# Patient Record
Sex: FEMALE | Race: AMERICAN INDIAN OR ALASKA NATIVE
[De-identification: names, ages, dates, MRNs, and addresses within clinical notes are randomized per-mention and may not be internally consistent; named-entity substitution may affect disease eponyms.]

---

## 2018-10-04 ENCOUNTER — HOSPITAL ENCOUNTER (EMERGENCY)
Dept: HOSPITAL 5 - ED | Age: 68
Discharge: HOME | End: 2018-10-04
Payer: MEDICARE

## 2018-10-04 VITALS — SYSTOLIC BLOOD PRESSURE: 185 MMHG | DIASTOLIC BLOOD PRESSURE: 97 MMHG

## 2018-10-04 DIAGNOSIS — I50.9: ICD-10-CM

## 2018-10-04 DIAGNOSIS — N18.6: ICD-10-CM

## 2018-10-04 DIAGNOSIS — I13.2: ICD-10-CM

## 2018-10-04 DIAGNOSIS — M19.90: ICD-10-CM

## 2018-10-04 DIAGNOSIS — Z91.013: ICD-10-CM

## 2018-10-04 DIAGNOSIS — E11.22: ICD-10-CM

## 2018-10-04 DIAGNOSIS — Z88.8: ICD-10-CM

## 2018-10-04 DIAGNOSIS — M10.072: Primary | ICD-10-CM

## 2018-10-04 DIAGNOSIS — Z79.4: ICD-10-CM

## 2018-10-04 PROCEDURE — 99282 EMERGENCY DEPT VISIT SF MDM: CPT

## 2018-10-04 NOTE — EMERGENCY DEPARTMENT REPORT
ED Extremity Problem HPI





- General


Chief complaint: Extremity Injury, Lower


Stated complaint: LEFT FOOT PAIN


Source: patient


Mode of arrival: Wheelchair


Limitations: No Limitations





- History of Present Illness


Initial comments: 





Patient is a 68-year-old  female who has a past history end-

stage renal disease and hypertension who is presenting with left foot pain.  

Patient states the pain is 8 out of 10 in severity as aching and throbbing.  

Patient was diagnosed with gout last week and was started on allopurinol but 

was told not to take it until the flareup improved which it is not.  Patient 

denies any fevers chills nausea vomiting diarrhea at this time.


Associated Symptoms: denies: shortness of breath, fever, myalgias





- Related Data


 Home Medications











 Medication  Instructions  Recorded  Confirmed  Last Taken


 


Carvedilol [Coreg] 6.25 mg PO BID 08/21/15 05/19/17 1 Day Ago





    ~05/18/17


 


NIFEdipine XL [Procardia Xl] 60 mg PO Q12HR 08/21/15 05/19/17 1 Day Ago





    ~05/18/17


 


Insulin Detemir [Levemir Flextouch] 15 unit SQ QHS PRN 12/09/16 05/19/17 1 Day 

Ago





    ~05/18/17


 


Insulin Lispro [HumaLOG VIAL] 0 units SQ AC PRN 12/09/16 05/19/17 1 Day Ago





    ~05/18/17


 


cloNIDine [Catapres] 0.2 mg PO QHS 12/09/16 05/19/17 1 Day Ago





    ~05/18/17








 Previous Rx's











 Medication  Instructions  Recorded  Last Taken  Type


 


Ferrous Sulfate [Feosol 325 MG tab] 325 mg PO BID #60 tablet 08/24/15 1 Day Ago 

Rx





   ~05/18/17 


 


Ondansetron [Zofran TAB] 4 mg PO Q8HR PRN #20 tablet 12/13/16 1 Day Ago Rx





   ~05/18/17 


 


oxyCODONE /ACETAMINOPHEN [Percocet 1 tab PO Q4HR PRN #20 tab 12/13/16 1 Day Ago 

Rx





5/325 mg]   ~05/18/17 


 


diphenhydrAMINE [Benadryl CAP] 25 mg PO Q8H PRN #10 capsule 05/19/17 Unknown Rx


 


Ibuprofen [Motrin] 600 mg PO Q8H PRN #20 tablet 10/04/18 Unknown Rx


 


oxyCODONE /ACETAMINOPHEN [Percocet 1 tab PO Q6HR PRN #15 tablet 10/04/18 

Unknown Rx





5/325]    











 Allergies











Allergy/AdvReac Type Severity Reaction Status Date / Time


 


digoxin Allergy  Hives Verified 02/17/16 11:21


 


lisinopril Allergy  Swelling Verified 02/17/16 11:21


 


nitroglycerin AdvReac  Rash, Verified 02/17/16 11:21





   HEADACHES  


 


shellfish derived AdvReac  Swelling Verified 02/17/16 11:21














ED Review of Systems


ROS: 


Stated complaint: LEFT FOOT PAIN


Other details as noted in HPI





Comment: All other systems reviewed and negative





ED Past Medical Hx





- Past Medical History


Previous Medical History?: Yes


Hx Hypertension: Yes (FOR 10+ YRS, DR. ABAD- PCP)


Hx Congestive Heart Failure: Yes (IN 2007)


Hx Diabetes: Yes (FOR 1 YR)


Hx Liver Disease: No


Hx Renal Disease: Yes (CKD STAGE 5, DR. GUTIERREZ- NEPHROLOGIST)


Hx Sickle Cell Disease: No


Hx Arthritis: Yes (Gout)


Hx Seizures: No


Hx Asthma: Yes (AS A CHILD)


Hx HIV: No


Additional medical history: Dilated cardiomyopathy / PERITONEAL DYALISIS





- Surgical History


Past Surgical History?: Yes


Hx Pacemaker: No


Hx Appendectomy: Yes (IN 1985)


Additional Surgical History: tonsil removed





- Social History


Smoking Status: Never Smoker


Substance Use Type: None





- Medications


Home Medications: 


 Home Medications











 Medication  Instructions  Recorded  Confirmed  Last Taken  Type


 


Carvedilol [Coreg] 6.25 mg PO BID 08/21/15 05/19/17 1 Day Ago History





    ~05/18/17 


 


NIFEdipine XL [Procardia Xl] 60 mg PO Q12HR 08/21/15 05/19/17 1 Day Ago History





    ~05/18/17 


 


Ferrous Sulfate [Feosol 325 MG tab] 325 mg PO BID #60 tablet 08/24/15 05/19/17 

1 Day Ago Rx





    ~05/18/17 


 


Insulin Detemir [Levemir Flextouch] 15 unit SQ QHS PRN 12/09/16 05/19/17 1 Day 

Ago History





    ~05/18/17 


 


Insulin Lispro [HumaLOG VIAL] 0 units SQ AC PRN 12/09/16 05/19/17 1 Day Ago 

History





    ~05/18/17 


 


cloNIDine [Catapres] 0.2 mg PO QHS 12/09/16 05/19/17 1 Day Ago History





    ~05/18/17 


 


Ondansetron [Zofran TAB] 4 mg PO Q8HR PRN #20 tablet 12/13/16 05/19/17 1 Day 

Ago Rx





    ~05/18/17 


 


oxyCODONE /ACETAMINOPHEN [Percocet 1 tab PO Q4HR PRN #20 tab 12/13/16 05/19/17 

1 Day Ago Rx





5/325 mg]    ~05/18/17 


 


diphenhydrAMINE [Benadryl CAP] 25 mg PO Q8H PRN #10 capsule 05/19/17  Unknown Rx


 


Ibuprofen [Motrin] 600 mg PO Q8H PRN #20 tablet 10/04/18  Unknown Rx


 


oxyCODONE /ACETAMINOPHEN [Percocet 1 tab PO Q6HR PRN #15 tablet 10/04/18  

Unknown Rx





5/325]     














ED Physical Exam





- General


Limitations: No Limitations


General appearance: alert, in no apparent distress





- Head


Head exam: Present: atraumatic, normocephalic





- Eye


Eye exam: Present: normal appearance





- ENT


ENT exam: Present: mucous membranes moist





- Neck


Neck exam: Present: normal inspection





- Respiratory


Respiratory exam: Present: normal lung sounds bilaterally.  Absent: respiratory 

distress, wheezes, rales, rhonchi





- Cardiovascular


Cardiovascular Exam: Present: regular rate, normal rhythm.  Absent: systolic 

murmur, diastolic murmur, rubs, gallop





- GI/Abdominal


GI/Abdominal exam: Present: soft, normal bowel sounds





- Extremities Exam


Extremities exam: Present: normal inspection, tenderness (patient has 

tenderness mild erythema and mild warmth to the dorsal left foot extending to 

the ankle)





- Back Exam


Back exam: Present: normal inspection





- Neurological Exam


Neurological exam: Present: alert, oriented X3





- Psychiatric


Psychiatric exam: Present: normal affect, normal mood





- Skin


Skin exam: Present: warm, dry, intact, normal color.  Absent: rash





ED Course





 Vital Signs











  10/04/18 10/04/18





  14:17 14:23


 


Temperature 98.4 F 


 


Pulse Rate 103 H 


 


Respiratory 18 





Rate  


 


Blood Pressure 190/92 


 


Blood Pressure  185/97





[Right]  


 


O2 Sat by Pulse 98 





Oximetry  














ED Medical Decision Making





- Medical Decision Making





She was started on colchicine and given meds for symptomatically relief and the 

patient will be discharged home.


Critical care attestation.: 


If time is entered above; I have spent that time in minutes in the direct care 

of this critically ill patient, excluding procedure time.








ED Disposition


Clinical Impression: 


 Gout attack





Disposition: DC-01 TO HOME OR SELFCARE


Is pt being admited?: No


Does the pt Need Aspirin: No


Condition: Stable


Instructions:  Acute Gouty Arthritis (ED)


Referrals: 


PRIMARY CARE,MD [Primary Care Provider] - 3-5 Days


Time of Disposition: 15:21

## 2020-10-25 ENCOUNTER — HOSPITAL ENCOUNTER (INPATIENT)
Dept: HOSPITAL 5 - ED | Age: 70
LOS: 2 days | Discharge: HOME | DRG: 291 | End: 2020-10-27
Attending: INTERNAL MEDICINE | Admitting: INTERNAL MEDICINE
Payer: MEDICARE

## 2020-10-25 DIAGNOSIS — Z79.4: ICD-10-CM

## 2020-10-25 DIAGNOSIS — N18.6: ICD-10-CM

## 2020-10-25 DIAGNOSIS — B20: ICD-10-CM

## 2020-10-25 DIAGNOSIS — D63.1: ICD-10-CM

## 2020-10-25 DIAGNOSIS — Z99.2: ICD-10-CM

## 2020-10-25 DIAGNOSIS — E11.22: ICD-10-CM

## 2020-10-25 DIAGNOSIS — I50.9: ICD-10-CM

## 2020-10-25 DIAGNOSIS — I13.2: Primary | ICD-10-CM

## 2020-10-25 DIAGNOSIS — Z82.49: ICD-10-CM

## 2020-10-25 DIAGNOSIS — I42.0: ICD-10-CM

## 2020-10-25 DIAGNOSIS — M19.90: ICD-10-CM

## 2020-10-25 DIAGNOSIS — Z91.013: ICD-10-CM

## 2020-10-25 DIAGNOSIS — Z88.8: ICD-10-CM

## 2020-10-25 DIAGNOSIS — E87.5: ICD-10-CM

## 2020-10-25 DIAGNOSIS — E21.3: ICD-10-CM

## 2020-10-25 DIAGNOSIS — M10.9: ICD-10-CM

## 2020-10-25 DIAGNOSIS — R44.2: ICD-10-CM

## 2020-10-25 LAB
ALBUMIN SERPL-MCNC: 3.8 G/DL (ref 3.9–5)
ALT SERPL-CCNC: 6 UNITS/L (ref 7–56)
APTT BLD: 26.4 SEC. (ref 24.2–36.6)
BACTERIA #/AREA URNS HPF: (no result) /HPF
BASOPHILS # (AUTO): 0.2 K/MM3 (ref 0–0.1)
BASOPHILS NFR BLD AUTO: 1.9 % (ref 0–1.8)
BILIRUB UR QL STRIP: (no result)
BLOOD UR QL VISUAL: (no result)
BUN SERPL-MCNC: 64 MG/DL (ref 7–17)
BUN/CREAT SERPL: 5 %
CALCIUM SERPL-MCNC: 10.3 MG/DL (ref 8.4–10.2)
EOSINOPHIL # BLD AUTO: 0.8 K/MM3 (ref 0–0.4)
EOSINOPHIL NFR BLD AUTO: 9.2 % (ref 0–4.3)
HCT VFR BLD CALC: 19.5 % (ref 30.3–42.9)
HDLC SERPL-MCNC: 33 MG/DL (ref 40–59)
HEMOLYSIS INDEX: 0
HGB BLD-MCNC: 6.2 GM/DL (ref 10.1–14.3)
INR PPP: 0.76 (ref 0.87–1.13)
LYMPHOCYTES # BLD AUTO: 1.1 K/MM3 (ref 1.2–5.4)
LYMPHOCYTES NFR BLD AUTO: 11.9 % (ref 13.4–35)
MCHC RBC AUTO-ENTMCNC: 32 % (ref 30–34)
MCV RBC AUTO: 96 FL (ref 79–97)
MONOCYTES # (AUTO): 0.8 K/MM3 (ref 0–0.8)
MONOCYTES % (AUTO): 8.8 % (ref 0–7.3)
MUCOUS THREADS #/AREA URNS HPF: (no result) /HPF
PH UR STRIP: 7 [PH] (ref 5–7)
PLATELET # BLD: 322 K/MM3 (ref 140–440)
RBC # BLD AUTO: 2.03 M/MM3 (ref 3.65–5.03)
RBC #/AREA URNS HPF: 1 /HPF (ref 0–6)
UROBILINOGEN UR-MCNC: < 2 MG/DL (ref ?–2)
WBC #/AREA URNS HPF: 2 /HPF (ref 0–6)

## 2020-10-25 PROCEDURE — 86920 COMPATIBILITY TEST SPIN: CPT

## 2020-10-25 PROCEDURE — 90471 IMMUNIZATION ADMIN: CPT

## 2020-10-25 PROCEDURE — 85730 THROMBOPLASTIN TIME PARTIAL: CPT

## 2020-10-25 PROCEDURE — 81001 URINALYSIS AUTO W/SCOPE: CPT

## 2020-10-25 PROCEDURE — 85610 PROTHROMBIN TIME: CPT

## 2020-10-25 PROCEDURE — 86901 BLOOD TYPING SEROLOGIC RH(D): CPT

## 2020-10-25 PROCEDURE — 86850 RBC ANTIBODY SCREEN: CPT

## 2020-10-25 PROCEDURE — 80061 LIPID PANEL: CPT

## 2020-10-25 PROCEDURE — 93005 ELECTROCARDIOGRAM TRACING: CPT

## 2020-10-25 PROCEDURE — 84484 ASSAY OF TROPONIN QUANT: CPT

## 2020-10-25 PROCEDURE — 86900 BLOOD TYPING SEROLOGIC ABO: CPT

## 2020-10-25 PROCEDURE — 82553 CREATINE MB FRACTION: CPT

## 2020-10-25 PROCEDURE — 84100 ASSAY OF PHOSPHORUS: CPT

## 2020-10-25 PROCEDURE — 85025 COMPLETE CBC W/AUTO DIFF WBC: CPT

## 2020-10-25 PROCEDURE — 83735 ASSAY OF MAGNESIUM: CPT

## 2020-10-25 PROCEDURE — 71046 X-RAY EXAM CHEST 2 VIEWS: CPT

## 2020-10-25 PROCEDURE — P9016 RBC LEUKOCYTES REDUCED: HCPCS

## 2020-10-25 PROCEDURE — 82550 ASSAY OF CK (CPK): CPT

## 2020-10-25 PROCEDURE — 36415 COLL VENOUS BLD VENIPUNCTURE: CPT

## 2020-10-25 PROCEDURE — 80048 BASIC METABOLIC PNL TOTAL CA: CPT

## 2020-10-25 PROCEDURE — 83880 ASSAY OF NATRIURETIC PEPTIDE: CPT

## 2020-10-25 PROCEDURE — 82962 GLUCOSE BLOOD TEST: CPT

## 2020-10-25 PROCEDURE — 30233N1 TRANSFUSION OF NONAUTOLOGOUS RED BLOOD CELLS INTO PERIPHERAL VEIN, PERCUTANEOUS APPROACH: ICD-10-PCS | Performed by: INTERNAL MEDICINE

## 2020-10-25 PROCEDURE — 80053 COMPREHEN METABOLIC PANEL: CPT

## 2020-10-25 RX ADMIN — INSULIN LISPRO SCH: 100 INJECTION, SOLUTION INTRAVENOUS; SUBCUTANEOUS at 14:37

## 2020-10-25 RX ADMIN — Medication SCH ML: at 21:58

## 2020-10-25 RX ADMIN — NIFEDIPINE SCH MG: 60 TABLET, EXTENDED RELEASE ORAL at 21:58

## 2020-10-25 RX ADMIN — INSULIN LISPRO SCH: 100 INJECTION, SOLUTION INTRAVENOUS; SUBCUTANEOUS at 23:23

## 2020-10-25 NOTE — EMERGENCY DEPARTMENT REPORT
ED Shortness of Breath HPI





- General


Chief Complaint: Dyspnea/Respdistress


Stated Complaint: CESAR/PAIN/SKIN PROBLEMS


Time Seen by Provider: 10/25/20 11:00


Source: patient


Mode of arrival: Ambulatory


Limitations: No Limitations





- History of Present Illness


Initial Comments: 


This is a very pleasant 70-year-old female on PD.  She states that she did not 

finish a complete treatment yesterday because she was hurting all over.  She 

does not complain of abdominal pain now.  She denies fever or chills.  She 

states she has been short of breath for some time.





Patient does appear to have a number of somatic delusions to include "blood 

coming out of my fingertips".  "Foam coming out of my hands" and "pus coming out

everywhere I have hair".  Patient is however oriented, awake and alert.  She is 

not in respiratory distress.  She is not otherwise agitated or apparently 

delusional.  She tells me she lives at home with her .  She denies being 

on any medications which might be associated with any of the above symptoms.  

She states that she has had the symptoms for some time.  She states she is in 

regular follow-up with [facility] nephrology.  She tells me that she has 

not been in the hospital since her last admission here when she had pneum

operitoneum related to air getting in via her PD catheter.  Patient states she 

has never been a smoker nor does her .





Per last nephrology note summary:





Mrs. Bahena is a 65yo with ESRD on CCPD who presented to the ED with 

shoulder/neck pain followed by facial/neck swelling.  She denies SOB, fever, 

sore throat, difficulty swallowing.  She denies change in food/eating habits.  S

ymptoms continued to worsen which prompted her visit to the ED.  Pain and 

swelling have both resolved according to patient.





Past History


Past Medical History: COPD, hypertension, renal failure, other (Dilated 

Cardiomyopathy).  denies: diabetes


Past Surgical History: appendectomy, Other (Peritoneal Dialysis Catherer)


Social history: denies: smoking, alcohol abuse


Family history: cancer (Father  of Lung CA), hypertension (Mother has HTN)





Medications and Allergies


Allergies





Allergy/AdvReac   Type   Severity   Reaction   Status   Date / Time


digoxin   Allergy      Hives   Verified   16 11:21


lisinopril   Allergy      Swelling   Verified   16 11:21


nitroglycerin   AdvReac      Rash,   Verified   16 11:21


         HEADACHES      


shellfish derived   AdvReac      Swelling   Verified   16 11:21








Home Medications





 Medication    Instructions    Recorded    Confirmed    Last Taken    Type


Carvedilol [Coreg]   6.25 mg PO BID   08/21/15   05/19/17   1 Day Ago   History


NIFEdipine XL [Procardia Xl]   60 mg PO Q12HR   08/21/15   05/19/17   1 Day Ago

   History


Ferrous Sulfate [Feosol 325 MG tab]   325 mg PO BID #60 tablet   08/24/15   

05/19/17   1 Day Ago   Rx


Insulin Detemir [Levemir Flextouch]   15 unit SQ QHS PRN   16

   1 Day Ago   History


Insulin Lispro [HumaLOG VIAL]   0 units SQ AC PRN   16   1 Day 

Ago   History


cloNIDine [Catapres]   0.2 mg PO QHS   16   1 Day Ago   History


Ondansetron [Zofran TAB]   4 mg PO Q8HR PRN #20 tablet   16   1

Day Ago   Rx


oxyCODONE /ACETAMINOPHEN [Percocet   1 tab PO Q4HR PRN #20 tab   16   1 Day Ago   Rx


5/325 mg]               


diphenhydrAMINE [Benadryl CAP]   25 mg PO Q8H PRN #10 capsule   17      

Unknown   Rx








Active Meds: 


Active Medications





Carvedilol (Coreg)  6.25 mg PO BID [facility]


   Last Admin: 17 11:21 Dose:  6.25 mg


Clonidine HCl (Catapres)  0.2 mg PO QHS [facility]


Diphenhydramine HCl (Benadryl)  25 mg PO Q8H PRN


   PRN Reason: Itching


   Last Admin: 17 11:22 Dose:  25 mg


Ferrous Sulfate (Feosol)  325 mg PO BID [facility]


   Last Admin: 17 11:21 Dose:  325 mg


Heparin Sodium (Porcine) (Heparin)  5,000 unit SUB-Q Q8HR [facility]


   Last Admin: 17 05:45 Dose:  5,000 unit


Insulin Aspart (Novolog)  5 units SUB-Q AC [facility]


   Last Admin: 17 11:29 Dose:  Not Given


Insulin Detemir (Levemir)  15 units SUB-Q QHS [facility]


Insulin Human Regular (Novolin R)  0 units SUB-Q ACHS ANTONY


   PRN Reason: Protocol


Morphine Sulfate (Morphine)  1 mg IV Q4H PRN


   PRN Reason: Pain , Severe (7-10)


   Last Admin: 17 11:24 Dose:  1 mg


Nifedipine (Procardia Xl)  60 mg PO Q12HR ANTONY


Ondansetron HCl (Zofran)  4 mg PO Q8HR PRN


   PRN Reason: Nausea


Oxycodone/Acetaminophen (Percocet 5/325)  1 tab PO Q4HR PRN


   PRN Reason: Pain


   Last Admin: 17 12:42 Dose:  1 tab


Peritoneal Dialysis Solution (Dianeal Low Calcium W/2.5% Dextrose)  2,000 ml IP 

Q4H [facility]


   Last Admin: 17 12:26 Dose:  2,000 ml





MD Complaint: shortness of breath


-: Gradual, days(s)


Known History Of: COPD, other


Associated Symptoms: denies other symptoms


Treatments Prior to Arrival: none





- Related Data


Home Oxygen Therapy: No


                                Home Medications











 Medication  Instructions  Recorded  Confirmed  Last Taken


 


NIFEdipine XL [Procardia Xl] 60 mg PO Q12HR 08/21/15 05/19/17 10/24/20


 


carvediloL [Coreg] 6.25 mg PO BID 08/21/15 05/19/17 10/24/20


 


Insulin Detemir (Nf) [Levemir 15 unit SQ QHS PRN 12/09/16 05/19/17 10/25/19





Flextouch (Nf)]    


 


Insulin Lispro [HumaLOG VIAL] 0 units SQ AC PRN 12/09/16 05/19/17 10/25/19


 


cloNIDine [Catapres] 0.2 mg PO QHS 12/09/16 05/19/17 10/24/20


 


Cholecalciferol (Vitamin D3) 3,000 unit PO DAILY 10/25/20 10/25/20 10/24/20





[Vitamin D3 3,000 unit]    


 


Cinacalcet HCl [Sensipar] 60 mg PO DAILY 10/25/20 10/25/20 Unknown


 


Gentamicin 0.1% Top Oint(Nf) 1 applic TP TID 10/25/20 10/25/20 Unknown





[Gentamicin 0.1% Top Oint (Nf)]    








                                  Previous Rx's











 Medication  Instructions  Recorded  Last Taken  Type


 


Ibuprofen [Motrin] 600 mg PO Q8H PRN #20 tablet 10/04/18 Unknown Rx











                                    Allergies











Allergy/AdvReac Type Severity Reaction Status Date / Time


 


digoxin Allergy  Hives Verified 16 11:21


 


lisinopril Allergy  Swelling Verified 16 11:21


 


nitroglycerin AdvReac  Rash, Verified 16 11:21





   HEADACHES  


 


shellfish derived AdvReac  Swelling Verified 16 11:21














ED Review of Systems


ROS: 


Stated complaint: CESAR/PAIN/SKIN PROBLEMS


Other details as noted in HPI





Constitutional: denies: chills, fever


Eyes: denies: eye pain, eye discharge, vision change


ENT: denies: ear pain, throat pain


Respiratory: shortness of breath, SOB with exertion.  denies: cough (Only 

occasional with no acute change), wheezing


Cardiovascular: denies: chest pain, palpitations


Endocrine: no symptoms reported


Gastrointestinal: denies: abdominal pain, nausea, diarrhea


Genitourinary: denies: urgency, dysuria, discharge


Musculoskeletal: denies: back pain, joint swelling, arthralgia


Skin: as per HPI (Quite a variety of symptoms likely somatic in nature.)


Neurological: denies: headache, weakness, paresthesias


Psychiatric: denies: anxiety, depression


Hematological/Lymphatic: denies: easy bleeding, easy bruising





ED Past Medical Hx





- Past Medical History


Previous Medical History?: Yes


Hx Hypertension: Yes (FOR 10+ YRS, DR. ABAD- PCP)


Hx Congestive Heart Failure: Yes ([place] )


Hx Diabetes: Yes


Hx Liver Disease: No


Hx Renal Disease: Yes (CKD STAGE 5, DR. GUTIERREZ- NEPHROLOGIST)


Hx Sickle Cell Disease: No


Hx Arthritis: Yes (Gout)


Hx Seizures: No


Hx Asthma: Yes (AS A CHILD)


Hx HIV: No


Additional medical history: Dilated cardiomyopathy / PERITONEAL DYALISIS





- Surgical History


Past Surgical History?: Yes


Hx Pacemaker: No


Hx Appendectomy: Yes ([place] )


Additional Surgical History: tonsil removed





- Social History


Smoking Status: Never Smoker


Substance Use Type: None





- Medications


Home Medications: 


                                Home Medications











 Medication  Instructions  Recorded  Confirmed  Last Taken  Type


 


NIFEdipine XL [Procardia Xl] 60 mg PO Q12HR 08/21/15 05/19/17 10/24/20 History


 


carvediloL [Coreg] 6.25 mg PO BID 08/21/15 05/19/17 10/24/20 History


 


Insulin Detemir (Nf) [Levemir 15 unit SQ QHS PRN 12/09/16 05/19/17 10/25/19 

History





Flextouch (Nf)]     


 


Insulin Lispro [HumaLOG VIAL] 0 units SQ AC PRN 12/09/16 05/19/17 10/25/19 

History


 


cloNIDine [Catapres] 0.2 mg PO QHS 12/09/16 05/19/17 10/24/20 History


 


Ibuprofen [Motrin] 600 mg PO Q8H PRN #20 tablet 10/04/18  Unknown Rx


 


Cholecalciferol (Vitamin D3) 3,000 unit PO DAILY 10/25/20 10/25/20 10/24/20 

History





[Vitamin D3 3,000 unit]     


 


Cinacalcet HCl [Sensipar] 60 mg PO DAILY 10/25/20 10/25/20 Unknown History


 


Gentamicin 0.1% Top Oint(Nf) 1 applic TP TID 10/25/20 10/25/20 Unknown History





[Gentamicin 0.1% Top Oint (Nf)]     














ED Physical Exam





- General


Limitations: Physical Limitation


General appearance: alert, in no apparent distress, other (Pale)





- Head


Head exam: Present: atraumatic, normocephalic





- Eye


Eye exam: Present: normal appearance.  Absent: scleral icterus





- ENT


ENT exam: Present: mucous membranes moist





- Neck


Neck exam: Present: normal inspection.  Absent: tenderness, meningismus





- Respiratory


Respiratory exam: Present: normal lung sounds bilaterally.  Absent: respiratory 

distress





- Cardiovascular


Cardiovascular Exam: Present: regular rate, normal rhythm.  Absent: systolic 

murmur, diastolic murmur, rubs, gallop





- GI/Abdominal


GI/Abdominal exam: Present: soft, normal bowel sounds, other (PD catheter 

dressed in situ.  No obvious abnormal findings).  Absent: distended, tenderness,

guarding, rebound





- Extremities Exam


Extremities exam: Present: pedal edema (Mild pedal edema)





- Back Exam


Back exam: Present: normal inspection





- Neurological Exam


Neurological exam: Present: alert, oriented X3, CN II-XII intact.  Absent: motor

sensory deficit





- Psychiatric


Psychiatric exam: Present: normal affect, normal mood





- Skin


Skin exam: Present: warm, dry, intact, other (Patient skin is pale.  She does 

have a number of pigmented areas to include the bottom of her feet.  They appear

to be quite chronic in nature.).  Absent: rash





ED Course


                                   Vital Signs











  10/25/20 10/25/20 10/25/20





  10:03 11:05 11:15


 


Temperature 97.7 F  


 


Pulse Rate 93 H 87 96 H


 


Respiratory  13 16





Rate   


 


Blood Pressure 161/74  143/66


 


O2 Sat by Pulse 16 L 96 99





Oximetry   














  10/25/20 10/25/20





  11:31 12:01


 


Temperature  


 


Pulse Rate 85 


 


Respiratory 20 20





Rate  


 


Blood Pressure 147/71 


 


O2 Sat by Pulse 98 98





Oximetry  














- Reevaluation(s)


Reevaluation #1: 


Patient will require transfusion and admission to the hospital.  Her chemistries

are yet pending.


10/25/20 11:20





Reevaluation #2: 


Discussed with hospitalist.  Will be admitted by Dr. Floyd.  Consult to 

nephrology.  No acute renal issue found this time that requires emergency 

consultation.  Unit of blood ordered.  Hospitalist for further evaluation.


10/25/20 11:28








ED Medical Decision Making





- Lab Data


Result diagrams: 


                                 10/25/20 10:30





                                 10/25/20 10:30








                         Laboratory Results - last 24 hr











  10/25/20 10/25/20 10/25/20





  10:21 10:30 10:30


 


WBC   9.1 


 


RBC   2.03 L 


 


Hgb   6.2 L 


 


Hct   19.5 L* 


 


MCV   96 


 


MCH   31 


 


MCHC   32 


 


RDW   19.9 H 


 


Plt Count   322 


 


Lymph % (Auto)   11.9 L 


 


Mono % (Auto)   8.8 H 


 


Eos % (Auto)   9.2 H 


 


Baso % (Auto)   1.9 H 


 


Lymph # (Auto)   1.1 L 


 


Mono # (Auto)   0.8 


 


Eos # (Auto)   0.8 H 


 


Baso # (Auto)   0.2 H 


 


Seg Neutrophils %   68.2 


 


Seg Neutrophils #   6.2 


 


Sodium    142


 


Potassium    5.0


 


Chloride    97.7 L


 


Carbon Dioxide    25


 


Anion Gap    24


 


BUN    64 H


 


Creatinine    13.8 H


 


Estimated GFR    3


 


BUN/Creatinine Ratio    5


 


Glucose    98


 


POC Glucose  99  


 


Calcium    10.3 H


 


Phosphorus   


 


Magnesium   


 


Total Bilirubin    0.30


 


AST    9


 


ALT    6 L


 


Alkaline Phosphatase    138 H


 


Total Protein    7.6


 


Albumin    3.8 L


 


Albumin/Globulin Ratio    1.0


 


Urine Color   


 


Urine Turbidity   


 


Urine pH   


 


Ur Specific Gravity   


 


Urine Protein   


 


Urine Glucose (UA)   


 


Urine Ketones   


 


Urine Blood   


 


Urine Nitrite   


 


Urine Bilirubin   


 


Urine Urobilinogen   


 


Ur Leukocyte Esterase   


 


Urine WBC (Auto)   


 


Urine RBC (Auto)   


 


U Epithel Cells (Auto)   


 


Urine Bacteria (Auto)   


 


Urine Mucus   














  10/25/20 10/25/20





  10:30 Unknown


 


WBC  


 


RBC  


 


Hgb  


 


Hct  


 


MCV  


 


MCH  


 


MCHC  


 


RDW  


 


Plt Count  


 


Lymph % (Auto)  


 


Mono % (Auto)  


 


Eos % (Auto)  


 


Baso % (Auto)  


 


Lymph # (Auto)  


 


Mono # (Auto)  


 


Eos # (Auto)  


 


Baso # (Auto)  


 


Seg Neutrophils %  


 


Seg Neutrophils #  


 


Sodium  


 


Potassium  


 


Chloride  


 


Carbon Dioxide  


 


Anion Gap  


 


BUN  


 


Creatinine  


 


Estimated GFR  


 


BUN/Creatinine Ratio  


 


Glucose  


 


POC Glucose  


 


Calcium  


 


Phosphorus  7.60 H 


 


Magnesium  2.00 


 


Total Bilirubin  


 


AST  


 


ALT  


 


Alkaline Phosphatase  


 


Total Protein  


 


Albumin  


 


Albumin/Globulin Ratio  


 


Urine Color   Yellow


 


Urine Turbidity   Clear


 


Urine pH   7.0


 


Ur Specific Gravity   1.012


 


Urine Protein   100 mg/dl


 


Urine Glucose (UA)   Neg


 


Urine Ketones   Neg


 


Urine Blood   Neg


 


Urine Nitrite   Neg


 


Urine Bilirubin   Neg


 


Urine Urobilinogen   < 2.0


 


Ur Leukocyte Esterase   Tr


 


Urine WBC (Auto)   2.0


 


Urine RBC (Auto)   1.0


 


U Epithel Cells (Auto)   5.0


 


Urine Bacteria (Auto)   1+


 


Urine Mucus   Few














- EKG Data


-: EKG Interpreted by Me


EKG shows normal: sinus rhythm, axis, intervals, QRS complexes, ST-T waves


Rate: normal





- EKG Data


Interpretation: no acute changes, nonspecific ST-T wave rustam, other (P pulmonale 

noted)





- Radiology Data


Radiology results: image reviewed (Consistent with chronic lung, no acute 

finding)


Critical care attestation.: 


If time is entered above; I have spent that time in minutes in the direct care 

of this critically ill patient, excluding procedure time.








ED Disposition


Clinical Impression: 


 Symptomatic anemia, End-stage renal disease on peritoneal dialysis, 

Hypophosphatemia, Hallucinosis, Hyperphosphatemia, Dyspnea





Disposition:  OP ADMIT IP TO THIS HOSP


Is pt being admited?: Yes


Does the pt Need Aspirin: Yes


Condition: Stable


Time of Disposition: 11:29

## 2020-10-25 NOTE — HISTORY AND PHYSICAL REPORT
History of Present Illness


Date of examination: 10/25/20


Date of admission: 


10/25/20 11:56





Chief complaint: 





sob fatigue  anemia


History of present illness: 





Patient is a 70-year-old female with a history of end-stage renal disease, COPD 

presents with shortness of breath dyspnea on exertion orthopnea which she 

described as trouble breathing when she lies down.  Patient denies any chest 

pain denies nausea vomiting denies any sick contacts denies any recent travel.  

Patient states she has been doing fairly well until this time medically patient 

however will quickly return the history to some what appears to be visual 

hallucinations which she states blood and pus coming from her fingertips in her 

feet.  There are obviously no lesions there that are consistent with this.  

Patient showed me on her phone picture of stretch marks and then says this is 

pus running out of her skin when clearly that stretch marks.  Patient states she

has itching on the bottom her feet.  I did call her  and he states that 

this was unusual behavior for the patient however he did not have much to add as

far as her history.  Was very difficult to get any information from him even if 

he knew what was going on.  Apparently may be a ex-.  Otherwise patient 

is comfortable no new concerns.  She is not any pain denies pain no fever 

chills.  The rest of her history and conversation is appropriate.  Upon work-up 

patient found to be anemic with hematocrit of 15 and hemoglobin of 6.  Patient 

was admitted for anemia transfusion end-stage renal disease she sees Dr. Jovanni conti for nephrology.





Past History


Past Medical History: anemia, COPD, dialysis, ESRD, hypertension.  denies: acute

MI, atrial fib, arrhythmia, arthritis, cancer, diabetes, DVT, GERD, heart 

failure, hepatitis, HIV/AIDS, hyperthyroidism, hyperlipidemia, liver disease, 

pulmonary embolism, stroke, other


Past Surgical History: Other (PD)


Social history: , lives with family, full code.  denies: smoking, alcohol

abuse, prescription drug abuse


Family history: hypertension





Medications and Allergies


                                    Allergies











Allergy/AdvReac Type Severity Reaction Status Date / Time


 


digoxin Allergy  Hives Verified 02/17/16 11:21


 


lisinopril Allergy  Swelling Verified 02/17/16 11:21


 


nitroglycerin AdvReac  Rash, Verified 02/17/16 11:21





   HEADACHES  


 


shellfish derived AdvReac  Swelling Verified 02/17/16 11:21











                                Home Medications











 Medication  Instructions  Recorded  Confirmed  Last Taken  Type


 


NIFEdipine XL [Procardia Xl] 60 mg PO Q12HR 08/21/15 10/25/20 10/24/20 History


 


carvediloL [Coreg] 6.25 mg PO BID 08/21/15 10/25/20 10/24/20 History


 


Insulin Detemir (Nf) [Levemir 15 unit SQ QHS PRN 12/09/16 10/25/20 10/25/19 

History





Flextouch (Nf)]     


 


Insulin Lispro [HumaLOG VIAL] 0 units SQ AC PRN 12/09/16 10/25/20 10/25/19 

History


 


cloNIDine [Catapres] 0.2 mg PO QHS 12/09/16 10/25/20 10/24/20 History


 


Ibuprofen [Motrin] 600 mg PO Q8H PRN #20 tablet 10/04/18 10/25/20 Unknown Rx


 


Cholecalciferol (Vitamin D3) 3,000 unit PO DAILY 10/25/20 10/25/20 10/24/20 

History





[Vitamin D3 3,000 unit]     


 


Cinacalcet HCl [Sensipar] 60 mg PO DAILY 10/25/20 10/25/20 Unknown History


 


Gentamicin 0.1% Top Oint(Nf) 1 applic TP TID 10/25/20 10/25/20 Unknown History





[Gentamicin 0.1% Top Oint (Nf)]     











Active Meds: 


Active Medications





Acetaminophen (Tylenol)  650 mg PO Q4H PRN


   PRN Reason: Pain MILD(1-3)/Fever >100.5/HA


Acetaminophen (Tylenol)  650 mg PO Q4H PRN


   PRN Reason: Pain MILD(1-3)/Fever >100.5/HA


Carvedilol (Coreg)  6.25 mg PO BID ANTONY


Clonidine HCl (Catapres)  0.2 mg PO QHS ANTONY


Dextrose (D50w (25gm) Syringe)  50 ml IV Q30MIN PRN; Protocol


   PRN Reason: Hypoglycemia


Insulin Human Lispro (Humalog)  0 unit SUB-Q Q6H ANTONY; Protocol


Miscellaneous Medication (Cinacalcet Hcl [Sensipar])  60 mg PO DAILY ANTONY


Miscellaneous Medication (Cholecalciferol (Vitamin D3) [Vitamin D3 3,000 Unit]) 

3,000 unit PO DAILY ANTONY


Miscellaneous Medication (Insulin Detemir (Nf))  15 unit SQ QHS PRN


   PRN Reason: HIGH GLUCOSE


Nifedipine (Procardia Xl)  60 mg PO Q12HR ANTONY


Ondansetron HCl (Zofran)  4 mg IV Q8H PRN


   PRN Reason: Nausea And Vomiting


Ondansetron HCl (Zofran)  4 mg IV Q8H PRN


   PRN Reason: Nausea And Vomiting


Oxycodone/Acetaminophen (Percocet 5/325)  1 tab PO Q6H PRN


   PRN Reason: Pain, Moderate (4-6)


Sodium Chloride (Sodium Chloride Flush Syringe 10 Ml)  10 ml IV BID ANTONY


Sodium Chloride (Sodium Chloride Flush Syringe 10 Ml)  10 ml IV PRN PRN


   PRN Reason: LINE FLUSH


Sodium Chloride (Sodium Chloride Flush Syringe 10 Ml)  10 ml IV BID ANTONY


Sodium Chloride (Sodium Chloride Flush Syringe 10 Ml)  10 ml IV PRN PRN


   PRN Reason: LINE FLUSH











Review of Systems


Constitutional: weakness, malaise, no weight loss, no weight gain, no fever, no 

chills, no sweats, no night sweats, no anorexia, no lethargy, no chronic 

headaches, no poor appetite, no daytime sleepiness, no chronic pain


Ears, nose, mouth and throat: no deferred, no ear pain, no ear discharge, no 

tinnitis, no nasal congestion, no nasal discharge, no dental pain, no dysphagia,

no post-nasal drip, no headache, no vertigo, no neck lump


Cardiovascular: shortness of breath, dyspnea on exertion, paroxysmal nocturnal 

dyspnea, no chest pain, no orthopnea, no palpitations, no rapid/irregular heart 

beat, no edema, no syncope, no lightheadedness, no claudication, no phlebitis, 

no high blood pressure, no leg edema, no decreased exercise tolerance


Respiratory: shortness of breath, dyspnea on exertion, no cough, no cough with 

sputum, no excessive sputum, no hemoptysis, no congestion, no wheezing, no 

pleurisy, no pain, no pain on inspiration, no respiratory infections


Gastrointestinal: nausea, no abdominal pain, no vomiting, no diarrhea, no 

constipation, no change in bowel habits, no hematemesis, no coffee ground 

emesis, no melena, no hematochezia, no heartburn, no lactose intolerance


Musculoskeletal: no neck stiffness, no neck pain, no shooting arm pain, no arm 

numbness/tingling, no low back pain, no shooting leg pain, no leg 

numbness/tingling, no redness of joints, no hot joints, no morning stiffness, no

muscle cramps, no limitation of motion, no fractures


Neurological: no transient paralysis, no paralysis, no numbness, no syncope, no 

tremors, no vertigo, no migraines, no convulsions, no change in mentation, no 

memory loss, no motor disturbance, no double vision, no hearing difficulties, no

paralysis


Psychiatric: anxiety, memory loss, sleep disturbances, no change in sleep 

habits, no insomnia, no hypersomnia, no change in libido, no suicidal ideation, 

no paranoia, no depression, no hopelessness, no sadness/tearfullness


Endocrine: no cold intolerance, no excessive thirst, no nocturia, no weight 

change, no proptosis, no low blood sugars, no recent glucocorticoid use, no 

other


Hematologic/Lymphatic: no thrombophilia





Exam





- Constitutional


Vitals: 


                                        











Temp Pulse Resp BP Pulse Ox


 


 97.7 F   85   20   147/71   98 


 


 10/25/20 10:03  10/25/20 11:31  10/25/20 12:01  10/25/20 11:31  10/25/20 12:01











General appearance: Present: no acute distress, well-nourished





- EENT


Eyes: Present: PERRL


ENT: hearing intact, clear oral mucosa





- Neck


Neck: Present: supple, normal ROM





- Respiratory


Respiratory effort: normal


Respiratory: bilateral: CTA





- Cardiovascular


Heart Sounds: Present: S1 & S2.  Absent: rub, click





- Extremities


Extremities: pulses symmetrical, No edema


Peripheral Pulses: within normal limits





- Abdominal


General gastrointestinal: Present: soft, non-tender, non-distended, normal bowel

sounds


Female genitourinary: Present: normal





- Integumentary


Integumentary: Present: clear, warm, dry





- Musculoskeletal


Musculoskeletal: gait normal, strength equal bilaterally





- Psychiatric


Psychiatric: appropriate mood/affect, intact judgment & insight





- Neurologic


Neurologic: CNII-XII intact, moves all extremities





HEART Score





- HEART Score


Troponin: 


                                        











Troponin T  0.050 ng/mL (0.00-0.029)  H  10/25/20  11:20    














Results





- Labs


CBC & Chem 7: 


                                 10/25/20 10:30





                                 10/25/20 10:30


Labs: 


                             Laboratory Last Values











WBC  9.1 K/mm3 (4.5-11.0)   10/25/20  10:30    


 


RBC  2.03 M/mm3 (3.65-5.03)  L  10/25/20  10:30    


 


Hgb  6.2 gm/dl (10.1-14.3)  L  10/25/20  10:30    


 


Hct  19.5 % (30.3-42.9)  L*  10/25/20  10:30    


 


MCV  96 fl (79-97)   10/25/20  10:30    


 


MCH  31 pg (28-32)   10/25/20  10:30    


 


MCHC  32 % (30-34)   10/25/20  10:30    


 


RDW  19.9 % (13.2-15.2)  H  10/25/20  10:30    


 


Plt Count  322 K/mm3 (140-440)   10/25/20  10:30    


 


Lymph % (Auto)  11.9 % (13.4-35.0)  L  10/25/20  10:30    


 


Mono % (Auto)  8.8 % (0.0-7.3)  H  10/25/20  10:30    


 


Eos % (Auto)  9.2 % (0.0-4.3)  H  10/25/20  10:30    


 


Baso % (Auto)  1.9 % (0.0-1.8)  H  10/25/20  10:30    


 


Lymph # (Auto)  1.1 K/mm3 (1.2-5.4)  L  10/25/20  10:30    


 


Mono # (Auto)  0.8 K/mm3 (0.0-0.8)   10/25/20  10:30    


 


Eos # (Auto)  0.8 K/mm3 (0.0-0.4)  H  10/25/20  10:30    


 


Baso # (Auto)  0.2 K/mm3 (0.0-0.1)  H  10/25/20  10:30    


 


Seg Neutrophils %  68.2 % (40.0-70.0)   10/25/20  10:30    


 


Seg Neutrophils #  6.2 K/mm3 (1.8-7.7)   10/25/20  10:30    


 


PT  10.7 Sec. (12.2-14.9)  L  10/25/20  11:20    


 


INR  0.76  (0.87-1.13)  L  10/25/20  11:20    


 


APTT  26.4 Sec. (24.2-36.6)   10/25/20  11:20    


 


Sodium  142 mmol/L (137-145)   10/25/20  10:30    


 


Potassium  5.0 mmol/L (3.6-5.0)   10/25/20  10:30    


 


Chloride  97.7 mmol/L ()  L  10/25/20  10:30    


 


Carbon Dioxide  25 mmol/L (22-30)   10/25/20  10:30    


 


Anion Gap  24 mmol/L  10/25/20  10:30    


 


BUN  64 mg/dL (7-17)  H  10/25/20  10:30    


 


Creatinine  13.8 mg/dL (0.6-1.2)  H  10/25/20  10:30    


 


Estimated GFR  3 ml/min  10/25/20  10:30    


 


BUN/Creatinine Ratio  5 %  10/25/20  10:30    


 


Glucose  98 mg/dL ()   10/25/20  10:30    


 


POC Glucose  99 mg/dL ()   10/25/20  10:21    


 


Calcium  10.3 mg/dL (8.4-10.2)  H  10/25/20  10:30    


 


Phosphorus  7.60 mg/dL (2.5-4.5)  H  10/25/20  10:30    


 


Magnesium  2.00 mg/dL (1.7-2.3)   10/25/20  10:30    


 


Total Bilirubin  0.30 mg/dL (0.1-1.2)   10/25/20  10:30    


 


AST  9 units/L (5-40)   10/25/20  10:30    


 


ALT  6 units/L (7-56)  L  10/25/20  10:30    


 


Alkaline Phosphatase  138 units/L ()  H  10/25/20  10:30    


 


Total Creatine Kinase  65 units/L ()   10/25/20  11:20    


 


CK-MB (CK-2)  2.1 ng/mL (0.0-4.0)   10/25/20  11:20    


 


CK-MB (CK-2) Rel Index  3.2  (0-4)   10/25/20  11:20    


 


Troponin T  0.050 ng/mL (0.00-0.029)  H  10/25/20  11:20    


 


NT-Pro-B Natriuret Pep  8264 pg/mL (0-900)  H  10/25/20  11:20    


 


Total Protein  7.6 g/dL (6.3-8.2)   10/25/20  10:30    


 


Albumin  3.8 g/dL (3.9-5)  L  10/25/20  10:30    


 


Albumin/Globulin Ratio  1.0 %  10/25/20  10:30    


 


Triglycerides  134 mg/dL (2-149)   10/25/20  11:20    


 


Cholesterol  119 mg/dL ()   10/25/20  11:20    


 


LDL Cholesterol Direct  61 mg/dL ()   10/25/20  11:20    


 


HDL Cholesterol  33 mg/dL (40-59)  L  10/25/20  11:20    


 


Cholesterol/HDL Ratio  3.60 %  10/25/20  11:20    


 


Urine Color  Yellow  (Yellow)   10/25/20  Unknown


 


Urine Turbidity  Clear  (Clear)   10/25/20  Unknown


 


Urine pH  7.0  (5.0-7.0)   10/25/20  Unknown


 


Ur Specific Gravity  1.012  (1.003-1.030)   10/25/20  Unknown


 


Urine Protein  100 mg/dl mg/dL (Negative)   10/25/20  Unknown


 


Urine Glucose (UA)  Neg mg/dL (Negative)   10/25/20  Unknown


 


Urine Ketones  Neg mg/dL (Negative)   10/25/20  Unknown


 


Urine Blood  Neg  (Negative)   10/25/20  Unknown


 


Urine Nitrite  Neg  (Negative)   10/25/20  Unknown


 


Urine Bilirubin  Neg  (Negative)   10/25/20  Unknown


 


Urine Urobilinogen  < 2.0 mg/dL (<2.0)   10/25/20  Unknown


 


Ur Leukocyte Esterase  Tr  (Negative)   10/25/20  Unknown


 


Urine WBC (Auto)  2.0 /HPF (0.0-6.0)   10/25/20  Unknown


 


Urine RBC (Auto)  1.0 /HPF (0.0-6.0)   10/25/20  Unknown


 


U Epithel Cells (Auto)  5.0 /HPF (0-13.0)   10/25/20  Unknown


 


Urine Bacteria (Auto)  1+ /HPF (Negative)   10/25/20  Unknown


 


Urine Mucus  Few /HPF  10/25/20  Unknown


 


Blood Type  B POSITIVE   10/25/20  11:50    


 


Antibody Screen  Negative   10/25/20  11:50    


 


Crossmatch  See Detail   10/25/20  11:50    














- Imaging and Cardiology


EKG: report reviewed, image reviewed


Chest x-ray: report reviewed, image reviewed


Cavazos/IV: 


                                        





IV Catheter Type [Left           INT / Saline Lock


Antecubital]                     











Assessment and Plan


Advance Directives: Yes


VTE prophylaxis?: Chemical


Plan of care discussed with patient/family: Yes





- Patient Problems


(1) Dyspnea


Current Visit: Yes   Status: Acute   


Qualifiers: 


   Dyspnea type: shortness of breath   Qualified Code(s): R06.02 - Shortness of 

breath; R06.00 - Dyspnea, unspecified; R06.01 - Orthopnea   


Plan to address problem: 


Shortness of breath appears to be secondary to anemia.  Radiograph normal.  Exam

on physical exam on lungs no crackles no wheezing.








(2) End-stage renal disease on peritoneal dialysis


Current Visit: Yes   Status: Acute   


Plan to address problem: 


Missed hemodialysis most likely etiology for shortness of breath.  Clinically I 

do not hear any evidence of volume overload.  Seems to be more symptomatic 

anemia.  Consult renal Dr. Roque.








(3) Hallucinosis


Current Visit: Yes   Status: Acute   


Plan to address problem: 


I am not sure about hallucinations.  Patient's is extremely well observant.  

Communicating well.  Concerns are just bizarre as mentioned previously in HPI.  

Not appear to be metabolic in any way.  Because her additional conversation she 

is alert with great cognition.  So does not a cognitive encephalopathic problem.

 More mental health.  Patient is observation this could clearly be followed up 

as outpatient.  Patient does not have any suicide ideas any risk not depressed. 

States she has been to a dermatologist before and was told what it was and she 

does not remember.  She does continue to follow dermatology.








(4) Hyperphosphatemia


Current Visit: Yes   Status: Acute   


Plan to address problem: 


Could be etiology of itching.  Will follow electrolytes.








(5) Symptomatic anemia


Current Visit: Yes   Status: Acute   


Plan to address problem: 


Transfuse 1 unit packed red blood cells in anticipation for discharge.

## 2020-10-25 NOTE — XRAY REPORT
CHEST PA AND LATERAL VIEWS



INDICATION: 

MAIN. 



COMPARISON: 

5/18/2017



FINDINGS:



Support devices: None 



Heart: Normal and unchanged 



Lungs/Pleura: No acute pulmonary or pleural findings.  





IMPRESSION:

1. No significant abnormality.



Signer Name: Ethan Dickinson MD 

Signed: 10/25/2020 10:50 AM

Workstation Name: [email or web address] ([id number]

## 2020-10-25 NOTE — CONSULTATION
History of Present Illness





- Reason for Consult


Consult date: 10/25/20


end stage renal disease





- History of Present Illness


This is a 70-year-old woman with end-stage renal disease on peritoneal dialysis 

who per chart review presented for shortness of breath and paroxysmal nocturnal 

dyspnea but upon my interview with her stated that she presented with concerns 

of blood and pus oozing from her skin from head to toe. Workup in the emergency 

department was notable for hemoglobin less than 7 and she was subsequently 

admitted for symptomatic anemia. She denies hematemesis, melena and hematochezia

but believes she has noted blood oozing from all over her skin.





She is on a cycler for her peritoneal dialysis and dialyzes for 8 hours.  She 

undergoes 4 cycles and uses 2.5% bags.  Her fill volume is 2 L. She has last 

fill - 1.5 L








Past History


Past Medical History: anemia, COPD, dialysis, ESRD, hypertension.  denies: acute

MI, atrial fib, arrhythmia, arthritis, cancer, diabetes, DVT, GERD, heart 

failure, hepatitis, HIV/AIDS, hyperthyroidism, hyperlipidemia, liver disease, 

pulmonary embolism, stroke, other


Past Surgical History: Other (PD)


Social history: , lives with family, full code.  denies: smoking, alcohol

abuse, prescription drug abuse


Family history: hypertension





Medications and Allergies


                                    Allergies











Allergy/AdvReac Type Severity Reaction Status Date / Time


 


digoxin Allergy  Hives Verified 02/17/16 11:21


 


lisinopril Allergy  Swelling Verified 02/17/16 11:21


 


nitroglycerin AdvReac  Rash, Verified 02/17/16 11:21





   HEADACHES  


 


shellfish derived AdvReac  Swelling Verified 02/17/16 11:21











                                Home Medications











 Medication  Instructions  Recorded  Confirmed  Last Taken  Type


 


NIFEdipine XL [Procardia Xl] 60 mg PO Q12HR 08/21/15 10/25/20 10/24/20 History


 


carvediloL [Coreg] 6.25 mg PO BID 08/21/15 10/25/20 10/24/20 History


 


Insulin Detemir (Nf) [Levemir 15 unit SQ QHS PRN 12/09/16 10/25/20 10/25/19 

History





Flextouch (Nf)]     


 


Insulin Lispro [HumaLOG VIAL] 0 units SQ AC PRN 12/09/16 10/25/20 10/25/19 

History


 


cloNIDine [Catapres] 0.2 mg PO QHS 12/09/16 10/25/20 10/24/20 History


 


Ibuprofen [Motrin] 600 mg PO Q8H PRN #20 tablet 10/04/18 10/25/20 Unknown Rx


 


Cholecalciferol (Vitamin D3) 3,000 unit PO DAILY 10/25/20 10/25/20 10/24/20 

History





[Vitamin D3 3,000 unit]     


 


Cinacalcet HCl [Sensipar] 60 mg PO DAILY 10/25/20 10/25/20 Unknown History


 


Gentamicin 0.1% Top Oint(Nf) 1 applic TP TID 10/25/20 10/25/20 Unknown History





[Gentamicin 0.1% Top Oint (Nf)]     











Active Meds: 


Active Medications





Acetaminophen (Tylenol)  650 mg PO Q4H PRN


   PRN Reason: Pain MILD(1-3)/Fever >100.5/HA


Carvedilol (Coreg)  6.25 mg PO BID [facility]


   Last Admin: 10/25/20 14:39 Dose:  6.25 mg


   Documented by: 


Cholecalciferol (Vitamin D3)  3,000 unit PO DAILY [facility]


Cinacalcet (Sensipar)  60 mg PO QDAY [facility]


Clonidine HCl (Catapres)  0.2 mg PO QHS [facility]


Dextrose (D50w (25gm) Syringe)  50 ml IV Q30MIN PRN; Protocol


   PRN Reason: Hypoglycemia


Insulin Glargine (Lantus)  22 units SUB-Q QHS PRN


   PRN Reason: HYPERGLYCEMIC


Insulin Human Lispro (Humalog)  0 unit SUB-Q Q6H [facility]; Protocol


   Last Admin: 10/25/20 14:37 Dose:  Not Given


   Documented by: 


Nifedipine (Procardia Xl)  60 mg PO Q12HR [facility]


Ondansetron HCl (Zofran)  4 mg IV Q8H PRN


   PRN Reason: Nausea And Vomiting


Oxycodone/Acetaminophen (Percocet 5/325)  1 tab PO Q6H PRN


   PRN Reason: Pain, Moderate (4-6)


Sodium Chloride (Sodium Chloride Flush Syringe 10 Ml)  10 ml IV BID ANTONY


Sodium Chloride (Sodium Chloride Flush Syringe 10 Ml)  10 ml IV PRN PRN


   PRN Reason: LINE FLUSH











Review of Systems


Constitutional: other (no fever or chills)


Eyes: bilateral: other (no itching or discharge)


Ears, nose, mouth and throat: other (no pain or congestion)


Cardiovascular: orthopnea, shortness of breath, paroxysmal nocturnal dyspnea, 

other (no leg edema)


Respiratory: shortness of breath (cough), other (no nausea or vomiting)


Gastrointestinal: other


Genitourinary Female: other (no pelvic or flank pain)


Musculoskeletal: other (no numbness or tingling)


Integumentary: other (states she has oozing of blood and pus from all over her 

skin)


Neurological: confusion, other


Psychiatric: hallucinations, confusion


Endocrine: other (no polydipsia or polyphagia)


Allergic/Immunologic: other (no urticaria or wheezing)





Exam





- Vital Signs


Vital signs: 


                                   Vital Signs











Temp Pulse BP Pulse Ox


 


 97.7 F   93 H  161/74   16 L


 


 10/25/20 10:03  10/25/20 10:03  10/25/20 10:03  10/25/20 10:03














Results





- Lab Results





                                 10/25/20 10:30





                                 10/25/20 10:30


                             Most recent lab results











Calcium  10.3 mg/dL (8.4-10.2)  H  10/25/20  10:30    


 


Phosphorus  7.60 mg/dL (2.5-4.5)  H  10/25/20  10:30    


 


Magnesium  2.00 mg/dL (1.7-2.3)   10/25/20  10:30    














Assessment and Plan


assessment





* End-stage renal disease on peritoneal dialysis


* Anemia.  No obvious bleed on exam but hemoglobin is less than 7


* Shortness of breath, CXR negative for consolidation. Likely secondary to 

  symptomatic anemia.  No signs of fluid overload.


* Essential hypertension


* Hypercalcemia, mild - on 2.5 ca solution


* Hyperphosphatemia


* Confusion/hallucinations


* hyperparathyroidism





recommendations





* Patient would like to continue using her cycler while inpatient.  Can continue

  her home prescription inpatient once acute processes are ruled out.  This 

  includes 8.5 hours on cycler, 4 exchanges, 2.5% bags, 2 L fill volumes and a 

  1.5 L last fill


* Although no focal deficits are present, recommend checking CT head to rule out

  temporal stroke


* Recommend psych consult


* Hold Epogen until acute process concerning CT head is ruled out


* Transfuse for hemoglobin less than 7


* Hold home PhosLo binders


* Start Renvela


* Continue home antihypertensives


* Renally dose medications


* ESRD diet

## 2020-10-25 NOTE — EVENT NOTE
ED Screening Note


Date of service: 10/25/20


Time: 10:05


ED Screening Note: 


The patient was evaluated in the emergency department for symptoms described in 

the history of present illness.  He/she was evaluated in the context of the 

global COVID-19 pandemic, which necessitated consideration that the patient 

might be at risk for infection with the virus that causes COVID-19.  

Institutional protocols and algorithms that pertain to the evaluation of 

patients at risk for COVID-19 are in a state of rapid change based on 

information released by regulatory bodies including the CDC and federal and 

state organizations.  These policies and algorithms were followed during the 

patient's care in the emergency department.  Please note that these policies, 

procedures and recommendations changed on a rapid basis.














70-year-old -American female with a present history of end-stage renal 

disease on dialysis, diabetes presents to the emergency room for shortness of 

breath and generalized itching and generalized feeling like glasses all over 

her.  And reports that her fingertips feel like they are swollen and will have 

glue-like substance coming from the tips that would sometimes be red.  Is 

brought in by her .





This initial assessment/diagnostic orders/clinical plan/treatment(s) is/are 

subject to change based on patients health status, clinical progression and re-

assessment by fellow clinical providers in the ED. Further treatment and workup 

at subsequent clinical providers discretion. Patient/guardian urged not to elope

from the ED as their condition may be serious if not clinically assessed and 

managed. 





Initial orders include: 


Main


CBC, CMP, urinalysis, fosters, magnesium, chest x-ray.

## 2020-10-26 VITALS — DIASTOLIC BLOOD PRESSURE: 88 MMHG | SYSTOLIC BLOOD PRESSURE: 165 MMHG

## 2020-10-26 LAB
BASOPHILS # (AUTO): 0.1 K/MM3 (ref 0–0.1)
BASOPHILS NFR BLD AUTO: 1.3 % (ref 0–1.8)
BUN SERPL-MCNC: 73 MG/DL (ref 7–17)
BUN/CREAT SERPL: 5 %
CALCIUM SERPL-MCNC: 9.4 MG/DL (ref 8.4–10.2)
EOSINOPHIL # BLD AUTO: 0.8 K/MM3 (ref 0–0.4)
EOSINOPHIL NFR BLD AUTO: 8.4 % (ref 0–4.3)
HCT VFR BLD CALC: 21 % (ref 30.3–42.9)
HEMOLYSIS INDEX: 1
HGB BLD-MCNC: 6.9 GM/DL (ref 10.1–14.3)
LYMPHOCYTES # BLD AUTO: 1 K/MM3 (ref 1.2–5.4)
LYMPHOCYTES NFR BLD AUTO: 10.4 % (ref 13.4–35)
MCHC RBC AUTO-ENTMCNC: 33 % (ref 30–34)
MCV RBC AUTO: 95 FL (ref 79–97)
MONOCYTES # (AUTO): 0.8 K/MM3 (ref 0–0.8)
MONOCYTES % (AUTO): 8.1 % (ref 0–7.3)
PLATELET # BLD: 243 K/MM3 (ref 140–440)
RBC # BLD AUTO: 2.22 M/MM3 (ref 3.65–5.03)

## 2020-10-26 RX ADMIN — INSULIN LISPRO SCH: 100 INJECTION, SOLUTION INTRAVENOUS; SUBCUTANEOUS at 21:35

## 2020-10-26 RX ADMIN — INSULIN LISPRO SCH: 100 INJECTION, SOLUTION INTRAVENOUS; SUBCUTANEOUS at 15:07

## 2020-10-26 RX ADMIN — INSULIN LISPRO SCH: 100 INJECTION, SOLUTION INTRAVENOUS; SUBCUTANEOUS at 03:03

## 2020-10-26 RX ADMIN — NIFEDIPINE SCH MG: 60 TABLET, EXTENDED RELEASE ORAL at 10:11

## 2020-10-26 RX ADMIN — OXYCODONE AND ACETAMINOPHEN PRN TAB: 5; 325 TABLET ORAL at 21:48

## 2020-10-26 RX ADMIN — Medication SCH ML: at 21:40

## 2020-10-26 RX ADMIN — INSULIN LISPRO SCH: 100 INJECTION, SOLUTION INTRAVENOUS; SUBCUTANEOUS at 07:26

## 2020-10-26 RX ADMIN — OXYCODONE AND ACETAMINOPHEN PRN TAB: 5; 325 TABLET ORAL at 11:25

## 2020-10-26 RX ADMIN — Medication SCH ML: at 10:12

## 2020-10-26 RX ADMIN — NIFEDIPINE SCH MG: 60 TABLET, EXTENDED RELEASE ORAL at 21:46

## 2020-10-26 NOTE — CONSULTATION
History of Present Illness





- Reason for Consult


Consult date: 10/26/20


Reason for consult: Hallucinations





- History of Present Psychiatric Illness


Laura Bahena is a 71y/o female patient who presented to the ER for shortness

of breath, itching and feeling like glass was over her. The patient is a 

dialysis patient. During my interview with the patient she is sitting on side of

the bed. She is a/o x 3. She is calm and cooperative. She is pleasant and 

conversational. When asking the patient did she understand why psychiatry was 

consulted. She replied "no, I've never had any problems like that a day in my 

life." I informed the patient that it was stated that she was hallucinating. The

patient states "who said that. I think people need to listen more around here." 

The patient states, "they are only saying that because I told them that my skin 

is itching real bad and it feels like stuff is coming out of my skin." She pulls

out her phone to show me pictures she's taken of her feet and her skin. It 

appears to be severely dry and scaly skin the patient is referring to. She 

denies SI/HI, stating, "no, no I love me. Not gone hurt nobody else either." The

patient also denies a past history of suicidal attempt. She states "I've never 

did anything like that in my life." She denies any fear or feelings of 

endangerment. She also denies any illicit drug use, alcohol or nicotine. The 

patient says,"I've never did drugs or even smoked a cigarette a day in my life. 

I don't even drink coffee." She denies any past psychiatric history; ever seeing

a psychiatrist or being on any psychiatric medications. 





Called number listed [phone] to obtain collateral and history. Did not 

receive an answer. 





PAST PSYCHIATRIC HISTORY:


Diagnoses: Denies


Suicide attempts or Self-harm behavior: Denies


Prior psychiatric hospitalizations: Denies


Substance Abuse history: Denies


Previous psychiatric medications tried: Denies


Outpatient treatment: Denies





PAST MEDICAL HISTORY: Renal failure, dialysis





Family Psychiatric History: None reported or documented





SOCIAL HISTORY


Marital Status: 


Living Arrangements: with spouse


Employment Status: Disabled


Access to guns/weapons: Denies


Education: 


History of Abuse: None reported


Legal History: None reported





REVIEW OF SYSTEMS


Constitutional: Negative for weight loss


ENT: Negative for stridor


Respiratory: Negative for cough or hemoptysis


All other systems reviewed and are negative





MENTAL STATUS EXAMINATION


General Appearance: Dressed appropriately


Behavior: good eye contact, calm and cooperative, pleasant 


Cooperation: Participating/engaged


Psychomotor Behavior: Psychomotor normal


Mood: "good if I could get rid of this itching"


Affect and affective range:  congruent with mood


Thought Process: goal oriented


Thought Content: within reality


Speech: Normal rate, volume and rhythm


Suicidal Ideation: denies SI


Homicidal Ideation: Denies HI


Hallucinations: Denies


Delusions: Yes, somatic


Impulse Control: unimpaired


Insight and Judgment: Limited insight and judgment


Memory: Normal


Attention:  Normal


Orientation: Alert, oriented





 Assessment and Plan 


Delusional Disorder





TREATMENT


Start Risperidone 0.25mg po BID


Sitter: Defer to primary


Medical: Per priamary


Disposition: Do not recommend acute inpatient psychiatric treatment at this 

time. Although, the patient does have some somatic delusions, they do not appear

to affect the patient's medical care, activities of daily living, or pose a thr

eat to her safety or the safety of others in any way. 


The  to give the patient resources for outpatient psychiatry and 

cognitive behavior therapy


The patient to follow up with outpatient psychiatry in 7 to 14 days upon 

discharge


Will sign off. Thank you for this consult. 





Medications and Allergies


                                    Allergies











Allergy/AdvReac Type Severity Reaction Status Date / Time


 


digoxin Allergy  Hives Verified 02/17/16 11:21


 


lisinopril Allergy  Swelling Verified 02/17/16 11:21


 


nitroglycerin AdvReac  Rash, Verified 02/17/16 11:21





   HEADACHES  


 


shellfish derived AdvReac  Swelling Verified 02/17/16 11:21











                                Home Medications











 Medication  Instructions  Recorded  Confirmed  Last Taken  Type


 


NIFEdipine XL [Procardia Xl] 60 mg PO Q12HR 08/21/15 10/25/20 10/24/20 History


 


carvediloL [Coreg] 6.25 mg PO BID 08/21/15 10/25/20 10/24/20 History


 


Insulin Detemir (Nf) [Levemir 15 unit SQ QHS PRN 12/09/16 10/25/20 10/25/19 

History





Flextouch (Nf)]     


 


Insulin Lispro [HumaLOG VIAL] 0 units SQ AC PRN 12/09/16 10/25/20 10/25/19 

History


 


cloNIDine [Catapres] 0.2 mg PO QHS 12/09/16 10/25/20 10/24/20 History


 


Ibuprofen [Motrin] 600 mg PO Q8H PRN #20 tablet 10/04/18 10/25/20 Unknown Rx


 


Cholecalciferol (Vitamin D3) 3,000 unit PO DAILY 10/25/20 10/25/20 10/24/20 

History





[Vitamin D3 3,000 unit]     


 


Cinacalcet HCl [Sensipar] 60 mg PO DAILY 10/25/20 10/25/20 Unknown History


 


Gentamicin 0.1% Top Oint(Nf) 1 applic TP TID 10/25/20 10/25/20 Unknown History





[Gentamicin 0.1% Top Oint (Nf)]     


 


risperiDONE [RisperDAL] 0.25 mg PO BID #60 tab 10/26/20  Unknown Rx











Active Meds: 


Active Medications





Acetaminophen (Tylenol)  650 mg PO Q4H PRN


   PRN Reason: Pain MILD(1-3)/Fever >100.5/HA


Carvedilol (Coreg)  6.25 mg PO BID [facility]


   Last Admin: 10/26/20 10:11 Dose:  6.25 mg


   Documented by: 


Cholecalciferol (Vitamin D3)  3,000 unit PO DAILY [facility]


   Last Admin: 10/26/20 10:11 Dose:  3,000 unit


   Documented by: 


Cinacalcet (Sensipar)  60 mg PO QDAY [facility]


   Last Admin: 10/26/20 10:11 Dose:  60 mg


   Documented by: 


Clonidine HCl (Catapres)  0.2 mg PO QHS [facility]


   Last Admin: 10/25/20 21:58 Dose:  0.2 mg


   Documented by: 


Dextrose (D50w (25gm) Syringe)  50 ml IV Q30MIN PRN; Protocol


   PRN Reason: Hypoglycemia


Insulin Glargine (Lantus)  22 units SUB-Q QHS PRN


   PRN Reason: HYPERGLYCEMIC


Insulin Human Lispro (Humalog)  0 unit SUB-Q Q6H [facility]; Protocol


   Last Admin: 10/26/20 07:26 Dose:  Not Given


   Documented by: 


Nifedipine (Procardia Xl)  60 mg PO Q12HR [facility]


   Last Admin: 10/26/20 10:11 Dose:  60 mg


   Documented by: 


Ondansetron HCl (Zofran)  4 mg IV Q8H PRN


   PRN Reason: Nausea And Vomiting


Oxycodone/Acetaminophen (Percocet 5/325)  1 tab PO Q6H PRN


   PRN Reason: Pain, Moderate (4-6)


   Last Admin: 10/26/20 11:25 Dose:  1 tab


   Documented by: 


Sodium Chloride (Sodium Chloride Flush Syringe 10 Ml)  10 ml IV BID ANTONY


   Last Admin: 10/26/20 10:12 Dose:  10 ml


   Documented by: 


Sodium Chloride (Sodium Chloride Flush Syringe 10 Ml)  10 ml IV PRN PRN


   PRN Reason: LINE FLUSH











Mental Status Exam





- Vital signs


                                Last Vital Signs











Temp  97.0 F L  10/26/20 05:59


 


Pulse  82   10/26/20 05:59


 


Resp  16   10/26/20 05:59


 


BP  124/65   10/26/20 05:59


 


Pulse Ox  96   10/26/20 10:00














Results


Result Diagrams: 


                                 10/26/20 07:01





                                 10/26/20 07:01


                              Abnormal lab results











  10/25/20 10/25/20 10/25/20 Range/Units





  11:20 11:50 22:56 


 


RBC     (3.65-5.03)  M/mm3


 


Hgb     (10.1-14.3)  gm/dl


 


Hct     (30.3-42.9)  %


 


RDW     (13.2-15.2)  %


 


Lymph % (Auto)     (13.4-35.0)  %


 


Mono % (Auto)     (0.0-7.3)  %


 


Eos % (Auto)     (0.0-4.3)  %


 


Lymph # (Auto)     (1.2-5.4)  K/mm3


 


Eos # (Auto)     (0.0-0.4)  K/mm3


 


Seg Neutrophils %     (40.0-70.0)  %


 


Potassium     (3.6-5.0)  mmol/L


 


Chloride     ()  mmol/L


 


BUN     (7-17)  mg/dL


 


Creatinine     (0.6-1.2)  mg/dL


 


POC Glucose    110 H  ()  mg/dL


 


HDL Cholesterol  33 L    (40-59)  mg/dL


 


Crossmatch   See Detail   














  10/26/20 10/26/20 Range/Units





  07:01 07:01 


 


RBC  2.22 L   (3.65-5.03)  M/mm3


 


Hgb  6.9 L   (10.1-14.3)  gm/dl


 


Hct  21.0 L   (30.3-42.9)  %


 


RDW  17.4 H   (13.2-15.2)  %


 


Lymph % (Auto)  10.4 L   (13.4-35.0)  %


 


Mono % (Auto)  8.1 H   (0.0-7.3)  %


 


Eos % (Auto)  8.4 H   (0.0-4.3)  %


 


Lymph # (Auto)  1.0 L   (1.2-5.4)  K/mm3


 


Eos # (Auto)  0.8 H   (0.0-0.4)  K/mm3


 


Seg Neutrophils %  71.8 H   (40.0-70.0)  %


 


Potassium   5.1 H  (3.6-5.0)  mmol/L


 


Chloride   97.4 L  ()  mmol/L


 


BUN   73 H  (7-17)  mg/dL


 


Creatinine   15.7 H  (0.6-1.2)  mg/dL


 


POC Glucose    ()  mg/dL


 


HDL Cholesterol    (40-59)  mg/dL


 


Crossmatch    








All other labs normal.

## 2020-10-26 NOTE — PROGRESS NOTE
Subjective


Principal diagnosis: Symptomatic anemia


Interval history: 


Patient was seen today for follow-up of multiple renal related issues


No complaints of any chest pain pressure or shortness of breath


her peritoneal dialysis has been going well


Interdisciplinary notes that also reviewed


she feels much better, patient has also seen Dr. Corrales in the pastAnd is willing

to follow-up with her as well


Her shortness of breath is in complete remission


Events of 24 hours vitals labs intake output medications were reviewed








Past medical history: Reviewed


Family history: Reviewed


Social history: Reviewed


Allergies: Reviewed








Physical examination:


Vitals: Reviewed


HEENT: No pallor or icterus oral mucosa moist 


Neck: Supple no JVD no thyromegaly


Chest: Bilateral clear to auscultation anteriorly


Heart: Regular rate and rhythm S1-S2 heard no S3-S4


Abdomen: Soft nontender no voluntary guarding rigidity rebound


Extremity: Dry skin less than 1+ peripheral edema


Psychiatric: No evidence of agitation and aggression noted


Dermatology: No petechial rashes





Labs and x-rays: Reviewed from today





Assessment and plan


ESRD currently on peritoneal dialysis, continue the cycler for now, she is 

currently followed at [facility]


Anemia in end-stage renal disease, current hemoglobin 6.2 during this admission 

needs further workup and evaluation and follow-up with the hematology primary 

care physician as well as gastroenterologist


Mild hyperkalemia to monitor and follow


Shortness of breath: Multifactorial chest x-ray did not show any evidence of 

congestive heart failure likely this could be resulting from anemia, would also 

recommend an echocardiogram


Bone mineral disorder and secondary hyperparathyroidism we will continue to 

monitor and follow next


Hypertension and volume will monitor and follow


We'll continue to follow and make recommendation for renal standpoint


Patient was adequately counseled and educated regarding all the renal related 

issues


Laboratory studies, have been explained to the patient


All questions were answered and simple English


We'll continue to follow and make recommendation for renal standpoint








Objective





- Vital Signs


Vital signs: 


                               Vital Signs - 12hr











  10/25/20 10/25/20 10/25/20





  21:53 21:57 21:58


 


Temperature 98.3 F  


 


Pulse Rate 99 H 99 H 99 H


 


Respiratory 20  





Rate   


 


Blood Pressure 154/68 154/68 154/68


 


O2 Sat by Pulse 94  





Oximetry   














  10/26/20 10/26/20





  01:39 05:59


 


Temperature  97.0 F L


 


Pulse Rate  82


 


Respiratory 20 16





Rate  


 


Blood Pressure  124/65


 


O2 Sat by Pulse  96





Oximetry  














- Lab





                                 10/26/20 07:01





                                 10/26/20 07:01


                             Most recent lab results











Calcium  9.4 mg/dL (8.4-10.2)   10/26/20  07:01    


 


Phosphorus  7.60 mg/dL (2.5-4.5)  H  10/25/20  10:30    


 


Magnesium  2.00 mg/dL (1.7-2.3)   10/25/20  10:30    














Medications & Allergies





- Medications


Allergies/Adverse Reactions: 


                                    Allergies





digoxin Allergy (Verified 02/17/16 11:21)


   Hives


lisinopril Allergy (Verified 02/17/16 11:21)


   Swelling


nitroglycerin Adverse Reaction (Verified 02/17/16 11:21)


   Rash, HEADACHES


shellfish derived Adverse Reaction (Verified 02/17/16 11:21)


   Swelling








Home Medications: 


                                Home Medications











 Medication  Instructions  Recorded  Confirmed  Last Taken  Type


 


RX: NIFEdipine XL [Procardia Xl] 60 mg PO Q12HR 08/21/15 10/25/20 10/24/20 

History


 


RX: carvediloL [Coreg] 6.25 mg PO BID 08/21/15 10/25/20 10/24/20 History


 


RX: Insulin Detemir (Nf) [Levemir 15 unit SQ QHS PRN 12/09/16 10/25/20 10/25/19 

History





Flextouch (Nf)]     


 


RX: Insulin Lispro [HumaLOG VIAL] 0 units SQ AC PRN 12/09/16 10/25/20 10/25/19 

History


 


RX: cloNIDine [Catapres] 0.2 mg PO QHS 12/09/16 10/25/20 10/24/20 History


 


RX: Ibuprofen [Motrin 600 MG tab] 600 mg PO Q8H PRN #20 tablet 10/04/18 10/25/20

Unknown Rx


 


RX: Cholecalciferol (Vitamin D3) 3,000 unit PO DAILY 10/25/20 10/25/20 10/24/20 

History





[Vitamin D3 3,000 unit]     


 


RX: Cinacalcet HCl [Sensipar] 60 mg PO DAILY 10/25/20 10/25/20 Unknown History


 


RX: Gentamicin 0.1% Top Oint(Nf) 1 applic TP TID 10/25/20 10/25/20 Unknown 

History





[Gentamicin 0.1% Top Oint (Nf)]     


 


RX: Acetaminophen [Acetaminophen 650 mg PO Q4H PRN  tablet 10/26/20  Unknown Rx





TAB]     


 


RX: Cholecalciferol Vit D3 3,000 unit PO DAILY  tablet 10/26/20  Unknown Rx





[Vitamin D3 1,000 UNIT TAB]     


 


RX: Cinacalcet [Sensipar] 60 mg PO QDAY  tablet 10/26/20  Unknown Rx


 


RX: Insulin Glargine [Lantus VIAL] 22 units SUB-Q QHS PRN  units 10/26/20  

Unknown Rx


 


RX: Insulin Lispro [Humalog] 0 unit SUB-Q Q6H  vial 10/26/20  Unknown Rx


 


RX: Sodium Chloride 0.9% Int 10 ml IV PRN PRN  syringe 10/26/20  Unknown Rx





[Sodium Chloride Flush Syringe 10     





ml]     


 


RX: risperiDONE [RisperDAL] 0.25 mg PO BID #60 tab 10/26/20  Unknown Rx


 


RX: risperiDONE [RisperDAL] 0.25 mg PO BID #60 tablet 10/26/20  Unknown Rx











Active Medications: 














Generic Name Dose Route Start Last Admin





  Trade Name Freq  PRN Reason Stop Dose Admin


 


Acetaminophen  650 mg  10/25/20 12:57 





  Tylenol  PO  





  Q4H PRN  





  Pain MILD(1-3)/Fever >100.5/HA  


 


Carvedilol  6.25 mg  10/25/20 13:00  10/25/20 21:57





  Coreg  PO   6.25 mg





  BID ANTONY   Administration


 


Cholecalciferol  3,000 unit  10/26/20 10:00 





  Vitamin D3  PO  





  DAILY ANTONY  


 


Cinacalcet  60 mg  10/26/20 10:00 





  Sensipar  PO  





  QDAY ANTONY  


 


Clonidine HCl  0.2 mg  10/25/20 22:00  10/25/20 21:58





  Catapres  PO   0.2 mg





  QHS ANTONY   Administration


 


Dextrose  50 ml  10/25/20 12:58 





  D50w (25gm) Syringe  IV  





  Q30MIN PRN  





  Hypoglycemia  





  Protocol  


 


Insulin Glargine  22 units  10/25/20 13:11 





  Lantus  SUB-Q  





  QHS PRN  





  HYPERGLYCEMIC  


 


Insulin Human Lispro  0 unit  10/25/20 13:00  10/26/20 07:26





  Humalog  SUB-Q   Not Given





  Q6H ANTONY  





  Protocol  


 


Nifedipine  60 mg  10/25/20 22:00  10/25/20 21:58





  Procardia Xl  PO   60 mg





  Q12HR ANTONY   Administration


 


Ondansetron HCl  4 mg  10/25/20 12:57 





  Zofran  IV  





  Q8H PRN  





  Nausea And Vomiting  


 


Oxycodone/Acetaminophen  1 tab  10/25/20 12:53 





  Percocet 5/325  PO  





  Q6H PRN  





  Pain, Moderate (4-6)  


 


Sodium Chloride  10 ml  10/25/20 22:00  10/25/20 21:58





  Sodium Chloride Flush Syringe 10 Ml  IV   10 ml





  BID ANTONY   Administration


 


Sodium Chloride  10 ml  10/25/20 12:57 





  Sodium Chloride Flush Syringe 10 Ml  IV  





  PRN PRN  





  LINE FLUSH

## 2020-10-26 NOTE — PROGRESS NOTE
Assessment and Plan





- Patient Problems


(1) Dyspnea


Current Visit: Yes   Status: Acute   


Qualifiers: 


   Dyspnea type: shortness of breath   Qualified Code(s): R06.02 - Shortness of 

breath; R06.00 - Dyspnea, unspecified; R06.01 - Orthopnea   





(2) End-stage renal disease on peritoneal dialysis


Current Visit: Yes   Status: Acute   





(3) Hallucinosis


Current Visit: Yes   Status: Acute   





(4) Hyperphosphatemia


Current Visit: Yes   Status: Acute   





(5) Symptomatic anemia


Current Visit: Yes   Status: Acute   





Subjective


Date of service: 10/26/20


Principal diagnosis: Symptomatic anemia


Interval history: 





Status post transfusion patient alert oriented x3.  Still has hallucinations.  

Appears more psychiatric because patients cognition is excellent.  Rule out 

acute process CT scan head mental health pending.  Most likely can be done as 

outpatient after repeat H&H.





Objective





- Constitutional


Vitals: 


                               Vital Signs - 12hr











  10/25/20 10/25/20 10/25/20





  21:53 21:57 21:58


 


Temperature 98.3 F  


 


Pulse Rate 99 H 99 H 99 H


 


Respiratory 20  





Rate   


 


Blood Pressure 154/68 154/68 154/68


 


O2 Sat by Pulse 94  





Oximetry   














  10/26/20 10/26/20





  01:39 05:59


 


Temperature  97.0 F L


 


Pulse Rate  82


 


Respiratory 20 16





Rate  


 


Blood Pressure  124/65


 


O2 Sat by Pulse  96





Oximetry  














- Labs


CBC & Chem 7: 


                                 10/25/20 10:30





                                 10/25/20 10:30


Labs: 


                              Abnormal lab results











  10/25/20 10/25/20 10/25/20 Range/Units





  10:30 10:30 10:30 


 


RBC  2.03 L    (3.65-5.03)  M/mm3


 


Hgb  6.2 L    (10.1-14.3)  gm/dl


 


Hct  19.5 L*    (30.3-42.9)  %


 


RDW  19.9 H    (13.2-15.2)  %


 


Lymph % (Auto)  11.9 L    (13.4-35.0)  %


 


Mono % (Auto)  8.8 H    (0.0-7.3)  %


 


Eos % (Auto)  9.2 H    (0.0-4.3)  %


 


Baso % (Auto)  1.9 H    (0.0-1.8)  %


 


Lymph # (Auto)  1.1 L    (1.2-5.4)  K/mm3


 


Eos # (Auto)  0.8 H    (0.0-0.4)  K/mm3


 


Baso # (Auto)  0.2 H    (0.0-0.1)  K/mm3


 


PT     (12.2-14.9)  Sec.


 


INR     (0.87-1.13)  


 


Chloride   97.7 L   ()  mmol/L


 


BUN   64 H   (7-17)  mg/dL


 


Creatinine   13.8 H   (0.6-1.2)  mg/dL


 


POC Glucose     ()  mg/dL


 


Calcium   10.3 H   (8.4-10.2)  mg/dL


 


Phosphorus    7.60 H  (2.5-4.5)  mg/dL


 


ALT   6 L   (7-56)  units/L


 


Alkaline Phosphatase   138 H   ()  units/L


 


Troponin T     (0.00-0.029)  ng/mL


 


NT-Pro-B Natriuret Pep     (0-900)  pg/mL


 


Albumin   3.8 L   (3.9-5)  g/dL


 


HDL Cholesterol     (40-59)  mg/dL


 


Crossmatch     














  10/25/20 10/25/20 10/25/20 Range/Units





  11:20 11:20 11:50 


 


RBC     (3.65-5.03)  M/mm3


 


Hgb     (10.1-14.3)  gm/dl


 


Hct     (30.3-42.9)  %


 


RDW     (13.2-15.2)  %


 


Lymph % (Auto)     (13.4-35.0)  %


 


Mono % (Auto)     (0.0-7.3)  %


 


Eos % (Auto)     (0.0-4.3)  %


 


Baso % (Auto)     (0.0-1.8)  %


 


Lymph # (Auto)     (1.2-5.4)  K/mm3


 


Eos # (Auto)     (0.0-0.4)  K/mm3


 


Baso # (Auto)     (0.0-0.1)  K/mm3


 


PT  10.7 L    (12.2-14.9)  Sec.


 


INR  0.76 L    (0.87-1.13)  


 


Chloride     ()  mmol/L


 


BUN     (7-17)  mg/dL


 


Creatinine     (0.6-1.2)  mg/dL


 


POC Glucose     ()  mg/dL


 


Calcium     (8.4-10.2)  mg/dL


 


Phosphorus     (2.5-4.5)  mg/dL


 


ALT     (7-56)  units/L


 


Alkaline Phosphatase     ()  units/L


 


Troponin T   0.050 H   (0.00-0.029)  ng/mL


 


NT-Pro-B Natriuret Pep   8264 H   (0-900)  pg/mL


 


Albumin     (3.9-5)  g/dL


 


HDL Cholesterol   33 L   (40-59)  mg/dL


 


Crossmatch    See Detail  














  10/25/20 Range/Units





  22:56 


 


RBC   (3.65-5.03)  M/mm3


 


Hgb   (10.1-14.3)  gm/dl


 


Hct   (30.3-42.9)  %


 


RDW   (13.2-15.2)  %


 


Lymph % (Auto)   (13.4-35.0)  %


 


Mono % (Auto)   (0.0-7.3)  %


 


Eos % (Auto)   (0.0-4.3)  %


 


Baso % (Auto)   (0.0-1.8)  %


 


Lymph # (Auto)   (1.2-5.4)  K/mm3


 


Eos # (Auto)   (0.0-0.4)  K/mm3


 


Baso # (Auto)   (0.0-0.1)  K/mm3


 


PT   (12.2-14.9)  Sec.


 


INR   (0.87-1.13)  


 


Chloride   ()  mmol/L


 


BUN   (7-17)  mg/dL


 


Creatinine   (0.6-1.2)  mg/dL


 


POC Glucose  110 H  ()  mg/dL


 


Calcium   (8.4-10.2)  mg/dL


 


Phosphorus   (2.5-4.5)  mg/dL


 


ALT   (7-56)  units/L


 


Alkaline Phosphatase   ()  units/L


 


Troponin T   (0.00-0.029)  ng/mL


 


NT-Pro-B Natriuret Pep   (0-900)  pg/mL


 


Albumin   (3.9-5)  g/dL


 


HDL Cholesterol   (40-59)  mg/dL


 


Crossmatch   














HEART Score





- HEART Score


Troponin: 


                                        











Troponin T  0.050 ng/mL (0.00-0.029)  H  10/25/20  11:20

## 2020-10-26 NOTE — DISCHARGE SUMMARY
Providers





- Providers


Date of Admission: 


10/25/20 11:56





Date of discharge: 10/26/20


Attending physician: 


SASHA PAEZ





                                        





10/25/20 12:59


Consult to Physician [CONS] Routine 


   Comment: 


   Consulting Provider: BASSEM DANIEL


   Physician Instructions: 


   Reason For Exam: esrd





10/26/20 07:36


Consult to Mental Health [CONS] Routine 


   Reason For Exam: hallucinations





10/26/20 11:13


Consult to Wound/ET Nurse [CONS] Routine 


   Reason For Exam: wound eval











Primary care physician: 


PRIMARY CARE MD








Hospitalization


Condition: Stable


Hospital course: 





Patient presented with symptomatic anemia.  Patient with history of end-stage 

renal disease treated with peritoneal dialysis.  Patient did not finish last 

dialysis and presented with some malaise.  Work-up in ED patient found to be 

anemic.  Patient did have some somatic hallucinations.  That seem to be 

consistent with possible metabolic etiology such as hypophosphatemia and uremia.

  Mental health consult was obtained.  Patient was started on risperidone twice 

daily and to follow-up with psychiatry in 7 to 14 days.


Disposition: DC-01 TO HOME OR SELFCARE





- Discharge Diagnoses


(1) Dyspnea


Status: Acute   


Qualifiers: 


   Dyspnea type: shortness of breath   Qualified Code(s): R06.02 - Shortness of 

breath; R06.00 - Dyspnea, unspecified; R06.01 - Orthopnea   


Comment: Dyspnea resolved upon hospitalization.  Patient up talking at her 

baseline.   





(2) End-stage renal disease on peritoneal dialysis


Status: Acute   Comment: Renal consult was obtained patient maintain her home pe

ritoneal dialysis regime.   





(3) Hallucinosis


Status: Acute   Comment: Disposition: Do not recommend acute inpatient 

psychiatric treatment at this time. Although, the patient does have some somatic

 delusions, they do not appear to affect the patient's medical care, activities 

of daily living, or pose a threat to her safety or the safety of others in any 

way. 


The  to give the patient resources for outpatient psychiatry and 

cognitive behavior therapy


The patient to follow up with outpatient psychiatry in 7 to 14 days upon d

ischarge


. 


   





(4) Hyperphosphatemia


Status: Acute   Comment: Renvela started.  PhosLo discontinued.   





(5) Symptomatic anemia


Status: Acute   Comment: Resolved with transfusion.  Patient given additional 

unit transfusion because hemoglobin was 6.   





Core Measure Documentation





- Palliative Care


Palliative Care/ Comfort Measures: Not Applicable





- Core Measures


Any of the following diagnoses?: none





Exam





- Constitutional


Vitals: 


                                        











Temp Pulse Resp BP Pulse Ox


 


 98.4 F   80   16   165/88   100 


 


 10/26/20 12:02  10/26/20 12:02  10/26/20 12:02  10/26/20 12:02  10/26/20 12:02











General appearance: Present: no acute distress, well-nourished





- EENT


Eyes: Present: PERRL


ENT: hearing intact, clear oral mucosa





- Neck


Neck: Present: supple, normal ROM





- Respiratory


Respiratory effort: normal


Respiratory: bilateral: CTA





- Cardiovascular


Heart Sounds: Present: S1 & S2.  Absent: rub, click





- Extremities


Extremities: pulses symmetrical, No edema


Peripheral Pulses: within normal limits





- Abdominal


General gastrointestinal: Present: soft, non-tender, non-distended, normal bowel

 sounds


Female genitourinary: Present: normal





- Integumentary


Integumentary: Present: clear, warm, dry





- Musculoskeletal


Musculoskeletal: gait normal, strength equal bilaterally





- Psychiatric


Psychiatric: appropriate mood/affect, intact judgment & insight





- Neurologic


Neurologic: CNII-XII intact, moves all extremities





Plan


Activity: no restrictions, fall precautions


Weight Bearing Status: Full Weight Bearing


Diet: renal


Follow up with: 


PRIMARY CARE,MD [Primary Care Provider] - 3-5 Days


Prescriptions: 


risperiDONE [RisperDAL] 0.25 mg PO BID #60 tab


risperiDONE [RisperDAL] 0.25 mg PO BID #60 tablet

## 2020-10-27 NOTE — PROGRESS NOTE
Subjective


Principal diagnosis: Symptomatic anemia


Interval history: 


Patient was seen today for follow-up of multiple renal related issues, around 

9:30 in the morning,


She is willing to make an appointment for follow-up with hematologist


She is also going to see her dialysis nurse post discharge for a quick follow-up


 did not had any issues with her dialysis today


Events of 24 hours vitals labs intake output medications were reviewed








Past medical history: Reviewed


Family history: Reviewed


Social history: Reviewed


Allergies: Reviewed








Physical examination:


Vitals: Reviewed


HEENT: No pallor or icterus oral mucosa moist 


Neck: Supple no JVD no thyromegaly


Chest: Bilateral clear to auscultation anteriorly


Heart: Regular rate and rhythm S1-S2 heard no S3-S4


Abdomen: Soft nontender no voluntary guarding rigidity rebound


Extremity: Dry skin less than 1+ peripheral edema


Psychiatric: No evidence of agitation and aggression noted


Dermatology: No petechial rashes





Labs and x-rays: Reviewed from today





Assessment and plan


ESRD currently on peritoneal dialysis, continue the cycler for now, she is 

currently followed at [facility], She is going to go for follow-up


Anemia in end-stage renal disease, current hemoglobin 6.2 during this admission 


he was advised to make an appointment for follow-up with hematology as well as, 

GI and primary physician


Mild hyperkalemia to monitor and follow


Shortness of breath: Multifactorial chest x-ray did not show any evidence of 

congestive heart failure likely this could be resulting from anemia, would also 

recommend an echocardiogram


Bone mineral disorder and secondary hyperparathyroidism we will continue to 

monitor and follow next


Hypertension and volume will monitor and follow


She is being discharged today and will follow up at the dialysis facility


We'll continue to follow and make recommendation for renal standpoint








Objective





- Lab





                                 10/26/20 07:01





                                 10/26/20 07:01


                             Most recent lab results











Calcium  9.4 mg/dL (8.4-10.2)   10/26/20  07:01    


 


Phosphorus  7.60 mg/dL (2.5-4.5)  H  10/25/20  10:30    


 


Magnesium  2.00 mg/dL (1.7-2.3)   10/25/20  10:30    














Medications & Allergies





- Medications


Allergies/Adverse Reactions: 


                                    Allergies





digoxin Allergy (Verified 02/17/16 11:21)


   Hives


lisinopril Allergy (Verified 02/17/16 11:21)


   Swelling


nitroglycerin Adverse Reaction (Verified 02/17/16 11:21)


   Rash, HEADACHES


shellfish derived Adverse Reaction (Verified 02/17/16 11:21)


   Swelling








Home Medications: 


                                Home Medications











 Medication  Instructions  Recorded  Confirmed  Last Taken  Type


 


NIFEdipine XL [Procardia Xl] 60 mg PO Q12HR 08/21/15 10/25/20 10/24/20 History


 


carvediloL [Coreg] 6.25 mg PO BID 08/21/15 10/25/20 10/24/20 History


 


Insulin Detemir (Nf) [Levemir 15 unit SQ QHS PRN 12/09/16 10/25/20 10/25/19 

History





Flextouch (Nf)]     


 


Insulin Lispro [HumaLOG VIAL] 0 units SQ AC PRN 12/09/16 10/25/20 10/25/19 

History


 


cloNIDine [Catapres] 0.2 mg PO QHS 12/09/16 10/25/20 10/24/20 History


 


Ibuprofen [Motrin 600 MG tab] 600 mg PO Q8H PRN #20 tablet 10/04/18 10/25/20 

Unknown Rx


 


Cholecalciferol (Vitamin D3) 3,000 unit PO DAILY 10/25/20 10/25/20 10/24/20 

History





[Vitamin D3 3,000 unit]     


 


Cinacalcet HCl [Sensipar] 60 mg PO DAILY 10/25/20 10/25/20 Unknown History


 


Gentamicin 0.1% Top Oint(Nf) 1 applic TP TID 10/25/20 10/25/20 Unknown History





[Gentamicin 0.1% Top Oint (Nf)]     


 


Acetaminophen [Acetaminophen TAB] 650 mg PO Q4H PRN  tablet 10/26/20  Unknown Rx


 


Cholecalciferol Vit D3 [Vitamin D3 3,000 unit PO DAILY  tablet 10/26/20  Unknown

 Rx





1,000 UNIT TAB]     


 


Cinacalcet [Sensipar] 60 mg PO QDAY  tablet 10/26/20  Unknown Rx


 


Insulin Glargine [Lantus VIAL] 22 units SUB-Q QHS PRN  units 10/26/20  Unknown 

Rx


 


Insulin Lispro [Humalog] 0 unit SUB-Q Q6H  vial 10/26/20  Unknown Rx


 


Sodium Chloride 0.9% Int [Sodium 10 ml IV PRN PRN  syringe 10/26/20  Unknown Rx





Chloride Flush Syringe 10 ml]     


 


risperiDONE [RisperDAL] 0.25 mg PO BID #60 tab 10/26/20  Unknown Rx


 


risperiDONE [RisperDAL] 0.25 mg PO BID #60 tablet 10/26/20  Unknown Rx

## 2020-11-19 ENCOUNTER — LAB OUTSIDE AN ENCOUNTER (OUTPATIENT)
Dept: URBAN - METROPOLITAN AREA CLINIC 52 | Facility: CLINIC | Age: 70
End: 2020-11-19

## 2020-11-19 ENCOUNTER — OFFICE VISIT (OUTPATIENT)
Dept: URBAN - METROPOLITAN AREA CLINIC 52 | Facility: CLINIC | Age: 70
End: 2020-11-19
Payer: MEDICARE

## 2020-11-19 ENCOUNTER — DASHBOARD ENCOUNTERS (OUTPATIENT)
Age: 70
End: 2020-11-19

## 2020-11-19 DIAGNOSIS — D12.6 TUBULOVILLOUS ADENOMA OF COLON: ICD-10-CM

## 2020-11-19 DIAGNOSIS — D64.9 ANEMIA: ICD-10-CM

## 2020-11-19 PROCEDURE — 99214 OFFICE O/P EST MOD 30 MIN: CPT | Performed by: INTERNAL MEDICINE

## 2020-11-19 PROCEDURE — G8420 CALC BMI NORM PARAMETERS: HCPCS | Performed by: INTERNAL MEDICINE

## 2020-11-19 PROCEDURE — G9903 PT SCRN TBCO ID AS NON USER: HCPCS | Performed by: INTERNAL MEDICINE

## 2020-11-19 PROCEDURE — 3017F COLORECTAL CA SCREEN DOC REV: CPT | Performed by: INTERNAL MEDICINE

## 2020-11-19 PROCEDURE — G8427 DOCREV CUR MEDS BY ELIG CLIN: HCPCS | Performed by: INTERNAL MEDICINE

## 2020-11-19 NOTE — HPI-TODAY'S VISIT:
Last month, hosp at [facility] for anemia Transfused 2 units. Anemic for ~ 5 years. BRB on TP. No melena or diarrhea. Colonoscopy 2014-4 cm TVA Polyp in ascending 2014-Colon surgery- resection 6 inches for this polyp [id number] ileocolonic. Diverticulosis + 4 mm polyp No HX ulcers or SOCO. Is on peritoneal dialysis. Done at night.

## 2020-11-19 NOTE — PHYSICAL EXAM GASTROINTESTINAL
Abdomen , soft, nontender, nondistended , no guarding or rigidity , no masses palpable , normal bowel sounds ,  dialysis site in LLQ Liver and Spleen , no hepatomegaly present , no hepatosplenomegaly , liver nontender , spleen not palpable

## 2020-12-21 ENCOUNTER — TELEPHONE ENCOUNTER (OUTPATIENT)
Dept: URBAN - METROPOLITAN AREA CLINIC 94 | Facility: CLINIC | Age: 70
End: 2020-12-21

## 2020-12-23 ENCOUNTER — OFFICE VISIT (OUTPATIENT)
Dept: URBAN - METROPOLITAN AREA SURGERY CENTER 17 | Facility: SURGERY CENTER | Age: 70
End: 2020-12-23

## 2021-01-08 ENCOUNTER — OFFICE VISIT (OUTPATIENT)
Dept: URBAN - METROPOLITAN AREA SURGERY CENTER 17 | Facility: SURGERY CENTER | Age: 71
End: 2021-01-08
Payer: MEDICARE

## 2021-01-08 DIAGNOSIS — K29.40 ATROPHIC GASTRITIS: ICD-10-CM

## 2021-01-08 DIAGNOSIS — D50.9 ANEMIA, IRON DEFICIENCY: ICD-10-CM

## 2021-01-08 PROCEDURE — 43239 EGD BIOPSY SINGLE/MULTIPLE: CPT | Performed by: INTERNAL MEDICINE

## 2021-01-08 PROCEDURE — G9937 DIG OR SURV COLSCO: HCPCS | Performed by: INTERNAL MEDICINE

## 2021-01-08 PROCEDURE — G8907 PT DOC NO EVENTS ON DISCHARG: HCPCS | Performed by: INTERNAL MEDICINE

## 2021-01-08 PROCEDURE — 45378 DIAGNOSTIC COLONOSCOPY: CPT | Performed by: INTERNAL MEDICINE

## 2021-05-10 ENCOUNTER — HOSPITAL ENCOUNTER (EMERGENCY)
Dept: HOSPITAL 5 - ED | Age: 71
Discharge: HOME | End: 2021-05-10
Payer: MEDICARE

## 2021-05-10 VITALS — DIASTOLIC BLOOD PRESSURE: 100 MMHG | SYSTOLIC BLOOD PRESSURE: 180 MMHG

## 2021-05-10 DIAGNOSIS — Z79.4: ICD-10-CM

## 2021-05-10 DIAGNOSIS — M19.91: ICD-10-CM

## 2021-05-10 DIAGNOSIS — Z88.8: ICD-10-CM

## 2021-05-10 DIAGNOSIS — Z91.013: ICD-10-CM

## 2021-05-10 DIAGNOSIS — R10.84: ICD-10-CM

## 2021-05-10 DIAGNOSIS — Z99.2: ICD-10-CM

## 2021-05-10 DIAGNOSIS — Z79.1: ICD-10-CM

## 2021-05-10 DIAGNOSIS — Z79.899: ICD-10-CM

## 2021-05-10 DIAGNOSIS — Z98.890: ICD-10-CM

## 2021-05-10 DIAGNOSIS — E11.22: Primary | ICD-10-CM

## 2021-05-10 DIAGNOSIS — R11.2: ICD-10-CM

## 2021-05-10 DIAGNOSIS — J45.909: ICD-10-CM

## 2021-05-10 DIAGNOSIS — I13.2: ICD-10-CM

## 2021-05-10 DIAGNOSIS — I50.9: ICD-10-CM

## 2021-05-10 DIAGNOSIS — N18.6: ICD-10-CM

## 2021-05-10 LAB
ALBUMIN SERPL-MCNC: 4 G/DL (ref 3.9–5)
ALT SERPL-CCNC: 7 UNITS/L (ref 7–56)
BASOPHILS # (AUTO): 0.1 K/MM3 (ref 0–0.1)
BASOPHILS NFR BLD AUTO: 0.5 % (ref 0–1.8)
BUN SERPL-MCNC: 52 MG/DL (ref 7–17)
BUN/CREAT SERPL: 5 %
CALCIUM SERPL-MCNC: 8.9 MG/DL (ref 8.4–10.2)
EOSINOPHIL # BLD AUTO: 0.1 K/MM3 (ref 0–0.4)
EOSINOPHIL NFR BLD AUTO: 0.7 % (ref 0–4.3)
HCT VFR BLD CALC: 37.3 % (ref 30.3–42.9)
HEMOLYSIS INDEX: 3
HGB BLD-MCNC: 11.7 GM/DL (ref 10.1–14.3)
LYMPHOCYTES # BLD AUTO: 0.7 K/MM3 (ref 1.2–5.4)
LYMPHOCYTES NFR BLD AUTO: 5.7 % (ref 13.4–35)
MCHC RBC AUTO-ENTMCNC: 31 % (ref 30–34)
MCV RBC AUTO: 95 FL (ref 79–97)
MONOCYTES # (AUTO): 0.8 K/MM3 (ref 0–0.8)
MONOCYTES % (AUTO): 6.7 % (ref 0–7.3)
PLATELET # BLD: 317 K/MM3 (ref 140–440)
RBC # BLD AUTO: 3.92 M/MM3 (ref 3.65–5.03)

## 2021-05-10 PROCEDURE — 36415 COLL VENOUS BLD VENIPUNCTURE: CPT

## 2021-05-10 PROCEDURE — 74176 CT ABD & PELVIS W/O CONTRAST: CPT

## 2021-05-10 PROCEDURE — 85025 COMPLETE CBC W/AUTO DIFF WBC: CPT

## 2021-05-10 PROCEDURE — 83735 ASSAY OF MAGNESIUM: CPT

## 2021-05-10 PROCEDURE — 80053 COMPREHEN METABOLIC PANEL: CPT

## 2021-05-10 PROCEDURE — 83690 ASSAY OF LIPASE: CPT

## 2021-05-10 NOTE — EMERGENCY DEPARTMENT REPORT
HPI





- General


Chief Complaint: Nausea/Vomiting/Diarrhea


Time Seen by Provider: 05/10/21 11:32





- HPI


HPI: 





This is a 70-year-old -American female who presents to the emergency 

department with complaint of a 2-day history of some nausea with vomiting and 

generalized abdominal discomfort.  She says it feels like a gas pain.  This 

started after the patient ate Mexican food with her daughter.  She has a past 

medical history of nightly peritoneal dialysis, CHF, diabetes, hypertension, 

dilated cardiomyopathy.  She denies any fever, diarrhea, dysuria, vaginal 

bleeding or discharge.  The patient does say that she has some episodes of 

diarrhea and believes that she saw a small amount of blood in her stool that was

red.  She says "I think it is from hemorrhoids."  She tried some Pepcid and 

Pepto-Bismol for her symptoms with transient relief.  Her primary care physician

is Dr. Vazquez and her nephrologist is Dr. Horton.  No recent travel or sick co

ntacts at home.  No known aggravating or alleviating factors.





ED Past Medical Hx





- Past Medical History


Previous Medical History?: Yes


Hx Hypertension: Yes (FOR 10+ YRS, DR. ABAD- PCP)


Hx Congestive Heart Failure: Yes (IN 2007)


Hx Diabetes: Yes


Hx Liver Disease: No


Hx Renal Disease: Yes (CKD STAGE 5, DR. HORTON- NEPHROLOGIST)


Hx Sickle Cell Disease: No


Hx Arthritis: Yes (Gout)


Hx Seizures: No


Hx Asthma: Yes (AS A CHILD)


Hx HIV: No


Additional medical history: Dilated cardiomyopathy / PERITONEAL DYALISIS





- Surgical History


Past Surgical History?: Yes


Hx Pacemaker: No


Hx Appendectomy: Yes (IN 1985)


Additional Surgical History: tonsil removed





- Social History


Smoking Status: Never Smoker





- Medications


Home Medications: 


                                Home Medications











 Medication  Instructions  Recorded  Confirmed  Last Taken  Type


 


NIFEdipine XL [Procardia Xl] 60 mg PO Q12HR 08/21/15 10/25/20 10/24/20 History


 


carvediloL [Coreg] 6.25 mg PO BID 08/21/15 10/25/20 10/24/20 History


 


Insulin Detemir (Nf) [Levemir 15 unit SQ QHS PRN 12/09/16 10/25/20 10/25/19 

History





Flextouch (Nf)]     


 


Insulin Lispro [HumaLOG VIAL] 0 units SQ AC PRN 12/09/16 10/25/20 10/25/19 

History


 


cloNIDine [Catapres] 0.2 mg PO QHS 12/09/16 10/25/20 10/24/20 History


 


Ibuprofen [Motrin 600 MG tab] 600 mg PO Q8H PRN #20 tablet 10/04/18 10/25/20 

Unknown Rx


 


Cholecalciferol (Vitamin D3) 3,000 unit PO DAILY 10/25/20 10/25/20 10/24/20 

History





[Vitamin D3 3,000 unit]     


 


Cinacalcet HCl [Sensipar] 60 mg PO DAILY 10/25/20 10/25/20 Unknown History


 


Gentamicin 0.1% Top Oint(Nf) 1 applic TP TID 10/25/20 10/25/20 Unknown History





[Gentamicin 0.1% Top Oint (Nf)]     


 


Acetaminophen [Acetaminophen TAB] 650 mg PO Q4H PRN  tablet 10/26/20  Unknown Rx


 


Cholecalciferol Vit D3 [Vitamin D3 3,000 unit PO DAILY  tablet 10/26/20  Unknown

 Rx





1,000 UNIT TAB]     


 


Cinacalcet [Sensipar] 60 mg PO QDAY  tablet 10/26/20  Unknown Rx


 


Insulin Glargine [Lantus VIAL] 22 units SUB-Q QHS PRN  units 10/26/20  Unknown 

Rx


 


Insulin Lispro [Humalog] 0 unit SUB-Q Q6H  vial 10/26/20  Unknown Rx


 


Sodium Chloride 0.9% Int [Sodium 10 ml IV PRN PRN  syringe 10/26/20  Unknown Rx





Chloride Flush Syringe 10 ml]     


 


risperiDONE [RisperDAL] 0.25 mg PO BID #60 tab 10/26/20  Unknown Rx


 


risperiDONE [RisperDAL] 0.25 mg PO BID #60 tablet 10/26/20  Unknown Rx














ED Review of Systems


ROS: 


Stated complaint: POSSIBLE FOOD POSIONING


Other details as noted in HPI





Comment: All other systems reviewed and negative


Constitutional: denies: chills, fever


Eyes: denies: eye pain, vision change


ENT: denies: ear pain, throat pain


Respiratory: denies: cough, shortness of breath


Cardiovascular: denies: chest pain, palpitations


Gastrointestinal: abdominal pain, nausea, vomiting, diarrhea


Genitourinary: denies: dysuria, discharge


Musculoskeletal: denies: back pain, arthralgia


Skin: denies: rash, lesions


Neurological: denies: headache, weakness





Physical Exam





- Physical Exam


Vital Signs: 


                                   Vital Signs











  05/10/21 05/10/21





  08:01 12:08


 


Temperature 99.1 F 


 


Pulse Rate 103 H 


 


Respiratory 16 18





Rate  


 


Blood Pressure 192/115 





[Right]  


 


O2 Sat by Pulse 96 





Oximetry  











Physical Exam: 





GENERAL: The patient is well-developed well-nourished.


HENT: Normocephalic.  Atraumatic.    Patient has moist mucous membranes.


EYES: Extraocular motions are intact. 


NECK: Supple. Trachea is midline.


CHEST/LUNGS: Clear to auscultation.  There is no respiratory distress noted.


HEART/CARDIOVASCULAR: Regular.  There is no tachycardia.  There is no murmur.


ABDOMEN: Abdomen is soft.  Mild generalized abdominal tenderness to palpation.  

No guarding.  No peritoneal signs with heel strike.  Patient has normal bowel 

sounds.  There is no abdominal distention.


SKIN: Skin is warm and dry. 


NEURO: The patient is awake, alert, and oriented.  The patient is cooperative.  

The patient has no focal neurologic deficits.  Normal speech.


MUSCULOSKELETAL: There is no tenderness or deformity.  There is no limitation 

range of motion. 


BACK: No CVA tenderness to palpation.





ED Course


                                   Vital Signs











  05/10/21 05/10/21





  08:01 12:08


 


Temperature 99.1 F 


 


Pulse Rate 103 H 


 


Respiratory 16 18





Rate  


 


Blood Pressure 192/115 





[Right]  


 


O2 Sat by Pulse 96 





Oximetry  














ED Medical Decision Making





- Lab Data


Result diagrams: 


                                 05/10/21 09:29





                                 05/10/21 09:29





                                   Lab Results











  05/10/21 05/10/21 05/10/21 Range/Units





  09:29 09:29 10:10 


 


WBC  11.9 H    (4.5-11.0)  K/mm3


 


RBC  3.92    (3.65-5.03)  M/mm3


 


Hgb  11.7    (10.1-14.3)  gm/dl


 


Hct  37.3    (30.3-42.9)  %


 


MCV  95    (79-97)  fl


 


MCH  30    (28-32)  pg


 


MCHC  31    (30-34)  %


 


RDW  18.0 H    (13.2-15.2)  %


 


Plt Count  317    (140-440)  K/mm3


 


Lymph % (Auto)  5.7 L    (13.4-35.0)  %


 


Mono % (Auto)  6.7    (0.0-7.3)  %


 


Eos % (Auto)  0.7    (0.0-4.3)  %


 


Baso % (Auto)  0.5    (0.0-1.8)  %


 


Lymph # (Auto)  0.7 L    (1.2-5.4)  K/mm3


 


Mono # (Auto)  0.8    (0.0-0.8)  K/mm3


 


Eos # (Auto)  0.1    (0.0-0.4)  K/mm3


 


Baso # (Auto)  0.1    (0.0-0.1)  K/mm3


 


Seg Neutrophils %  86.4 H    (40.0-70.0)  %


 


Seg Neutrophils #  10.2 H    (1.8-7.7)  K/mm3


 


Sodium   142   (137-145)  mmol/L


 


Potassium   4.2   (3.6-5.0)  mmol/L


 


Chloride   90.2 L   ()  mmol/L


 


Carbon Dioxide   36 H   (22-30)  mmol/L


 


Anion Gap   20   mmol/L


 


BUN   52 H   (7-17)  mg/dL


 


Creatinine   10.9 H   (0.6-1.2)  mg/dL


 


Estimated GFR   4   ml/min


 


BUN/Creatinine Ratio   5   %


 


Glucose   94   ()  mg/dL


 


Calcium   8.9   (8.4-10.2)  mg/dL


 


Magnesium    1.70  (1.7-2.3)  mg/dL


 


Total Bilirubin   0.50   (0.1-1.2)  mg/dL


 


AST   10   (5-40)  units/L


 


ALT   7   (7-56)  units/L


 


Alkaline Phosphatase   174 H   ()  units/L


 


Total Protein   8.6 H   (6.3-8.2)  g/dL


 


Albumin   4.0   (3.9-5)  g/dL


 


Albumin/Globulin Ratio   0.9   %


 


Lipase   22   (13-60)  units/L














- Radiology Data


Radiology results: report reviewed





CT ABDOMEN AND PELVIS WITHOUT CONTRAST INDICATION / CLINICAL INFORMATION: Abd 

pain. TECHNIQUE: Axial CT images were obtained through the abdomen and pelvis 

without IV contrast. All CT scans at this location are performed using CT dose 

reduction for ALARA by means of automated exposure control. COMPARISON: CT of 

the chest from 5/18/2017. FINDINGS: LOWER CHEST: Subpleural reticular scarring 

or early fibrosis. No acute findings. LIVER: No significant abnormality 

GALLBLADDER/BILIARY TREE: No significant abnormality PANCREAS: No significant 

abnormality SPLEEN: No significant abnormality ADRENALS: No significant 

abnormality KIDNEYS / URETER: Both kidneys are atrophic. There are bilateral 

renal cysts, to include peripherally calcified lower pole right renal cyst. This

 is unchanged in size from 2017 CT, measuring 1.7 cm. No urolithiasis or 

hydronephrosis. URINARY BLADDER: Bladder is decompressed, limiting further 

evaluation. REPRODUCTIVE ORGANS: No significant abnormality STOMACH / SMALL 

BOWEL: Stomach and small bowel are normal in caliber. No evidence of bowel 

inflammation. COLON: Postoperative changes of partial right hemicolectomy. 

Scattered colonic diverticula without CT evidence diverticulitis. LYMPH NODES: 

No significant adenopathy. VASCULATURE: Moderate atherosclerotic calcification 

without acute abnormality. OTHER: Peritoneal dialysis catheter with trace free 

fluid in the pelvis. No intraperitoneal free air. No organized collection is 

identified. SKELETAL SYSTEM: Mild diffuse bony sclerosis, most likely related to

 renal osteodystrophy. There is asymmetric resorption and pseudoenlargement of 

the right greater than left SI joints, most compatible with secondary 

hyperparathyroidism in the setting of renal osteodystrophy. IMPRESSION: 1. No 

acute abnormality of the abdomen or pelvis. 2. Bony resorption of the SI joints,

 greater on the right, most consistent with renal osteodystrophy/secondary 

hyperparathyroidism. 3. Other stable chronic and postoperative findings as 

above. 





- Medical Decision Making





This patient presents with a 2-day history of abdominal pain that she says feels

 like gas pains, as well as some nausea with one episode of vomiting.  The 

patient thinks it may be related to eating Mexican food Saturday afternoon.  

There is some very mild generalized abdominal tenderness to palpation.  However 

the abdomen is soft, nondistended and nontoxic in appearance.





Labs have been mostly unremarkable including CBC, metabolic panel, lipase, 

except for renal insufficiency consistent with her end-stage renal disease on 

nightly peritoneal dialysis.  The peritoneal dialysis catheter is seen and does 

not appear to have any signs of infection without any surrounding erythema or 

purulent discharge.





CT scan of the abdomen and pelvis without contrast does not show any acute 

process in the abdomen or pelvis.  Patient was given a dose of pain medication 

and upon reevaluation says that she is feeling greatly improved.  There has been

 no vomiting while in the emergency department and the patient was able to pass 

an oral challenge.





Patient has some hypertension, but otherwise her vital signs have been 

reassuring including being afebrile.  She has good outpatient follow-up with 

primary care and nephrology.  She has been instructed to continue with her 

nightly peritoneal dialysis.  She will return to the emergency department with 

any worsening of her symptoms or with any acute distress.


Critical Care Time: No


Critical care attestation.: 


If time is entered above; I have spent that time in minutes in the direct care 

of this critically ill patient, excluding procedure time.








ED Disposition


Clinical Impression: 


 End-stage renal disease on peritoneal dialysis





Abdominal pain


Qualifiers:


 Abdominal location: generalized Qualified Code(s): R10.84 - Generalized 

abdominal pain





Nausea & vomiting


Qualifiers:


 Vomiting type: unspecified Vomiting Intractability: non-intractable Qualified 

Code(s): R11.2 - Nausea with vomiting, unspecified





Hypertension


Qualifiers:


 Hypertension type: renovascular hypertension Qualified Code(s): I15.0 - 

Renovascular hypertension





Disposition: DC-01 TO HOME OR SELFCARE


Is pt being admited?: No


Condition: Stable


Instructions:  Abdominal Pain, Adult, Nausea and Vomiting, Adult, Hypertension, 

Adult, Hypertension (ED)


Additional Instructions: 


Please follow-up with your primary care physician in the next few days.  

Continue with your peritoneal dialysis.  Take all medications as prescribed.  

Try to stay away from foods that are high in salt and caffeinated products.  

Keep a blood pressure log.





Return to the emergency department with any worsening of your symptoms, new or 

concerning symptoms not addressed during this current emergency department visi

t, or with any acute distress.


Referrals: 


KIARRA ABAD MD [Primary Care Provider] - 2-3 Days


PARDEEP HORTON MD [Staff Physician] - 2-3 Days


Time of Disposition: 13:38

## 2021-05-10 NOTE — EVENT NOTE
ED Screening Note


Date of service: 05/10/21


Time: 10:11


ED Screening Note: 


Pt presents to ED with c/o abd pain, vomiting, and diarrhea since saturday; 

Bloody mucous stools today; fever of 101 yesterday


PMHX include ESRD on peritoneal dialysis, HTN, and DM, 





This initial assessment/diagnostic orders/clinical plan/treatment(s) is/are 

subject to change based on patients health status, clinical progression and re-

assessment by fellow clinical providers in the ED. Further treatment and workup 

at subsequent clinical providers discretion. Patient/guardian urged not to elope

from the ED as their condition may be serious if not clinically assessed and 

managed. 





Initial orders include: 


Abd pain order set including CT abd pelvis

## 2021-05-10 NOTE — CAT SCAN REPORT
CT ABDOMEN AND PELVIS WITHOUT CONTRAST



INDICATION / CLINICAL INFORMATION: Abd pain.



TECHNIQUE: Axial CT images were obtained through the abdomen and pelvis without IV contrast.  All CT 
scans at this location are performed using CT dose reduction for ALARA by means of automated exposure
 control. 



COMPARISON: CT of the chest from 5/18/2017.



FINDINGS:



LOWER CHEST: Subpleural reticular scarring or early fibrosis. No acute findings.

LIVER: No significant abnormality

GALLBLADDER/BILIARY TREE: No significant abnormality  

PANCREAS: No significant abnormality

SPLEEN: No significant abnormality

ADRENALS: No significant abnormality

KIDNEYS / URETER: Both kidneys are atrophic. There are bilateral renal cysts, to include peripherally
 calcified lower pole right renal cyst. This is unchanged in size from 2017 CT, measuring 1.7 cm. No 
urolithiasis or hydronephrosis.

URINARY BLADDER: Bladder is decompressed, limiting further evaluation.

REPRODUCTIVE ORGANS: No significant abnormality

STOMACH / SMALL BOWEL: Stomach and small bowel are normal in caliber. No evidence of bowel inflammati
on. 

COLON: Postoperative changes of partial right hemicolectomy. Scattered colonic diverticula without CT
 evidence diverticulitis.  

LYMPH NODES: No significant adenopathy.

VASCULATURE: Moderate atherosclerotic calcification without acute abnormality. 

OTHER: Peritoneal dialysis catheter with trace free fluid in the pelvis. No intraperitoneal free air.
 No organized collection is identified.



SKELETAL SYSTEM: Mild diffuse bony sclerosis, most likely related to renal osteodystrophy. There is a
symmetric resorption and pseudoenlargement of the right greater than left SI joints, most compatible 
with secondary hyperparathyroidism in the setting of renal osteodystrophy.



IMPRESSION:



1. No acute abnormality of the abdomen or pelvis.

2. Bony resorption of the SI joints, greater on the right, most consistent with renal osteodystrophy/
secondary hyperparathyroidism.

3. Other stable chronic and postoperative findings as above.



Signer Name: Servando Doyle MD 

Signed: 5/10/2021 1:24 PM

Workstation Name: [id number]

## 2021-05-11 ENCOUNTER — HOSPITAL ENCOUNTER (INPATIENT)
Dept: HOSPITAL 5 - ED | Age: 71
LOS: 9 days | Discharge: HOME | DRG: 871 | End: 2021-05-20
Attending: INTERNAL MEDICINE | Admitting: INTERNAL MEDICINE
Payer: MEDICARE

## 2021-05-11 DIAGNOSIS — M19.90: ICD-10-CM

## 2021-05-11 DIAGNOSIS — J96.01: ICD-10-CM

## 2021-05-11 DIAGNOSIS — Z90.49: ICD-10-CM

## 2021-05-11 DIAGNOSIS — N18.6: ICD-10-CM

## 2021-05-11 DIAGNOSIS — Z79.899: ICD-10-CM

## 2021-05-11 DIAGNOSIS — E87.6: ICD-10-CM

## 2021-05-11 DIAGNOSIS — Z99.2: ICD-10-CM

## 2021-05-11 DIAGNOSIS — N25.81: ICD-10-CM

## 2021-05-11 DIAGNOSIS — E83.39: ICD-10-CM

## 2021-05-11 DIAGNOSIS — A41.9: Primary | ICD-10-CM

## 2021-05-11 DIAGNOSIS — Z79.4: ICD-10-CM

## 2021-05-11 DIAGNOSIS — E11.649: ICD-10-CM

## 2021-05-11 DIAGNOSIS — T82.7XXA: ICD-10-CM

## 2021-05-11 DIAGNOSIS — I13.2: ICD-10-CM

## 2021-05-11 DIAGNOSIS — D63.1: ICD-10-CM

## 2021-05-11 DIAGNOSIS — K65.9: ICD-10-CM

## 2021-05-11 DIAGNOSIS — Z88.8: ICD-10-CM

## 2021-05-11 DIAGNOSIS — I50.9: ICD-10-CM

## 2021-05-11 DIAGNOSIS — J91.8: ICD-10-CM

## 2021-05-11 DIAGNOSIS — I21.A1: ICD-10-CM

## 2021-05-11 DIAGNOSIS — E11.22: ICD-10-CM
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
## 2021-05-11 DIAGNOSIS — Z91.013: ICD-10-CM

## 2021-05-11 DIAGNOSIS — M10.9: ICD-10-CM

## 2021-05-11 DIAGNOSIS — I42.0: ICD-10-CM

## 2021-05-11 LAB
ALBUMIN SERPL-MCNC: 3.5 G/DL (ref 3.9–5)
ALT SERPL-CCNC: 5 UNITS/L (ref 7–56)
APTT BLD: 27.8 SEC. (ref 24.2–36.6)
BASOPHILS # (AUTO): 0 K/MM3 (ref 0–0.1)
BASOPHILS NFR BLD AUTO: 0.5 % (ref 0–1.8)
BUN SERPL-MCNC: 54 MG/DL (ref 7–17)
BUN/CREAT SERPL: 5 %
CALCIUM SERPL-MCNC: 8.3 MG/DL (ref 8.4–10.2)
EOSINOPHIL # BLD AUTO: 0.2 K/MM3 (ref 0–0.4)
EOSINOPHIL NFR BLD AUTO: 2.2 % (ref 0–4.3)
HCT VFR BLD CALC: 32.9 % (ref 30.3–42.9)
HDLC SERPL-MCNC: 45 MG/DL (ref 40–59)
HEMOLYSIS INDEX: 6
HGB BLD-MCNC: 10.7 GM/DL (ref 10.1–14.3)
INR PPP: 1.05 (ref 0.87–1.13)
LYMPHOCYTES # BLD AUTO: 0.8 K/MM3 (ref 1.2–5.4)
LYMPHOCYTES NFR BLD AUTO: 9.4 % (ref 13.4–35)
MCHC RBC AUTO-ENTMCNC: 33 % (ref 30–34)
MCV RBC AUTO: 94 FL (ref 79–97)
MONOCYTES # (AUTO): 0.6 K/MM3 (ref 0–0.8)
MONOCYTES % (AUTO): 6.7 % (ref 0–7.3)
PLATELET # BLD: 311 K/MM3 (ref 140–440)
RBC # BLD AUTO: 3.48 M/MM3 (ref 3.65–5.03)

## 2021-05-11 PROCEDURE — 82962 GLUCOSE BLOOD TEST: CPT

## 2021-05-11 PROCEDURE — 80202 ASSAY OF VANCOMYCIN: CPT

## 2021-05-11 PROCEDURE — 93005 ELECTROCARDIOGRAM TRACING: CPT

## 2021-05-11 PROCEDURE — 83690 ASSAY OF LIPASE: CPT

## 2021-05-11 PROCEDURE — 85520 HEPARIN ASSAY: CPT

## 2021-05-11 PROCEDURE — 85730 THROMBOPLASTIN TIME PARTIAL: CPT

## 2021-05-11 PROCEDURE — 71046 X-RAY EXAM CHEST 2 VIEWS: CPT

## 2021-05-11 PROCEDURE — 93306 TTE W/DOPPLER COMPLETE: CPT

## 2021-05-11 PROCEDURE — 82140 ASSAY OF AMMONIA: CPT

## 2021-05-11 PROCEDURE — 85014 HEMATOCRIT: CPT

## 2021-05-11 PROCEDURE — 80061 LIPID PANEL: CPT

## 2021-05-11 PROCEDURE — 85610 PROTHROMBIN TIME: CPT

## 2021-05-11 PROCEDURE — 74176 CT ABD & PELVIS W/O CONTRAST: CPT

## 2021-05-11 PROCEDURE — 85027 COMPLETE CBC AUTOMATED: CPT

## 2021-05-11 PROCEDURE — 85018 HEMOGLOBIN: CPT

## 2021-05-11 PROCEDURE — 71045 X-RAY EXAM CHEST 1 VIEW: CPT

## 2021-05-11 PROCEDURE — 82805 BLOOD GASES W/O2 SATURATION: CPT

## 2021-05-11 PROCEDURE — 85025 COMPLETE CBC W/AUTO DIFF WBC: CPT

## 2021-05-11 PROCEDURE — 82330 ASSAY OF CALCIUM: CPT

## 2021-05-11 PROCEDURE — G0378 HOSPITAL OBSERVATION PER HR: HCPCS

## 2021-05-11 PROCEDURE — 87116 MYCOBACTERIA CULTURE: CPT

## 2021-05-11 PROCEDURE — 84484 ASSAY OF TROPONIN QUANT: CPT

## 2021-05-11 PROCEDURE — 85049 AUTOMATED PLATELET COUNT: CPT

## 2021-05-11 PROCEDURE — 80053 COMPREHEN METABOLIC PANEL: CPT

## 2021-05-11 PROCEDURE — 74019 RADEX ABDOMEN 2 VIEWS: CPT

## 2021-05-11 PROCEDURE — 83735 ASSAY OF MAGNESIUM: CPT

## 2021-05-11 PROCEDURE — 84132 ASSAY OF SERUM POTASSIUM: CPT

## 2021-05-11 PROCEDURE — 76604 US EXAM CHEST: CPT

## 2021-05-11 PROCEDURE — 36415 COLL VENOUS BLD VENIPUNCTURE: CPT

## 2021-05-11 PROCEDURE — 87040 BLOOD CULTURE FOR BACTERIA: CPT

## 2021-05-11 PROCEDURE — 89051 BODY FLUID CELL COUNT: CPT

## 2021-05-11 PROCEDURE — 85007 BL SMEAR W/DIFF WBC COUNT: CPT

## 2021-05-11 PROCEDURE — C9113 INJ PANTOPRAZOLE SODIUM, VIA: HCPCS

## 2021-05-11 PROCEDURE — 83880 ASSAY OF NATRIURETIC PEPTIDE: CPT

## 2021-05-11 PROCEDURE — 80048 BASIC METABOLIC PNL TOTAL CA: CPT

## 2021-05-11 NOTE — XRAY REPORT
CHEST PA AND LATERAL VIEWS



INDICATION: 

chest pain.



COMPARISON: 

10/25/2020



FINDINGS:



Support devices: None.

Heart: Within normal limits. 

Lungs/Pleura: No acute pulmonary or pleural findings.  







IMPRESSION:

1. No acute findings.



Signer Name: Darren Rose MD 

Signed: 5/11/2021 6:55 PM

Workstation Name: [id number]

## 2021-05-11 NOTE — EMERGENCY DEPARTMENT REPORT
ED Chest Pain HPI





- General


Chief Complaint: Chest Pain


Stated Complaint: CHEST PAIN


Time Seen by Provider: 05/11/21 21:29


Source: patient


Mode of arrival: Wheelchair


Limitations: No Limitations





- History of Present Illness


Initial Comments: 





This is a 70-year-old -American female who presents to the emergency 

department with complaint of abdominal pain and midsternal chest pain.  I saw 

this patient here in this emergency department yesterday with the complaint of 

abdominal pain and nausea with one episode of vomiting that had been going on fo

r 2 days since the patient ate some Mexican food on Saturday.  Yesterday her 

labs were unremarkable except for the renal insufficiency consistent with her 

end-stage renal disease on peritoneal dialysis.  Also, she had a negative CT 

scan of the abdomen and pelvis without contrast.  Patient says that she had some

improvement prior to discharge yesterday.  However, the patient says that her 

abdominal pain came back earlier today and began radiating up into the chest.  

The patient did have her peritoneal dialysis last night but has not yet had it 

tonight.  She follows with Dr. Benavides for nephrology.  She has not taken anything

for her chest pain prior to presentation.  She says that it is a 7 out of 10 in 

intensity.  No known aggravating or alleviating factors.  No recent travel or 

sick contacts at home.  On top of the end-stage renal disease, the patient also 

has a history of hypertension and CHF.


Severity scale (0 -10): 7





- Related Data


                                Home Medications











 Medication  Instructions  Recorded  Confirmed  Last Taken


 


NIFEdipine XL [Procardia Xl] 60 mg PO Q12HR 08/21/15 10/25/20 10/24/20


 


carvediloL [Coreg] 6.25 mg PO BID 08/21/15 10/25/20 10/24/20


 


Insulin Detemir (Nf) [Levemir 15 unit SQ QHS PRN 12/09/16 10/25/20 10/25/19





Flextouch (Nf)]    


 


Insulin Lispro [HumaLOG VIAL] 0 units SQ AC PRN 12/09/16 10/25/20 10/25/19


 


cloNIDine [Catapres] 0.2 mg PO QHS 12/09/16 10/25/20 10/24/20


 


Cholecalciferol (Vitamin D3) 3,000 unit PO DAILY 10/25/20 10/25/20 10/24/20





[Vitamin D3 3,000 unit]    


 


Cinacalcet HCl [Sensipar] 60 mg PO DAILY 10/25/20 10/25/20 Unknown


 


Gentamicin 0.1% Top Oint(Nf) 1 applic TP TID 10/25/20 10/25/20 Unknown





[Gentamicin 0.1% Top Oint (Nf)]    








                                  Previous Rx's











 Medication  Instructions  Recorded  Last Taken  Type


 


Ibuprofen [Motrin 600 MG tab] 600 mg PO Q8H PRN #20 tablet 10/04/18 Unknown Rx


 


Acetaminophen [Acetaminophen TAB] 650 mg PO Q4H PRN  tablet 10/26/20 Unknown Rx


 


Cholecalciferol Vit D3 [Vitamin D3 3,000 unit PO DAILY  tablet 10/26/20 Unknown 

Rx





1,000 UNIT TAB]    


 


Cinacalcet [Sensipar] 60 mg PO QDAY  tablet 10/26/20 Unknown Rx


 


Insulin Glargine [Lantus VIAL] 22 units SUB-Q QHS PRN  units 10/26/20 Unknown Rx


 


Insulin Lispro [Humalog] 0 unit SUB-Q Q6H  vial 10/26/20 Unknown Rx


 


Sodium Chloride 0.9% Int [Sodium 10 ml IV PRN PRN  syringe 10/26/20 Unknown Rx





Chloride Flush Syringe 10 ml]    


 


risperiDONE [RisperDAL] 0.25 mg PO BID #60 tab 10/26/20 Unknown Rx


 


risperiDONE [RisperDAL] 0.25 mg PO BID #60 tablet 10/26/20 Unknown Rx











                                    Allergies











Allergy/AdvReac Type Severity Reaction Status Date / Time


 


digoxin Allergy  Hives Verified 05/11/21 18:22


 


lisinopril Allergy  Swelling Verified 05/11/21 18:22


 


nitroglycerin AdvReac  Rash, Verified 05/11/21 18:22





   HEADACHES  


 


shellfish derived AdvReac  Swelling Verified 05/11/21 18:22














Heart Score





- HEART Score


History: Slightly suspicious


EKG: Normal


Age: > 65


Risk factors: 1-2 risk factors


Troponin: > 3x normal limit


HEART Score: 5





- EKG Read Time


Time EKG Completed: 18:13


EKG Read Time: 18:14





- Critical Actions


Critical Actions: 4-6 pts:12-16.6% risk of adverse cardiac event. Should be 

admitted





ED Review of Systems


ROS: 


Stated complaint: CHEST PAIN


Other details as noted in HPI





Comment: All other systems reviewed and negative


Constitutional: denies: chills, fever


Eyes: denies: eye pain, vision change


ENT: denies: ear pain, throat pain


Respiratory: denies: cough, shortness of breath


Cardiovascular: chest pain.  denies: palpitations


Gastrointestinal: abdominal pain, nausea.  denies: vomiting


Genitourinary: denies: dysuria, discharge


Musculoskeletal: denies: back pain, arthralgia


Skin: denies: rash, lesions


Neurological: denies: headache, weakness





ED Past Medical Hx





- Past Medical History


Hx Hypertension: Yes (FOR 10+ YRS, DR. ABAD- PCP)


Hx Congestive Heart Failure: Yes (IN 2007)


Hx Diabetes: Yes


Hx Liver Disease: No


Hx Renal Disease: Yes (CKD STAGE 5, DR. GUTIERREZ- NEPHROLOGIST)


Hx Sickle Cell Disease: No


Hx Arthritis: Yes (Gout)


Hx Seizures: No


Hx Asthma: Yes (AS A CHILD)


Hx HIV: No


Additional medical history: Dilated cardiomyopathy / PERITONEAL DYALISIS





- Surgical History


Hx Pacemaker: No


Hx Appendectomy: Yes (IN 1985)


Additional Surgical History: tonsil removed





- Social History


Smoking Status: Never Smoker


Substance Use Type: None





- Medications


Home Medications: 


                                Home Medications











 Medication  Instructions  Recorded  Confirmed  Last Taken  Type


 


NIFEdipine XL [Procardia Xl] 60 mg PO Q12HR 08/21/15 10/25/20 10/24/20 History


 


carvediloL [Coreg] 6.25 mg PO BID 08/21/15 10/25/20 10/24/20 History


 


Insulin Detemir (Nf) [Levemir 15 unit SQ QHS PRN 12/09/16 10/25/20 10/25/19 

History





Flextouch (Nf)]     


 


Insulin Lispro [HumaLOG VIAL] 0 units SQ AC PRN 12/09/16 10/25/20 10/25/19 

History


 


cloNIDine [Catapres] 0.2 mg PO QHS 12/09/16 10/25/20 10/24/20 History


 


Ibuprofen [Motrin 600 MG tab] 600 mg PO Q8H PRN #20 tablet 10/04/18 10/25/20 

Unknown Rx


 


Cholecalciferol (Vitamin D3) 3,000 unit PO DAILY 10/25/20 10/25/20 10/24/20 

History





[Vitamin D3 3,000 unit]     


 


Cinacalcet HCl [Sensipar] 60 mg PO DAILY 10/25/20 10/25/20 Unknown History


 


Gentamicin 0.1% Top Oint(Nf) 1 applic TP TID 10/25/20 10/25/20 Unknown History





[Gentamicin 0.1% Top Oint (Nf)]     


 


Acetaminophen [Acetaminophen TAB] 650 mg PO Q4H PRN  tablet 10/26/20  Unknown Rx


 


Cholecalciferol Vit D3 [Vitamin D3 3,000 unit PO DAILY  tablet 10/26/20  Unknown

 Rx





1,000 UNIT TAB]     


 


Cinacalcet [Sensipar] 60 mg PO QDAY  tablet 10/26/20  Unknown Rx


 


Insulin Glargine [Lantus VIAL] 22 units SUB-Q QHS PRN  units 10/26/20  Unknown 

Rx


 


Insulin Lispro [Humalog] 0 unit SUB-Q Q6H  vial 10/26/20  Unknown Rx


 


Sodium Chloride 0.9% Int [Sodium 10 ml IV PRN PRN  syringe 10/26/20  Unknown Rx





Chloride Flush Syringe 10 ml]     


 


risperiDONE [RisperDAL] 0.25 mg PO BID #60 tab 10/26/20  Unknown Rx


 


risperiDONE [RisperDAL] 0.25 mg PO BID #60 tablet 10/26/20  Unknown Rx














ED Physical Exam





- General


Limitations: No Limitations





- Other


Other exam information: 





GENERAL: The patient is well-developed well-nourished.


HENT: Normocephalic.  Atraumatic.    Patient has moist mucous membranes.


EYES: Extraocular motions are intact.


NECK: Supple. Trachea is midline.


CHEST/LUNGS: Clear to auscultation.  There is no respiratory distress noted.


HEART/CARDIOVASCULAR: Regular.  There is mild tachycardia.  There is no murmur.


ABDOMEN: Abdomen is soft.  Mild abdominal tenderness to palpation.  No guarding.

  Peritoneal dialysis catheter in place.  Patient has normal bowel sounds.  


SKIN: Skin is warm and dry. 


NEURO: The patient is awake, alert, and oriented.  The patient is cooperative.  

The patient has no focal neurologic deficits.  Normal speech.


MUSCULOSKELETAL: There is no tenderness or deformity.  There is no limitation 

range of motion.  





ED Course


                                   Vital Signs











  05/11/21 05/11/21 05/11/21





  18:20 21:20 21:30


 


Temperature 98.7 F  


 


Pulse Rate 104 H 110 H 113 H


 


Respiratory 22 27 H 25 H





Rate   


 


Blood Pressure 176/97  192/84


 


O2 Sat by Pulse 100 99 





Oximetry   














  05/11/21 05/11/21 05/11/21





  21:46 22:00 22:30


 


Temperature   


 


Pulse Rate 107 H  107 H


 


Respiratory  19 24





Rate   


 


Blood Pressure  178/101 


 


O2 Sat by Pulse 95 96 95





Oximetry   














  05/11/21 05/11/21





  23:00 23:30


 


Temperature  


 


Pulse Rate 107 H 115 H


 


Respiratory 25 H 27 H





Rate  


 


Blood Pressure 180/86 160/80


 


O2 Sat by Pulse 95 90





Oximetry  














- Consultations


Consultation #1: 





05/11/21 22:24


I spoke to Dr. Moreira to notify them of the patient's presentation to the 

emergency department and the plan for admission.  The patient does not appear to

 require any emergent dialysis this evening so they will consult on this 

patient.





BELL score





- Bell Score


Age > 65: (1) Yes


Aspirin use within the Past 7 Days: (0) No


3 or more CAD Risk Factors: (0) No


2 or more Angina events in past 24 hrs: (1) Yes


Known CAD with more than 50% Stenosis: (0) No


Elevated Cardiac Markers: (1) Yes


ST Deviation Greater than 0.5mm: (0) No


BELL Score: 3





ED Medical Decision Making





- Lab Data


Result diagrams: 


                                 05/11/21 18:35





                                 05/11/21 18:35








                                   Lab Results











  05/11/21 05/11/21 05/11/21 Range/Units





  18:35 18:35 18:35 


 


WBC  8.4    (4.5-11.0)  K/mm3


 


RBC  3.48 L    (3.65-5.03)  M/mm3


 


Hgb  10.7    (10.1-14.3)  gm/dl


 


Hct  32.9    (30.3-42.9)  %


 


MCV  94    (79-97)  fl


 


MCH  31    (28-32)  pg


 


MCHC  33    (30-34)  %


 


RDW  17.7 H    (13.2-15.2)  %


 


Plt Count  311    (140-440)  K/mm3


 


Lymph % (Auto)  9.4 L    (13.4-35.0)  %


 


Mono % (Auto)  6.7    (0.0-7.3)  %


 


Eos % (Auto)  2.2    (0.0-4.3)  %


 


Baso % (Auto)  0.5    (0.0-1.8)  %


 


Lymph # (Auto)  0.8 L    (1.2-5.4)  K/mm3


 


Mono # (Auto)  0.6    (0.0-0.8)  K/mm3


 


Eos # (Auto)  0.2    (0.0-0.4)  K/mm3


 


Baso # (Auto)  0.0    (0.0-0.1)  K/mm3


 


Seg Neutrophils %  81.2 H    (40.0-70.0)  %


 


Seg Neutrophils #  6.8    (1.8-7.7)  K/mm3


 


PT   13.5   (12.2-14.9)  Sec.


 


INR   1.05   (0.87-1.13)  


 


APTT   27.8   (24.2-36.6)  Sec.


 


VBG pH     (7.320-7.420)  


 


Sodium    141  (137-145)  mmol/L


 


Potassium    3.7  (3.6-5.0)  mmol/L


 


Chloride    87.0 L  ()  mmol/L


 


Carbon Dioxide    35 H  (22-30)  mmol/L


 


Anion Gap    23  mmol/L


 


BUN    54 H  (7-17)  mg/dL


 


Creatinine    11.3 H  (0.6-1.2)  mg/dL


 


Estimated GFR    4  ml/min


 


BUN/Creatinine Ratio    5  %


 


Glucose    111 H  ()  mg/dL


 


Lactic Acid     (0.7-2.0)  mmol/L


 


Calcium    8.3 L  (8.4-10.2)  mg/dL


 


Magnesium    1.40 L  (1.7-2.3)  mg/dL


 


Total Bilirubin    0.40  (0.1-1.2)  mg/dL


 


AST    9  (5-40)  units/L


 


ALT    5 L  (7-56)  units/L


 


Alkaline Phosphatase    148 H  ()  units/L


 


Troponin T    0.103 H*  (0.00-0.029)  ng/mL


 


NT-Pro-B Natriuret Pep     (0-900)  pg/mL


 


Total Protein    6.9  (6.3-8.2)  g/dL


 


Albumin    3.5 L  (3.9-5)  g/dL


 


Albumin/Globulin Ratio    1.0  %


 


Triglycerides    93  (2-149)  mg/dL


 


Cholesterol    121  ()  mg/dL


 


LDL Cholesterol Direct    60  ()  mg/dL


 


HDL Cholesterol    45  (40-59)  mg/dL


 


Cholesterol/HDL Ratio    2.68  %


 


Lipase    26  (13-60)  units/L














  05/11/21 05/11/21 05/11/21 Range/Units





  18:35 18:35 18:35 


 


WBC     (4.5-11.0)  K/mm3


 


RBC     (3.65-5.03)  M/mm3


 


Hgb     (10.1-14.3)  gm/dl


 


Hct     (30.3-42.9)  %


 


MCV     (79-97)  fl


 


MCH     (28-32)  pg


 


MCHC     (30-34)  %


 


RDW     (13.2-15.2)  %


 


Plt Count     (140-440)  K/mm3


 


Lymph % (Auto)     (13.4-35.0)  %


 


Mono % (Auto)     (0.0-7.3)  %


 


Eos % (Auto)     (0.0-4.3)  %


 


Baso % (Auto)     (0.0-1.8)  %


 


Lymph # (Auto)     (1.2-5.4)  K/mm3


 


Mono # (Auto)     (0.0-0.8)  K/mm3


 


Eos # (Auto)     (0.0-0.4)  K/mm3


 


Baso # (Auto)     (0.0-0.1)  K/mm3


 


Seg Neutrophils %     (40.0-70.0)  %


 


Seg Neutrophils #     (1.8-7.7)  K/mm3


 


PT     (12.2-14.9)  Sec.


 


INR     (0.87-1.13)  


 


APTT     (24.2-36.6)  Sec.


 


VBG pH    7.502 H  (7.320-7.420)  


 


Sodium     (137-145)  mmol/L


 


Potassium     (3.6-5.0)  mmol/L


 


Chloride     ()  mmol/L


 


Carbon Dioxide     (22-30)  mmol/L


 


Anion Gap     mmol/L


 


BUN     (7-17)  mg/dL


 


Creatinine     (0.6-1.2)  mg/dL


 


Estimated GFR     ml/min


 


BUN/Creatinine Ratio     %


 


Glucose     ()  mg/dL


 


Lactic Acid  2.20 H*    (0.7-2.0)  mmol/L


 


Calcium     (8.4-10.2)  mg/dL


 


Magnesium     (1.7-2.3)  mg/dL


 


Total Bilirubin     (0.1-1.2)  mg/dL


 


AST     (5-40)  units/L


 


ALT     (7-56)  units/L


 


Alkaline Phosphatase     ()  units/L


 


Troponin T     (0.00-0.029)  ng/mL


 


NT-Pro-B Natriuret Pep   5911 H   (0-900)  pg/mL


 


Total Protein     (6.3-8.2)  g/dL


 


Albumin     (3.9-5)  g/dL


 


Albumin/Globulin Ratio     %


 


Triglycerides     (2-149)  mg/dL


 


Cholesterol     ()  mg/dL


 


LDL Cholesterol Direct     ()  mg/dL


 


HDL Cholesterol     (40-59)  mg/dL


 


Cholesterol/HDL Ratio     %


 


Lipase     (13-60)  units/L














  05/11/21 Range/Units





  20:58 


 


WBC   (4.5-11.0)  K/mm3


 


RBC   (3.65-5.03)  M/mm3


 


Hgb   (10.1-14.3)  gm/dl


 


Hct   (30.3-42.9)  %


 


MCV   (79-97)  fl


 


MCH   (28-32)  pg


 


MCHC   (30-34)  %


 


RDW   (13.2-15.2)  %


 


Plt Count   (140-440)  K/mm3


 


Lymph % (Auto)   (13.4-35.0)  %


 


Mono % (Auto)   (0.0-7.3)  %


 


Eos % (Auto)   (0.0-4.3)  %


 


Baso % (Auto)   (0.0-1.8)  %


 


Lymph # (Auto)   (1.2-5.4)  K/mm3


 


Mono # (Auto)   (0.0-0.8)  K/mm3


 


Eos # (Auto)   (0.0-0.4)  K/mm3


 


Baso # (Auto)   (0.0-0.1)  K/mm3


 


Seg Neutrophils %   (40.0-70.0)  %


 


Seg Neutrophils #   (1.8-7.7)  K/mm3


 


PT   (12.2-14.9)  Sec.


 


INR   (0.87-1.13)  


 


APTT   (24.2-36.6)  Sec.


 


VBG pH   (7.320-7.420)  


 


Sodium   (137-145)  mmol/L


 


Potassium   (3.6-5.0)  mmol/L


 


Chloride   ()  mmol/L


 


Carbon Dioxide   (22-30)  mmol/L


 


Anion Gap   mmol/L


 


BUN   (7-17)  mg/dL


 


Creatinine   (0.6-1.2)  mg/dL


 


Estimated GFR   ml/min


 


BUN/Creatinine Ratio   %


 


Glucose   ()  mg/dL


 


Lactic Acid  2.00  (0.7-2.0)  mmol/L


 


Calcium   (8.4-10.2)  mg/dL


 


Magnesium   (1.7-2.3)  mg/dL


 


Total Bilirubin   (0.1-1.2)  mg/dL


 


AST   (5-40)  units/L


 


ALT   (7-56)  units/L


 


Alkaline Phosphatase   ()  units/L


 


Troponin T   (0.00-0.029)  ng/mL


 


NT-Pro-B Natriuret Pep   (0-900)  pg/mL


 


Total Protein   (6.3-8.2)  g/dL


 


Albumin   (3.9-5)  g/dL


 


Albumin/Globulin Ratio   %


 


Triglycerides   (2-149)  mg/dL


 


Cholesterol   ()  mg/dL


 


LDL Cholesterol Direct   ()  mg/dL


 


HDL Cholesterol   (40-59)  mg/dL


 


Cholesterol/HDL Ratio   %


 


Lipase   (13-60)  units/L














- EKG Data


-: EKG Interpreted by Me


EKG shows normal: sinus rhythm (PACs), axis, intervals, QRS complexes, ST-T 

waves


Rate: normal





- EKG Data


When compared to previous EKG there are: no significant change


Interpretation: unchanged when compared t (10/25/20)





- Radiology Data


Radiology results: image reviewed


interpreted by me: 





Chest x-ray does not show any acute process.  There are no pleural effusions, 

obvious pneumonia and there is no pneumothorax. No significant cardiomegaly.





- Medical Decision Making





This patient presents to the emergency department with a return or continuation 

of her abdominal pain, as well as the development of some midsternal chest pain 

since earlier in the day.  EKG does not have any morphology consistent with ST 

elevation myocardial infarction.  Chest x-ray does not show any pneumonia, 

pleural effusions, pneumothorax, or any other acute process.  Patient's labs 

show renal insufficiency consistent with her end-stage renal disease on 

peritoneal dialysis.  She also has an elevated troponin level of about 0.103.  

The troponin may be elevated secondary to the patient's history of end-stage 

renal disease.  However, the patient does complain of some midsternal chest 

pain.  For these reasons the patient will be admitted to the hospital for 

further evaluation and treatment and was accepted for admission by the 

hospitalist, Dr. Harper. 


Critical Care Time: No


Critical care attestation.: 


If time is entered above; I have spent that time in minutes in the direct care 

of this critically ill patient, excluding procedure time.








ED Disposition


Clinical Impression: 


 Acute chest pain, End-stage renal disease on peritoneal dialysis, Elevated 

troponin





Abdominal pain


Qualifiers:


 Abdominal location: generalized Qualified Code(s): R10.84 - Generalized 

abdominal pain





Hypertension


Qualifiers:


 Hypertension type: essential hypertension Qualified Code(s): I10 - Essential 

(primary) hypertension





Disposition: DC-09 OP ADMIT IP TO THIS HOSP


Is pt being admited?: Yes


Condition: Serious


Time of Disposition: 22:26

## 2021-05-11 NOTE — EVENT NOTE
ED Screening Note


Date of service: 05/11/21


Time: 18:28


ED Screening Note: 


70-year-old female patient with history of congestive heart failure, end-stage 

renal disease (on peritoneal dialysis), and atrial fibrillation (not currently 

anticoagulated) presents to the emergency department with complaints of chest 

pain starting today.  Patient was evaluated in the emergency department 

yesterday for abdominal pain.  Work-up was unremarkable and she was discharged 

home.  The abdominal pain has persisted however the chest pain started today, 

prompting her to return to the emergency department.  Patient was diagnosed with

atrial fibrillation several years ago but she is not currently on anticoagu

lation.





Tachycardic in triage.





General: Awake, appropriately interactive.  Appears uncomfortable.


Neck: Supple. Full range of motion intact. 


Cardiovascular: Normal peripheral perfusion. 


Pulmonary: No respiratory distress. Patient is speaking normally without use of 

accessory muscles.


Skin: No apparent rashes or lesions. 


Neurological: No facial asymmetry. Speech is clear. Follows commands. Patient is

alert and oriented. 


Musculoskeletal: Moves all four extremities spontaneously with normal range of 

motion. 


Psych: Cooperative. Appropriate mood and affect.





I have greeted and performed a focused rapid initial assessment of this patient.

 A comprehensive ED assessment and evaluation of the patient, analysis of all 

test results, and completion of the medical decision-making process will be 

conducted by additional ED providers. This initial assessment/diagnostic 

orders/clinical plan/treatment(s) is/are subject to change based on patients 

health status, clinical progression and re-assessment. Further treatment and 

workup at subsequent clinical provider's discretion. Patient/guardian urged not 

to elope from the ED as their condition may be serious if not clinically 

assessed and managed.

## 2021-05-12 LAB
APTT BLD: 28.1 SEC. (ref 24.2–36.6)
BASOPHILS # (AUTO): 0.1 K/MM3 (ref 0–0.1)
BASOPHILS # (AUTO): 0.1 K/MM3 (ref 0–0.1)
BASOPHILS NFR BLD AUTO: 0.3 % (ref 0–1.8)
BASOPHILS NFR BLD AUTO: 0.6 % (ref 0–1.8)
BUN SERPL-MCNC: 54 MG/DL (ref 7–17)
BUN SERPL-MCNC: 59 MG/DL (ref 7–17)
BUN/CREAT SERPL: 5 %
BUN/CREAT SERPL: 5 %
CALCIUM SERPL-MCNC: 7.7 MG/DL (ref 8.4–10.2)
CALCIUM SERPL-MCNC: 8.4 MG/DL (ref 8.4–10.2)
EOSINOPHIL # BLD AUTO: 0 K/MM3 (ref 0–0.4)
EOSINOPHIL # BLD AUTO: 0 K/MM3 (ref 0–0.4)
EOSINOPHIL NFR BLD AUTO: 0.1 % (ref 0–4.3)
EOSINOPHIL NFR BLD AUTO: 0.3 % (ref 0–4.3)
HCT VFR BLD CALC: 29.6 % (ref 30.3–42.9)
HCT VFR BLD CALC: 32.2 % (ref 30.3–42.9)
HEMOLYSIS INDEX: 3
HEMOLYSIS INDEX: 6
HGB BLD-MCNC: 10.2 GM/DL (ref 10.1–14.3)
HGB BLD-MCNC: 9.3 GM/DL (ref 10.1–14.3)
INR PPP: 1.09 (ref 0.87–1.13)
INR PPP: 1.17 (ref 0.87–1.13)
LYMPHOCYTES # BLD AUTO: 0.6 K/MM3 (ref 1.2–5.4)
LYMPHOCYTES # BLD AUTO: 0.8 K/MM3 (ref 1.2–5.4)
LYMPHOCYTES NFR BLD AUTO: 3.4 % (ref 13.4–35)
LYMPHOCYTES NFR BLD AUTO: 4.8 % (ref 13.4–35)
MCHC RBC AUTO-ENTMCNC: 32 % (ref 30–34)
MCHC RBC AUTO-ENTMCNC: 32 % (ref 30–34)
MCV RBC AUTO: 93 FL (ref 79–97)
MCV RBC AUTO: 93 FL (ref 79–97)
MONOCYTES # (AUTO): 1.3 K/MM3 (ref 0–0.8)
MONOCYTES # (AUTO): 1.4 K/MM3 (ref 0–0.8)
MONOCYTES % (AUTO): 7.3 % (ref 0–7.3)
MONOCYTES % (AUTO): 8.3 % (ref 0–7.3)
PLATELET # BLD: 267 K/MM3 (ref 140–440)
PLATELET # BLD: 287 K/MM3 (ref 140–440)
RBC # BLD AUTO: 3.2 M/MM3 (ref 3.65–5.03)
RBC # BLD AUTO: 3.48 M/MM3 (ref 3.65–5.03)

## 2021-05-12 RX ADMIN — CINACALCET HYDROCHLORIDE SCH MG: 30 TABLET, FILM COATED ORAL at 11:03

## 2021-05-12 RX ADMIN — CEFEPIME SCH MLS/HR: 1 INJECTION, POWDER, FOR SOLUTION INTRAMUSCULAR; INTRAVENOUS at 19:06

## 2021-05-12 RX ADMIN — Medication SCH UNIT: at 11:03

## 2021-05-12 RX ADMIN — INSULIN LISPRO SCH: 100 INJECTION, SOLUTION INTRAVENOUS; SUBCUTANEOUS at 21:26

## 2021-05-12 RX ADMIN — ASPIRIN SCH MG: 325 TABLET, COATED ORAL at 11:03

## 2021-05-12 RX ADMIN — HEPARIN SODIUM SCH MLS/HR: 5000 INJECTION, SOLUTION INTRAVENOUS at 01:35

## 2021-05-12 RX ADMIN — NIFEDIPINE SCH MG: 60 TABLET, EXTENDED RELEASE ORAL at 21:25

## 2021-05-12 RX ADMIN — Medication SCH ML: at 21:27

## 2021-05-12 RX ADMIN — INSULIN LISPRO SCH: 100 INJECTION, SOLUTION INTRAVENOUS; SUBCUTANEOUS at 07:54

## 2021-05-12 RX ADMIN — INSULIN LISPRO SCH: 100 INJECTION, SOLUTION INTRAVENOUS; SUBCUTANEOUS at 17:20

## 2021-05-12 RX ADMIN — Medication SCH ML: at 11:03

## 2021-05-12 RX ADMIN — ONDANSETRON PRN MG: 2 INJECTION INTRAMUSCULAR; INTRAVENOUS at 16:28

## 2021-05-12 RX ADMIN — MORPHINE SULFATE PRN MG: 4 INJECTION, SOLUTION INTRAMUSCULAR; INTRAVENOUS at 11:05

## 2021-05-12 RX ADMIN — CALCITRIOL SCH MCG: 0.5 CAPSULE, LIQUID FILLED ORAL at 17:20

## 2021-05-12 RX ADMIN — CALCIUM ACETATE SCH MG: 667 CAPSULE ORAL at 21:25

## 2021-05-12 RX ADMIN — INSULIN LISPRO SCH: 100 INJECTION, SOLUTION INTRAVENOUS; SUBCUTANEOUS at 11:42

## 2021-05-12 RX ADMIN — INSULIN GLARGINE SCH UNITS: 100 INJECTION, SOLUTION SUBCUTANEOUS at 21:26

## 2021-05-12 RX ADMIN — MORPHINE SULFATE PRN MG: 4 INJECTION, SOLUTION INTRAMUSCULAR; INTRAVENOUS at 15:38

## 2021-05-12 RX ADMIN — SODIUM CHLORIDE, SODIUM LACTATE, CALCIUM CHLORIDE, MAGNESIUM CHLORIDE AND DEXTROSE SCH ML: 1.5; 538; 448; 18.3; 5.08 INJECTION, SOLUTION INTRAPERITONEAL at 15:33

## 2021-05-12 RX ADMIN — SODIUM CHLORIDE, SODIUM LACTATE, CALCIUM CHLORIDE, MAGNESIUM CHLORIDE AND DEXTROSE SCH ML: 1.5; 538; 448; 18.3; 5.08 INJECTION, SOLUTION INTRAPERITONEAL at 23:08

## 2021-05-12 NOTE — XRAY REPORT
ABDOMEN 2 VIEWS



INDICATION / CLINICAL INFORMATION:

Abdominal pain.



COMPARISON: 

CT abdomen and pelvis without contrast from 5/10/2021.



FINDINGS:



TUBES / LINES: Unchanged peritoneal dialysis catheter.

BOWEL GAS PATTERN: No significant abnormality. 

FREE AIR / EXTRALUMINAL GAS: None seen.



ADDITIONAL FINDINGS: Generalized atherosclerosis along the pelvis is unchanged.



CHEST: Visualized chest shows no significant abnormality.



IMPRESSION:

No acute abnormality of the abdomen.



Signer Name: Javid Ellsworth MD 

Signed: 5/12/2021 4:31 PM

Workstation Name: [id number]

## 2021-05-12 NOTE — HISTORY AND PHYSICAL REPORT
History of Present Illness


Date of examination: 05/12/21


Date of admission: 


05/11/21 22:26





Chief complaint: 





Midsternal Chest Pain


Abdominal Pain


History of present illness: 





70-year-old -American female with known history of diabetes mellitus, 

CHF, hypertension, end-stage renal disease on peritoneal dialysis presents to 

the emergency room today complaining of abdominal pain and midsternal chest 

pain.  He presented with a similar episode about 24 to 48 hours ago in the 

emergency room.  She had a negative CT scan of the abdomen and pelvis without 

contrast when evaluated yesterday but presents again today with worsening 

symptoms.  Patient has been compliant with peritoneal dialysis but has not had 

it prior to reporting to the emergency room today.


Patient denies any fever or chills but has had some occasional nausea and 

vomiting.  On a scale of 10 pain was about 7/10 in severity.  No known relieving

or exacerbating factor.





Patient denies any sick contacts and no recent travel.  Denies any contact with 

anyone with COVID-19.





Work-up in the emergency room today chest x-ray and EKG were unremarkable.  

Troponin was elevated at 0.103.





Patient is being admitted for chest pain and abdominal pain evaluation.





Past History


Past Medical History: arthritis, diabetes, dialysis (Peritoneal dialysis), ESRD,

heart failure, other (Asthma in childhood,Dilated cardiomyopathy)


Past Surgical History: appendectomy (In 1985), Other (Tonsillectomy)


Social history: no significant social history


Family history: no significant family history





Medications and Allergies


                                    Allergies











Allergy/AdvReac Type Severity Reaction Status Date / Time


 


digoxin Allergy  Hives Verified 05/11/21 18:22


 


lisinopril Allergy  Swelling Verified 05/11/21 18:22


 


nitroglycerin AdvReac  Rash, Verified 05/11/21 18:22





   HEADACHES  


 


shellfish derived AdvReac  Swelling Verified 05/11/21 18:22











                                Home Medications











 Medication  Instructions  Recorded  Confirmed  Last Taken  Type


 


NIFEdipine XL [Procardia Xl] 60 mg PO Q12HR 08/21/15 10/25/20 10/24/20 History


 


carvediloL [Coreg] 6.25 mg PO BID 08/21/15 10/25/20 10/24/20 History


 


Insulin Detemir (Nf) [Levemir 15 unit SQ QHS PRN 12/09/16 10/25/20 10/25/19 

History





Flextouch (Nf)]     


 


Insulin Lispro [HumaLOG VIAL] 0 units SQ AC PRN 12/09/16 10/25/20 10/25/19 

History


 


cloNIDine [Catapres] 0.2 mg PO QHS 12/09/16 10/25/20 10/24/20 History


 


Ibuprofen [Motrin 600 MG tab] 600 mg PO Q8H PRN #20 tablet 10/04/18 10/25/20 

Unknown Rx


 


Cholecalciferol (Vitamin D3) 3,000 unit PO DAILY 10/25/20 10/25/20 10/24/20 

History





[Vitamin D3 3,000 unit]     


 


Cinacalcet HCl [Sensipar] 60 mg PO DAILY 10/25/20 10/25/20 Unknown History


 


Gentamicin 0.1% Top Oint(Nf) 1 applic TP TID 10/25/20 10/25/20 Unknown History





[Gentamicin 0.1% Top Oint (Nf)]     


 


Acetaminophen [Acetaminophen TAB] 650 mg PO Q4H PRN  tablet 10/26/20  Unknown Rx


 


Cholecalciferol Vit D3 [Vitamin D3 3,000 unit PO DAILY  tablet 10/26/20  Unknown

 Rx





1,000 UNIT TAB]     


 


Cinacalcet [Sensipar] 60 mg PO QDAY  tablet 10/26/20  Unknown Rx


 


Insulin Glargine [Lantus VIAL] 22 units SUB-Q QHS PRN  units 10/26/20  Unknown 

Rx


 


Insulin Lispro [Humalog] 0 unit SUB-Q Q6H  vial 10/26/20  Unknown Rx


 


Sodium Chloride 0.9% Int [Sodium 10 ml IV PRN PRN  syringe 10/26/20  Unknown Rx





Chloride Flush Syringe 10 ml]     


 


risperiDONE [RisperDAL] 0.25 mg PO BID #60 tab 10/26/20  Unknown Rx


 


risperiDONE [RisperDAL] 0.25 mg PO BID #60 tablet 10/26/20  Unknown Rx














Review of Systems


Constitutional: no fever, no chills


Ears, nose, mouth and throat: no nasal congestion, no sore throat


Cardiovascular: chest pain, no palpitations


Respiratory: no cough, no shortness of breath


Gastrointestinal: abdominal pain, no nausea, no vomiting, no diarrhea


Genitourinary Female: no pelvic pain, no flank pain, no dysuria, no hematuria


Musculoskeletal: no neck pain, no low back pain


Integumentary: no rash, no pruritis


Neurological: no headaches, no confusion


Psychiatric: no anxiety, no depression


Endocrine: no polydipsia, no polyuria, no nocturia





Exam





- Constitutional


Vitals: 


                                        











Temp Pulse Resp BP Pulse Ox


 


 98.7 F   115 H  27 H  160/80   90 


 


 05/11/21 18:20  05/11/21 23:30  05/11/21 23:30  05/11/21 23:30  05/11/21 23:30











General appearance: Present: no acute distress, well-nourished





- EENT


Eyes: Present: PERRL, EOM intact.  Absent: scleral icterus


ENT: hearing intact, clear oral mucosa, dentition normal





- Neck


Neck: Present: supple, normal ROM





- Respiratory


Respiratory effort: normal


Respiratory: bilateral: CTA





- Cardiovascular


Rhythm: regular


Heart Sounds: Present: S1 & S2.  Absent: gallop, systolic murmur, diastolic 

murmur, rub, click





- Extremities


Extremities: no ischemia, pulses intact, pulses symmetrical, No edema, normal 

temperature, normal color, Full ROM


Peripheral Pulses: within normal limits





- Abdominal


General gastrointestinal: Present: soft, tender (Mild generalized tenderness.), 

normal bowel sounds.  Absent: mass





- Integumentary


Integumentary: Present: clear, warm, dry.  Absent: rash





- Musculoskeletal


Musculoskeletal: strength equal bilaterally





- Psychiatric


Psychiatric: appropriate mood/affect, intact judgment & insight, memory intact, 

cooperative





- Neurologic


Neurologic: CNII-XII intact, no focal deficits, moves all extremities





HEART Score





- HEART Score


History: Slightly suspicious


EKG: Normal


Age: > 65


Risk factors: 1-2 risk factors


Troponin: 


                                        











Troponin T  0.108 ng/mL (0.00-0.029)  H*  05/11/21  22:45    











Troponin: > 3x normal limit


HEART Score: 5





- Critical Actions


Critical Actions: 4-6 pts:12-16.6% risk of adverse cardiac event. Should be 

admitted





Results





- Labs


CBC & Chem 7: 


                                 05/12/21 06:00





                                 05/12/21 06:00


Labs: 


                              Abnormal lab results











  05/11/21 05/11/21 05/11/21 Range/Units





  18:35 18:35 18:35 


 


RBC  3.48 L    (3.65-5.03)  M/mm3


 


RDW  17.7 H    (13.2-15.2)  %


 


Lymph % (Auto)  9.4 L    (13.4-35.0)  %


 


Lymph # (Auto)  0.8 L    (1.2-5.4)  K/mm3


 


Seg Neutrophils %  81.2 H    (40.0-70.0)  %


 


VBG pH     (7.320-7.420)  


 


Chloride   87.0 L   ()  mmol/L


 


Carbon Dioxide   35 H   (22-30)  mmol/L


 


BUN   54 H   (7-17)  mg/dL


 


Creatinine   11.3 H   (0.6-1.2)  mg/dL


 


Glucose   111 H   ()  mg/dL


 


Lactic Acid    2.20 H*  (0.7-2.0)  mmol/L


 


Calcium   8.3 L   (8.4-10.2)  mg/dL


 


Magnesium   1.40 L   (1.7-2.3)  mg/dL


 


ALT   5 L   (7-56)  units/L


 


Alkaline Phosphatase   148 H   ()  units/L


 


Troponin T   0.103 H*   (0.00-0.029)  ng/mL


 


NT-Pro-B Natriuret Pep     (0-900)  pg/mL


 


Albumin   3.5 L   (3.9-5)  g/dL














  05/11/21 05/11/21 05/11/21 Range/Units





  18:35 18:35 22:45 


 


RBC     (3.65-5.03)  M/mm3


 


RDW     (13.2-15.2)  %


 


Lymph % (Auto)     (13.4-35.0)  %


 


Lymph # (Auto)     (1.2-5.4)  K/mm3


 


Seg Neutrophils %     (40.0-70.0)  %


 


VBG pH   7.502 H   (7.320-7.420)  


 


Chloride     ()  mmol/L


 


Carbon Dioxide     (22-30)  mmol/L


 


BUN     (7-17)  mg/dL


 


Creatinine     (0.6-1.2)  mg/dL


 


Glucose     ()  mg/dL


 


Lactic Acid     (0.7-2.0)  mmol/L


 


Calcium     (8.4-10.2)  mg/dL


 


Magnesium     (1.7-2.3)  mg/dL


 


ALT     (7-56)  units/L


 


Alkaline Phosphatase     ()  units/L


 


Troponin T    0.108 H*  (0.00-0.029)  ng/mL


 


NT-Pro-B Natriuret Pep  5911 H    (0-900)  pg/mL


 


Albumin     (3.9-5)  g/dL














Assessment and Plan





- Patient Problems


(1) Acute chest pain


Current Visit: Yes   Status: Acute   


Plan to address problem: 


Patient placed on telemetry.


We will check serial cardiac enzymes.


Patient be placed on aspirin, sublingual nitroglycerin and IV morphine as needed

 for chest pain.


Troponin has been elevated probably secondary to the end-stage renal disease 

however patient has been placed on heparin drip prior to cardiology evaluation 

and recommendation.








(2) Abdominal pain


Current Visit: Yes   Status: Acute   


Qualifiers: 


   Abdominal location: generalized   Qualified Code(s): R10.84 - Generalized 

abdominal pain   


Plan to address problem: 


Etiology unclear.  CT of the abdomen and pelvis has been negative.


Will place on analgesic medications as needed.











(3) End-stage renal disease on peritoneal dialysis


Current Visit: Yes   Status: Acute   


Plan to address problem: 


Patient on peritoneal dialysis.  Consult placed to nephrology for evaluation.








(4) Hypertension


Current Visit: Yes   Status: Acute   


Qualifiers: 


   Hypertension type: essential hypertension   Qualified Code(s): I10 - 

Essential (primary) hypertension   


Plan to address problem: 


We will resume routine home medications once reconciled and monitor vital signs 

closely.








(5) DVT prophylaxis


Current Visit: No   Status: Acute   


Plan to address problem: 


Patient currently on anticoagulation.








(6) Full code status


Current Visit: Yes   Status: Acute   


Plan to address problem: 


Patient is a full code.

## 2021-05-12 NOTE — CONSULTATION
History of Present Illness


Consult date: 05/12/21


Consult reason: chest pain


History of present illness: 





This is a 70-year old woman with a history of hypertension and end stage renal 

disease, on peritoneal dialysis. No prior cardiac history. In 2015, an 

echocardiogram showed normal left ventricular systolic function, ejection 

fraction 55-60%. 





She presents to this hospital with abdominal pain, nausea and vomiting followed 

with atypical chest pain. The patient denies chest pain on exertion and she has 

no shortness of breath. Serial troponin measurements are elevated, likely in the

setting of renal disease. She has a of creatinine 12.9. Chest x-ray reports no 

acute abnormalities and her ECG is sinus rhythm. No acute ST or T wave changes. 

Patient was admitted by the hospitalist and ordered a cardiac consultation. 





Past History


Past Medical History: arthritis, diabetes, dialysis (Peritoneal dialysis), ESRD,

other (Asthma in childhood)


Past Surgical History: appendectomy (In 1985), Other (Tonsillectomy)


Social history: no significant social history


Family history: no significant family history





Medications and Allergies


                                    Allergies











Allergy/AdvReac Type Severity Reaction Status Date / Time


 


digoxin Allergy  Hives Verified 05/11/21 18:22


 


lisinopril Allergy  Swelling Verified 05/11/21 18:22


 


nitroglycerin AdvReac  Rash, Verified 05/11/21 18:22





   HEADACHES  


 


shellfish derived AdvReac  Swelling Verified 05/11/21 18:22











                                Home Medications











 Medication  Instructions  Recorded  Confirmed  Last Taken  Type


 


NIFEdipine XL [Procardia Xl] 60 mg PO HS 08/21/15 10/25/20 10/24/20 History


 


carvediloL [Coreg] 6.25 mg PO BID 08/21/15 10/25/20 10/24/20 History


 


Insulin Detemir (Nf) [Levemir 15 unit SQ QHS PRN 12/09/16 10/25/20 10/25/19 

History





Flextouch (Nf)]     


 


cloNIDine [Catapres] 0.2 mg PO DAILY 12/09/16 10/25/20 10/24/20 History


 


Gentamicin 0.1% Top Oint(Nf) 1 applic TP TID 10/25/20 10/25/20 Unknown History





[Gentamicin 0.1% Top Oint (Nf)]     


 


Cholecalciferol Vit D3 [Vitamin D3 3,000 unit PO DAILY  tablet 10/26/20  Unknown

 Rx





1,000 UNIT TAB]     


 


Cinacalcet [Sensipar] 90 mg PO QDAY 05/12/21  Unknown History


 


Hydralazine HCl 50 mg PO TID 05/12/21 05/12/21 Unknown History











Active Meds: 


Active Medications





Acetaminophen (Acetaminophen 325 Mg Tab)  650 mg PO Q4H PRN


   PRN Reason: Pain MILD(1-3)/Fever >100.5/HA


Aspirin (Aspirin Ec 325 Mg Tab)  325 mg PO QDAY [facility]


Carvedilol (Carvedilol 6.25 Mg Tab)  6.25 mg PO BID [facility]


Cholecalciferol (Cholecalciferol (Vit D3) 1000 Unit (25 Mcg) Tab)  3,000 unit PO

 DAILY [facility]


Cinacalcet (Cinacalcet 30 Mg Tab)  90 mg PO QDAY [facility]


Clonidine HCl (Clonidine 0.2 Mg Tab)  0.2 mg PO DAILY [facility]


Dextrose (Dextrose 50% In Water (25gm) 50 Ml Syringe)  50 ml IV Q30MIN PRN; 

Protocol


   PRN Reason: Hypoglycemia


Heparin Sodium (Porcine) (Heparin 10,000 Units/10 Ml Vial)  2,500 unit 40 

unit/kg (2500 unit) IV Q6H PRN


   PRN Reason: Anti-Xa Assay < 0.1 units/ml


Heparin Sodium/Sodium Chloride (Heparin/ 0.45% Nacl-25,000 Unit/500 Ml)  25,000 

unit in 500 mls @ 18 mls/hr IV TITRATE [facility]; Protocol


   Last Titration: 05/12/21 07:07 Dose:  1,000 units/hr, 20 mls/hr


   Documented by: 


Sodium Chloride (Nacl 0.9%)  100 mls @ 999 mls/hr IV KYLIE PRN


   PRN Reason: Hypotension


Ceftriaxone Sodium (Rocephin/Ns 1 Gm/50 Ml)  1 gm in 50 mls @ 100 mls/hr IV Q12H

 [facility]; Protocol


Insulin Human Lispro (Insulin Lispro 100 Unit/Ml)  0 unit SUB-Q ACHS [facility]; 

Protocol


   Last Admin: 05/12/21 07:54 Dose:  Not Given


   Documented by: 


Magnesium Hydroxide (Magnesium Hydroxide (Mom) Oral Liqd Udc)  30 ml PO Q4H PRN


   PRN Reason: Constipation


Miscellaneous Medication (Gentamicin 0.1% Top Oint)  1 applic TP TID [facility]


Miscellaneous Medication (Hydralazine Hcl [Hydralazine Hcl])  50 mg PO TID [facility]


Miscellaneous Medication (Insulin Detemir (Nf))  15 unit SQ QHS PRN


   PRN Reason: HIGH GLUCOSE


Morphine Sulfate (Morphine 4 Mg/1 Ml Inj)  2 mg IV Q5MIN PRN


   PRN Reason: Chest Pain


Nifedipine (Nifedipine Xl 60 Mg Tab)  60 mg PO HS [facility]


Ondansetron HCl (Ondansetron 4 Mg/2 Ml Inj)  4 mg IV Q8H PRN


   PRN Reason: Nausea And Vomiting


Peritoneal Dialysis Solution (Dialysate Lo Aneesh 1.5% Soln 2000 Ml)  2,500 ml IP 

Q6HR [facility]


Sodium Chloride (Sodium Chloride 0.9% 10 Ml Flush Syringe)  10 ml IV BID [facility]


Sodium Chloride (Sodium Chloride 0.9% 10 Ml Flush Syringe)  10 ml IV PRN PRN


   PRN Reason: LINE FLUSH


Tramadol HCl (Tramadol 50 Mg Tab)  50 mg PO Q6H PRN


   PRN Reason: Pain, Moderate (4-6)











Review of Systems


Cardiovascular: chest pain, no palpitations, no syncope, no shortness of breath,

no dyspnea on exertion





Physical Examination


                                   Vital Signs











Temp Pulse Resp BP Pulse Ox


 


 98.7 F   104 H  22   176/97   100 


 


 05/11/21 18:20  05/11/21 18:20  05/11/21 18:20  05/11/21 18:20  05/11/21 18:20











General appearance: no acute distress


HEENT: Positive: PERRL


Neck: Positive: trachea midline


Cardiac: Positive: Reg Rate and Rhythm


Lungs: Positive: Normal Breath Sounds


Extremities: Absent: edema





Results





                                 05/12/21 06:00





                                 05/12/21 06:00


                                 Cardiac Enzymes











  05/11/21 Range/Units





  18:35 


 


AST  9  (5-40)  units/L








                                   Coagulation











  05/11/21 05/12/21 05/12/21 Range/Units





  18:35 00:43 06:00 


 


PT  13.5  13.9  14.7  (12.2-14.9)  Sec.


 


INR  1.05  1.09  1.17 H  (0.87-1.13)  


 


APTT  27.8  28.1   (24.2-36.6)  Sec.








                                     Lipids











  05/11/21 Range/Units





  18:35 


 


Triglycerides  93  (2-149)  mg/dL


 


Cholesterol  121  ()  mg/dL


 


HDL Cholesterol  45  (40-59)  mg/dL


 


Cholesterol/HDL Ratio  2.68  %








                                       CBC











  05/11/21 05/12/21 05/12/21 Range/Units





  18:35 00:43 00:43 


 


WBC  8.4  17.4 H   (4.5-11.0)  K/mm3


 


RBC  3.48 L  3.48 L   (3.65-5.03)  M/mm3


 


Hgb  10.7  10.2   (10.1-14.3)  gm/dl


 


Hct  32.9  32.2   (30.3-42.9)  %


 


Plt Count  311  287  298  (140-440)  K/mm3


 


Lymph # (Auto)  0.8 L  0.6 L   (1.2-5.4)  K/mm3


 


Mono # (Auto)  0.6  1.3 H   (0.0-0.8)  K/mm3


 


Eos # (Auto)  0.2  0.0   (0.0-0.4)  K/mm3


 


Baso # (Auto)  0.0  0.1   (0.0-0.1)  K/mm3














  05/12/21 Range/Units





  06:00 


 


WBC  17.3 H  (4.5-11.0)  K/mm3


 


RBC  3.20 L  (3.65-5.03)  M/mm3


 


Hgb  9.3 L  (10.1-14.3)  gm/dl


 


Hct  29.6 L  (30.3-42.9)  %


 


Plt Count  267  (140-440)  K/mm3


 


Lymph # (Auto)  0.8 L  (1.2-5.4)  K/mm3


 


Mono # (Auto)  1.4 H  (0.0-0.8)  K/mm3


 


Eos # (Auto)  0.0  (0.0-0.4)  K/mm3


 


Baso # (Auto)  0.1  (0.0-0.1)  K/mm3








                          Comprehensive Metabolic Panel











  05/11/21 05/12/21 05/12/21 Range/Units





  18:35 00:43 06:00 


 


Sodium  141  146 H  144  (137-145)  mmol/L


 


Potassium  3.7  3.9  3.8  (3.6-5.0)  mmol/L


 


Chloride  87.0 L  88.1 L  89.8 L  ()  mmol/L


 


Carbon Dioxide  35 H  39 H  39 H  (22-30)  mmol/L


 


BUN  54 H  54 H  59 H  (7-17)  mg/dL


 


Creatinine  11.3 H  11.4 H  12.9 H  (0.6-1.2)  mg/dL


 


Glucose  111 H  100  101 H  ()  mg/dL


 


Calcium  8.3 L  8.4  7.7 L  (8.4-10.2)  mg/dL


 


AST  9    (5-40)  units/L


 


ALT  5 L    (7-56)  units/L


 


Alkaline Phosphatase  148 H    ()  units/L


 


Total Protein  6.9    (6.3-8.2)  g/dL


 


Albumin  3.5 L    (3.9-5)  g/dL














Assessment and Plan





- Patient Problems


(1) Elevated troponin


Current Visit: Yes   Status: Acute   





(2) Abdominal pain


Current Visit: Yes   Status: Acute   


Qualifiers: 


   Qualified Code(s): R10.84 - Generalized abdominal pain   





(3) End-stage renal disease on peritoneal dialysis


Current Visit: Yes   Status: Acute   





(4) Hypertension


Current Visit: Yes   Status: Acute   


Qualifiers: 


   Qualified Code(s): I10 - Essential (primary) hypertension

## 2021-05-12 NOTE — CONSULTATION
History of Present Illness





- Reason for Consult


Consult date: 05/12/21


end stage renal disease





- History of Present Illness


This is a 70-year-old -American woman with end-stage renal disease on 

peritoneal dialysis, diabetes mellitus type 2, hypertension, congestive heart 

failure who presents with 2 day history of abdominal pain.  She describes the 

pain as constant and present in the lower abdomen in both quadrants.  She notes 

7/10 intensity of pain.  She notes intermittent nausea and vomiting.  She denies

constipation.  She also notes substernal chest pain but denies diaphoresis, p

resyncope and syncope. Nephrology was consulted for ESRD management.  She denies

history of peritonitis.








Past History


Past Medical History: arthritis, diabetes, dialysis (Peritoneal dialysis), ESRD,

other (Asthma in childhood)


Past Surgical History: appendectomy (In 1985), Other (Tonsillectomy)


Social history: no significant social history


Family history: no significant family history





Medications and Allergies


                                    Allergies











Allergy/AdvReac Type Severity Reaction Status Date / Time


 


digoxin Allergy  Hives Verified 05/11/21 18:22


 


lisinopril Allergy  Swelling Verified 05/11/21 18:22


 


nitroglycerin AdvReac  Rash, Verified 05/11/21 18:22





   HEADACHES  


 


shellfish derived AdvReac  Swelling Verified 05/11/21 18:22











                                Home Medications











 Medication  Instructions  Recorded  Confirmed  Last Taken  Type


 


NIFEdipine XL [Procardia Xl] 60 mg PO HS 08/21/15 05/12/21 10/24/20 History


 


carvediloL [Coreg] 6.25 mg PO BID 08/21/15 05/12/21 10/24/20 History


 


Insulin Detemir (Nf) [Levemir 15 unit SQ QHS PRN 12/09/16 05/12/21 10/25/19 

History





Flextouch (Nf)]     


 


cloNIDine [Catapres] 0.2 mg PO DAILY 12/09/16 05/12/21 10/24/20 History


 


Gentamicin 0.1% Top Oint(Nf) 1 applic TP TID 10/25/20 05/12/21 Unknown History





[Gentamicin 0.1% Top Oint (Nf)]     


 


Cholecalciferol Vit D3 [Vitamin D3 3,000 unit PO DAILY  tablet 10/26/20 05/12/21

 Unknown Rx





1,000 UNIT TAB]     


 


Cinacalcet [Sensipar] 90 mg PO QDAY 05/12/21 05/12/21 Unknown History


 


Hydralazine HCl 50 mg PO TID 05/12/21 05/12/21 Unknown History











Active Meds: 


Active Medications





Acetaminophen (Acetaminophen 325 Mg Tab)  650 mg PO Q4H PRN


   PRN Reason: Pain MILD(1-3)/Fever >100.5/HA


Aspirin (Aspirin Ec 325 Mg Tab)  325 mg PO QDAY [facility]


   Last Admin: 05/12/21 11:03 Dose:  325 mg


   Documented by: 


Carvedilol (Carvedilol 6.25 Mg Tab)  6.25 mg PO BID [facility]


   Last Admin: 05/12/21 11:03 Dose:  6.25 mg


   Documented by: 


Cholecalciferol (Cholecalciferol (Vit D3) 1000 Unit (25 Mcg) Tab)  3,000 unit PO

 DAILY [facility]


   Last Admin: 05/12/21 11:03 Dose:  3,000 unit


   Documented by: 


Cinacalcet (Cinacalcet 30 Mg Tab)  90 mg PO QDAY [facility]


   Last Admin: 05/12/21 11:03 Dose:  90 mg


   Documented by: 


Clonidine HCl (Clonidine 0.2 Mg Tab)  0.2 mg PO DAILY [facility]


   Last Admin: 05/12/21 11:04 Dose:  0.2 mg


   Documented by: 


Dextrose (Dextrose 50% In Water (25gm) 50 Ml Syringe)  50 ml IV Q30MIN PRN; 

Protocol


   PRN Reason: Hypoglycemia


Heparin Sodium (Porcine) (Heparin 10,000 Units/10 Ml Vial)  2,500 unit 40 

unit/kg (2500 unit) IV Q6H PRN


   PRN Reason: Anti-Xa Assay < 0.1 units/ml


Hydralazine HCl (Hydralazine 25 Mg Tab)  50 mg PO Q8HR [facility]


   Last Admin: 05/12/21 13:32 Dose:  50 mg


   Documented by: 


Heparin Sodium/Sodium Chloride (Heparin/ 0.45% Nacl-25,000 Unit/500 Ml)  25,000 

unit in 500 mls @ 18 mls/hr IV TITRATE [facility]; Protocol


   Last Titration: 05/12/21 07:07 Dose:  1,000 units/hr, 20 mls/hr


   Documented by: 


Sodium Chloride (Nacl 0.9%)  100 mls @ 999 mls/hr IV KYLIE PRN


   PRN Reason: Hypotension


Cefepime HCl (Cefepime/Ns 2 Gm/100 Ml)  2 gm in 100 mls @ 200 mls/hr IV Q24H 

[facility]; Protocol


Vancomycin HCl 1,250 mg/ (Sodium Chloride)  525 mls @ 333 mls/hr IV ONCE ONE; 

Protocol


   Stop: 05/12/21 16:46


Insulin Human Lispro (Insulin Lispro 100 Unit/Ml)  0 unit SUB-Q ACHS ANTONY; 

Protocol


   Last Admin: 05/12/21 11:42 Dose:  Not Given


   Documented by: 


Magnesium Hydroxide (Magnesium Hydroxide (Mom) Oral Liqd Udc)  30 ml PO Q4H PRN


   PRN Reason: Constipation


Miscellaneous Medication (Gentamicin 0.1% Top Oint)  1 applic TP TID ANTONY


Miscellaneous Medication (Insulin Detemir (Nf))  15 unit SQ QHS PRN


   PRN Reason: HIGH GLUCOSE


Morphine Sulfate (Morphine 4 Mg/1 Ml Inj)  2 mg IV Q5MIN PRN


   PRN Reason: Chest Pain


   Last Admin: 05/12/21 11:05 Dose:  2 mg


   Documented by: 


Nifedipine (Nifedipine Xl 60 Mg Tab)  60 mg PO HS [facility]


Ondansetron HCl (Ondansetron 4 Mg/2 Ml Inj)  4 mg IV Q8H PRN


   PRN Reason: Nausea And Vomiting


Peritoneal Dialysis Solution (Dialysate Lo Aneesh 1.5% Soln 2000 Ml)  2,000 ml IP 

Q6H [facility]


Sodium Chloride (Sodium Chloride 0.9% 10 Ml Flush Syringe)  10 ml IV BID [facility]


   Last Admin: 05/12/21 11:03 Dose:  10 ml


   Documented by: 


Sodium Chloride (Sodium Chloride 0.9% 10 Ml Flush Syringe)  10 ml IV PRN PRN


   PRN Reason: LINE FLUSH


Tramadol HCl (Tramadol 50 Mg Tab)  50 mg PO Q6H PRN


   PRN Reason: Pain, Moderate (4-6)











Review of Systems


Constitutional: no fever, no chills


Ears, nose, mouth and throat: no nasal congestion, no nasal discharge


Cardiovascular: chest pain, no syncope


Respiratory: no cough, no shortness of breath


Gastrointestinal: abdominal pain, nausea, vomiting


Musculoskeletal: no muscle weakness, no muscle cramps


Integumentary: no rash, no pruritis


Neurological: no weakness, no parathesias


Psychiatric: no anxiety, no paranoia


Hematologic/Lymphatic: no easy bruising, no easy bleeding


Allergic/Immunologic: no urticaria, no allergic rhinitis





Exam





- Vital Signs


Vital signs: 


                                   Vital Signs











Temp Pulse Resp BP Pulse Ox


 


 98.7 F   104 H  22   176/97   100 


 


 05/11/21 18:20  05/11/21 18:20  05/11/21 18:20  05/11/21 18:20  05/11/21 18:20














- Physical Exam


Narrative exam: 


General appearance: No acute distress


EENT: Anicteric sclera, hearing intact


Neck: Supple


Respiratory: Normal effort, CTA bilaterally


Cardiovascular: RRR, no rub


Extremities: No edema


Abdominal: Soft, tender


Integumentary: Dry, intact


Musculoskeletal: No joint swelling or erythema


Psychiatric: Appropriate mood/affect, judgment intact


Neurologic: No focal deficits, moves all extremities


PD catheter, site is clean with no erythema or purulent drainage











Results





- Lab Results





                                 05/12/21 06:00





                                 05/12/21 06:00


                             Most recent lab results











Calcium  7.7 mg/dL (8.4-10.2)  L  05/12/21  06:00    


 


Magnesium  1.40 mg/dL (1.7-2.3)  L  05/11/21  18:35    














Assessment and Plan


Assessment





End-stage renal disease on PD


Peritonitis?


Anemia of ESRD


Hyperparathyroidism


Hyperphosphatemia


Hypertension





Recommendations





Start manual PD, prescription ordered


Start antibiotics


ID consult


Abdominal x-ray


Continue calcitriol


Continue binder


Continue antihypertensives


Epogen thrice weekly


Renally dose medications


Renal diet

## 2021-05-12 NOTE — PROGRESS NOTE
Assessment and Plan





-- Acute chest pain, likely atypical


Patient placed on telemetry.


We will check serial cardiac enzymes.


Patient be placed on aspirin, sublingual nitroglycerin and IV morphine as needed

for chest pain.





--NSTEMI type II


Troponin has been elevated probably secondary to the end-stage renal disease 

however patient has been placed on heparin drip prior to cardiology evaluation 

and recommendation.





-- Abdominal pain, likely from peritonitis


Etiology unclear.  CT of the abdomen and pelvis has been negative.


Will place on analgesic medications as needed. cont iv abx for possible 

peritonitis


will order for peritoneal fluid study





-- End-stage renal disease on peritoneal dialysis


Patient on peritoneal dialysis.  Consult placed to nephrology for evaluation.





-- Hypertension 


We will resume routine home medications once reconciled and monitor vital signs 

closely.





-- DVT prophylaxis


Patient currently on anticoagulation.





-- Full code status


Patient is a full code.





Daily clinical course: 


5/12/21: start on empiric abx for possible peritonitis. will follow peritoneal 

fluid cx report.  Continue to trend troponin, continue heparin drip, follow 

cardiology recommendation.











Subjective


Date of service: 05/12/21


Interval history: 





Patient seen and examined.  Medical records and medication list reviewed.  


No acute event overnight noted by the RN.  


Patient continued to complains of diffuse abdominal pain also noted to have 

elevated white count


Pending peritoneal dialysis


Discussed plan of care at bedside with patient.








Objective





- Exam


Narrative Exam: 





GENERAL:  well-developed -American female lying on bed appeared to be in 

no discomfort. 


HEENT: Normocephalic.  Atraumatic.  No conjunctival congestion or icterus. 

Patient has moist mucous membranes.


NECK: Supple.  Trachea midline.


CHEST/LUNGS: Clear to auscultated bilaterally, breathing nonlabored. No wheezes 

crackles or rhonchi.


HEART/CARDIOVASCULAR: Regular in rate and rhythm.  S1 and S2 positive.


ABDOMEN: Abdomen is soft but diffusely tender.  Patient has normal bowel sounds.

 


SKIN: There is no rash.  Warm and dry.


NEURO:  No focal motor deficit.  Follows command.


MUSCULOSKELETAL: No joint effusion or tenderness.


EXTRIMITY: No edema, no cyanosis or clubbing.


PSYCH:  Cooperative.








- Constitutional


Vitals: 


                               Vital Signs - 12hr











  05/12/21 05/12/21 05/12/21





  04:16 09:33 11:03


 


Temperature 98.9 F 99.6 F 


 


Pulse Rate 117 H 103 H 81


 


Respiratory 18 18 





Rate   


 


Blood Pressure 165/100 181/89 161/74


 


Blood Pressure   





[Right]   


 


O2 Sat by Pulse 90 90 





Oximetry   














  05/12/21 05/12/21 05/12/21





  11:04 11:05 11:44


 


Temperature   98.4 F


 


Pulse Rate 81  98 H


 


Respiratory  17 17





Rate   


 


Blood Pressure 161/74  


 


Blood Pressure   154/77





[Right]   


 


O2 Sat by Pulse   98





Oximetry   














  05/12/21





  11:46


 


Temperature 


 


Pulse Rate 


 


Respiratory 17





Rate 


 


Blood Pressure 


 


Blood Pressure 





[Right] 


 


O2 Sat by Pulse 98





Oximetry 














- Labs


CBC & Chem 7: 


                                 05/13/21 05:29





                                 05/13/21 05:29


Labs: 


                              Abnormal lab results











  05/11/21 05/11/21 05/11/21 Range/Units





  18:35 18:35 18:35 


 


WBC     (4.5-11.0)  K/mm3


 


RBC  3.48 L    (3.65-5.03)  M/mm3


 


Hgb     (10.1-14.3)  gm/dl


 


Hct     (30.3-42.9)  %


 


RDW  17.7 H    (13.2-15.2)  %


 


Lymph % (Auto)  9.4 L    (13.4-35.0)  %


 


Mono % (Auto)     (0.0-7.3)  %


 


Lymph # (Auto)  0.8 L    (1.2-5.4)  K/mm3


 


Mono # (Auto)     (0.0-0.8)  K/mm3


 


Seg Neutrophils %  81.2 H    (40.0-70.0)  %


 


Seg Neutrophils #     (1.8-7.7)  K/mm3


 


INR     (0.87-1.13)  


 


Heparin Anti-Xa Level     (0.3-0.7)  U.I./ml


 


VBG pH     (7.320-7.420)  


 


Sodium     (137-145)  mmol/L


 


Chloride   87.0 L   ()  mmol/L


 


Carbon Dioxide   35 H   (22-30)  mmol/L


 


BUN   54 H   (7-17)  mg/dL


 


Creatinine   11.3 H   (0.6-1.2)  mg/dL


 


Glucose   111 H   ()  mg/dL


 


Lactic Acid    2.20 H*  (0.7-2.0)  mmol/L


 


Calcium   8.3 L   (8.4-10.2)  mg/dL


 


Magnesium   1.40 L   (1.7-2.3)  mg/dL


 


ALT   5 L   (7-56)  units/L


 


Alkaline Phosphatase   148 H   ()  units/L


 


Troponin T   0.103 H*   (0.00-0.029)  ng/mL


 


NT-Pro-B Natriuret Pep     (0-900)  pg/mL


 


Albumin   3.5 L   (3.9-5)  g/dL














  05/11/21 05/11/21 05/11/21 Range/Units





  18:35 18:35 22:45 


 


WBC     (4.5-11.0)  K/mm3


 


RBC     (3.65-5.03)  M/mm3


 


Hgb     (10.1-14.3)  gm/dl


 


Hct     (30.3-42.9)  %


 


RDW     (13.2-15.2)  %


 


Lymph % (Auto)     (13.4-35.0)  %


 


Mono % (Auto)     (0.0-7.3)  %


 


Lymph # (Auto)     (1.2-5.4)  K/mm3


 


Mono # (Auto)     (0.0-0.8)  K/mm3


 


Seg Neutrophils %     (40.0-70.0)  %


 


Seg Neutrophils #     (1.8-7.7)  K/mm3


 


INR     (0.87-1.13)  


 


Heparin Anti-Xa Level     (0.3-0.7)  U.I./ml


 


VBG pH   7.502 H   (7.320-7.420)  


 


Sodium     (137-145)  mmol/L


 


Chloride     ()  mmol/L


 


Carbon Dioxide     (22-30)  mmol/L


 


BUN     (7-17)  mg/dL


 


Creatinine     (0.6-1.2)  mg/dL


 


Glucose     ()  mg/dL


 


Lactic Acid     (0.7-2.0)  mmol/L


 


Calcium     (8.4-10.2)  mg/dL


 


Magnesium     (1.7-2.3)  mg/dL


 


ALT     (7-56)  units/L


 


Alkaline Phosphatase     ()  units/L


 


Troponin T    0.108 H*  (0.00-0.029)  ng/mL


 


NT-Pro-B Natriuret Pep  5911 H    (0-900)  pg/mL


 


Albumin     (3.9-5)  g/dL














  05/12/21 05/12/21 05/12/21 Range/Units





  00:43 00:43 06:00 


 


WBC  17.4 H   17.3 H  (4.5-11.0)  K/mm3


 


RBC  3.48 L   3.20 L  (3.65-5.03)  M/mm3


 


Hgb    9.3 L  (10.1-14.3)  gm/dl


 


Hct    29.6 L  (30.3-42.9)  %


 


RDW  17.7 H   17.2 H  (13.2-15.2)  %


 


Lymph % (Auto)  3.4 L   4.8 L  (13.4-35.0)  %


 


Mono % (Auto)    8.3 H  (0.0-7.3)  %


 


Lymph # (Auto)  0.6 L   0.8 L  (1.2-5.4)  K/mm3


 


Mono # (Auto)  1.3 H   1.4 H  (0.0-0.8)  K/mm3


 


Seg Neutrophils %  88.6 H   86.3 H  (40.0-70.0)  %


 


Seg Neutrophils #  15.4 H   14.9 H  (1.8-7.7)  K/mm3


 


INR     (0.87-1.13)  


 


Heparin Anti-Xa Level     (0.3-0.7)  U.I./ml


 


VBG pH     (7.320-7.420)  


 


Sodium   146 H   (137-145)  mmol/L


 


Chloride   88.1 L   ()  mmol/L


 


Carbon Dioxide   39 H   (22-30)  mmol/L


 


BUN   54 H   (7-17)  mg/dL


 


Creatinine   11.4 H   (0.6-1.2)  mg/dL


 


Glucose     ()  mg/dL


 


Lactic Acid     (0.7-2.0)  mmol/L


 


Calcium     (8.4-10.2)  mg/dL


 


Magnesium     (1.7-2.3)  mg/dL


 


ALT     (7-56)  units/L


 


Alkaline Phosphatase     ()  units/L


 


Troponin T     (0.00-0.029)  ng/mL


 


NT-Pro-B Natriuret Pep     (0-900)  pg/mL


 


Albumin     (3.9-5)  g/dL














  05/12/21 05/12/21 05/12/21 Range/Units





  06:00 06:00 06:00 


 


WBC     (4.5-11.0)  K/mm3


 


RBC     (3.65-5.03)  M/mm3


 


Hgb     (10.1-14.3)  gm/dl


 


Hct     (30.3-42.9)  %


 


RDW     (13.2-15.2)  %


 


Lymph % (Auto)     (13.4-35.0)  %


 


Mono % (Auto)     (0.0-7.3)  %


 


Lymph # (Auto)     (1.2-5.4)  K/mm3


 


Mono # (Auto)     (0.0-0.8)  K/mm3


 


Seg Neutrophils %     (40.0-70.0)  %


 


Seg Neutrophils #     (1.8-7.7)  K/mm3


 


INR  1.17 H    (0.87-1.13)  


 


Heparin Anti-Xa Level    < 0.10 L  (0.3-0.7)  U.I./ml


 


VBG pH     (7.320-7.420)  


 


Sodium     (137-145)  mmol/L


 


Chloride   89.8 L   ()  mmol/L


 


Carbon Dioxide   39 H   (22-30)  mmol/L


 


BUN   59 H   (7-17)  mg/dL


 


Creatinine   12.9 H   (0.6-1.2)  mg/dL


 


Glucose   101 H   ()  mg/dL


 


Lactic Acid     (0.7-2.0)  mmol/L


 


Calcium   7.7 L   (8.4-10.2)  mg/dL


 


Magnesium     (1.7-2.3)  mg/dL


 


ALT     (7-56)  units/L


 


Alkaline Phosphatase     ()  units/L


 


Troponin T   0.102 H*   (0.00-0.029)  ng/mL


 


NT-Pro-B Natriuret Pep     (0-900)  pg/mL


 


Albumin     (3.9-5)  g/dL














HEART Score





- HEART Score


EKG: Normal


Age: > 65


Risk factors: 1-2 risk factors


Troponin: 


                                        











Troponin T  0.102 ng/mL (0.00-0.029)  H*  05/12/21  06:00    











Troponin: > 3x normal limit





- Critical Actions


Critical Actions: 4-6 pts:12-16.6% risk of adverse cardiac event. Should be 

admitted

## 2021-05-13 LAB
ANISOCYTOSIS BLD QL SMEAR: (no result)
BAND NEUTROPHILS # (MANUAL): 0 K/MM3
BUN SERPL-MCNC: 61 MG/DL (ref 7–17)
BUN/CREAT SERPL: 5 %
CALCIUM SERPL-MCNC: 7.8 MG/DL (ref 8.4–10.2)
HCT VFR BLD CALC: 29.9 % (ref 30.3–42.9)
HEMOLYSIS INDEX: 5
HGB BLD-MCNC: 9.5 GM/DL (ref 10.1–14.3)
MCHC RBC AUTO-ENTMCNC: 32 % (ref 30–34)
MCV RBC AUTO: 95 FL (ref 79–97)
MYELOCYTES # (MANUAL): 0 K/MM3
PLATELET # BLD: 276 K/MM3 (ref 140–440)
PROMYELOCYTES # (MANUAL): 0 K/MM3
RBC # BLD AUTO: 3.14 M/MM3 (ref 3.65–5.03)
TOTAL CELLS COUNTED BLD: 100

## 2021-05-13 RX ADMIN — ONDANSETRON PRN MG: 2 INJECTION INTRAMUSCULAR; INTRAVENOUS at 16:54

## 2021-05-13 RX ADMIN — DOCUSATE SODIUM SCH MG: 100 CAPSULE, LIQUID FILLED ORAL at 13:58

## 2021-05-13 RX ADMIN — CINACALCET HYDROCHLORIDE SCH MG: 30 TABLET, FILM COATED ORAL at 09:15

## 2021-05-13 RX ADMIN — INSULIN LISPRO SCH UNIT: 100 INJECTION, SOLUTION INTRAVENOUS; SUBCUTANEOUS at 16:59

## 2021-05-13 RX ADMIN — SODIUM CHLORIDE, SODIUM LACTATE, CALCIUM CHLORIDE, MAGNESIUM CHLORIDE AND DEXTROSE SCH ML: 1.5; 538; 448; 18.3; 5.08 INJECTION, SOLUTION INTRAPERITONEAL at 11:14

## 2021-05-13 RX ADMIN — CALCIUM ACETATE SCH MG: 667 CAPSULE ORAL at 14:02

## 2021-05-13 RX ADMIN — Medication SCH UNIT: at 09:15

## 2021-05-13 RX ADMIN — DOCUSATE SODIUM SCH: 100 CAPSULE, LIQUID FILLED ORAL at 21:51

## 2021-05-13 RX ADMIN — HEPARIN SODIUM SCH MLS/HR: 5000 INJECTION, SOLUTION INTRAVENOUS at 05:11

## 2021-05-13 RX ADMIN — SODIUM CHLORIDE, SODIUM LACTATE, CALCIUM CHLORIDE, MAGNESIUM CHLORIDE AND DEXTROSE SCH ML: 1.5; 538; 448; 18.3; 5.08 INJECTION, SOLUTION INTRAPERITONEAL at 05:16

## 2021-05-13 RX ADMIN — MORPHINE SULFATE PRN MG: 4 INJECTION, SOLUTION INTRAMUSCULAR; INTRAVENOUS at 19:43

## 2021-05-13 RX ADMIN — NIFEDIPINE SCH: 60 TABLET, EXTENDED RELEASE ORAL at 21:52

## 2021-05-13 RX ADMIN — CEFEPIME SCH MLS/HR: 1 INJECTION, POWDER, FOR SOLUTION INTRAMUSCULAR; INTRAVENOUS at 17:24

## 2021-05-13 RX ADMIN — INSULIN LISPRO SCH: 100 INJECTION, SOLUTION INTRAVENOUS; SUBCUTANEOUS at 21:51

## 2021-05-13 RX ADMIN — POLYETHYLENE GLYCOL 3350 SCH GM: 17 POWDER, FOR SOLUTION ORAL at 13:58

## 2021-05-13 RX ADMIN — Medication SCH ML: at 21:52

## 2021-05-13 RX ADMIN — MORPHINE SULFATE PRN MG: 4 INJECTION, SOLUTION INTRAMUSCULAR; INTRAVENOUS at 05:31

## 2021-05-13 RX ADMIN — INSULIN LISPRO SCH: 100 INJECTION, SOLUTION INTRAVENOUS; SUBCUTANEOUS at 13:58

## 2021-05-13 RX ADMIN — INSULIN GLARGINE SCH: 100 INJECTION, SOLUTION SUBCUTANEOUS at 21:52

## 2021-05-13 RX ADMIN — SODIUM CHLORIDE, SODIUM LACTATE, CALCIUM CHLORIDE, MAGNESIUM CHLORIDE AND DEXTROSE SCH: 1.5; 538; 448; 18.3; 5.08 INJECTION, SOLUTION INTRAPERITONEAL at 16:10

## 2021-05-13 RX ADMIN — CALCITRIOL SCH MCG: 0.5 CAPSULE, LIQUID FILLED ORAL at 09:15

## 2021-05-13 RX ADMIN — ASPIRIN SCH MG: 325 TABLET, COATED ORAL at 09:15

## 2021-05-13 RX ADMIN — METRONIDAZOLE SCH MLS/HR: 5 INJECTION, SOLUTION INTRAVENOUS at 16:10

## 2021-05-13 RX ADMIN — Medication SCH ML: at 09:17

## 2021-05-13 RX ADMIN — CALCIUM ACETATE SCH: 667 CAPSULE ORAL at 21:50

## 2021-05-13 RX ADMIN — CALCIUM ACETATE SCH MG: 667 CAPSULE ORAL at 09:15

## 2021-05-13 RX ADMIN — INSULIN LISPRO SCH: 100 INJECTION, SOLUTION INTRAVENOUS; SUBCUTANEOUS at 09:14

## 2021-05-13 NOTE — CONSULTATION
History of Present Illness





- Reason for Consult


Consult date: 05/13/21


PD associated peritonitis


Requesting physician: KENDRICK LEMOS





- History of Present Illness


70-year-old female with history of diabetes mellitus, hypertension, CHF, ESRD on

peritoneal dialysis, admitted on 5/11/2021 secondary to epigastric abdominal 

pain for 3 days.  Patient initially came to the ED 2 days ago with similar 

complaints.  Abdominal CT was negative.  Abdominal pain is 7 out of 10.  Patient

had PD catheter placed 2 years ago, she has never had any PD catheter infection.

 Patient denies dialysate color changes.  Additionally, patient reports nausea 

vomiting x3.  As well as diarrhea 2-3 times a day.  On arrival, temperature 

98.7--> 101.4, , RR 22, O2 sat 100, /97, initial WBC 8.71-->22.9.  

Lactate 2.2.  Troponin 0 0.103.








Review of Systems: positive in bold print 


General:  fever, chills,  malaise


Cutaneous: rash, pruritus 


Head:  headaches or injury


Eyes:  changes in vision, eye pain, double vision


Ears: ear pain, ear discharge, ringing or hearing loss 


Nose: nose bleeding, stuffiness


Mouth & throat: bleeding gums,  horseness, no dental problems, or swollen glands


Neck: no pain, node enlargement/lumps, tyroid enlargement or tenderness


Respiratory:  SOB, cough, GILLESPIE, wheezing, sputum, hemoptysis, pleuritic chest 

pain


Cardiovascular:  chest pain, leg edema, cyanosis, GILLESPIE, orthopnea


Musculoskeletal:  edema, deformities, pain


Gastrointestinal:  nausea, vomiting, abdominal pain, constipation, melena, 

bright red blood in stools, fecal incontinence, jaundice


Genitourinary/Reproductive: frequent urination, dysuria, hematuria, incontinence


Neurogical:  seizures, headaches,  weakness,  paresthesias,  loss of speech or 

vision;  memory loss, vertigo,   tremors,  numbness


Psychiatric: stable mood;  excessive anxiety, sadness or moodiness


 











Past History


Past Medical History: arthritis, diabetes, dialysis (Peritoneal dialysis), ESRD,

other (Asthma in childhood)


Past Surgical History: appendectomy (In 1985), Other (Tonsillectomy)


Social history: no significant social history


Family history: no significant family history





Medications and Allergies


                                    Allergies











Allergy/AdvReac Type Severity Reaction Status Date / Time


 


digoxin Allergy  Hives Verified 05/11/21 18:22


 


lisinopril Allergy  Swelling Verified 05/11/21 18:22


 


nitroglycerin AdvReac  Rash, Verified 05/11/21 18:22





   HEADACHES  


 


shellfish derived AdvReac  Swelling Verified 05/11/21 18:22











                                Home Medications











 Medication  Instructions  Recorded  Confirmed  Last Taken  Type


 


NIFEdipine XL [Procardia Xl] 60 mg PO HS 08/21/15 05/12/21 10/24/20 History


 


carvediloL [Coreg] 6.25 mg PO BID 08/21/15 05/12/21 10/24/20 History


 


Insulin Detemir (Nf) [Levemir 15 unit SQ QHS PRN 12/09/16 05/12/21 10/25/19 

History





Flextouch (Nf)]     


 


cloNIDine [Catapres] 0.2 mg PO DAILY 12/09/16 05/12/21 10/24/20 History


 


Gentamicin 0.1% Top Oint(Nf) 1 applic TP TID 10/25/20 05/12/21 Unknown History





[Gentamicin 0.1% Top Oint (Nf)]     


 


Cholecalciferol Vit D3 [Vitamin D3 3,000 unit PO DAILY  tablet 10/26/20 05/12/21

 Unknown Rx





1,000 UNIT TAB]     


 


Cinacalcet [Sensipar] 90 mg PO QDAY 05/12/21 05/12/21 Unknown History


 


Hydralazine HCl 50 mg PO TID 05/12/21 05/12/21 Unknown History











Active Meds: 


Active Medications





Acetaminophen (Acetaminophen 325 Mg Tab)  650 mg PO Q4H PRN


   PRN Reason: Pain MILD(1-3)/Fever >100.5/HA


Aspirin (Aspirin Ec 325 Mg Tab)  325 mg PO QDAY [facility]


   Last Admin: 05/13/21 09:15 Dose:  325 mg


   Documented by: 


Bisacodyl (Bisacodyl 5 Mg Tab)  10 mg PO QDAY PRN


   PRN Reason: Constipation


Calcitriol (Calcitriol 0.5 Mcg Cap)  0.5 mcg PO QDAY [facility]


   Last Admin: 05/13/21 09:15 Dose:  0.5 mcg


   Documented by: 


Calcium Acetate (Calcium Acetate 667 Mg Cap)  1,334 mg PO TID [facility]


   Last Admin: 05/13/21 14:02 Dose:  1,334 mg


   Documented by: 


Carvedilol (Carvedilol 6.25 Mg Tab)  6.25 mg PO BID [facility]


   Last Admin: 05/13/21 09:16 Dose:  6.25 mg


   Documented by: 


Cholecalciferol (Cholecalciferol (Vit D3) 1000 Unit (25 Mcg) Tab)  3,000 unit PO

DAILY [facility]


   Last Admin: 05/13/21 09:15 Dose:  3,000 unit


   Documented by: 


Cinacalcet (Cinacalcet 30 Mg Tab)  90 mg PO QDAY [facility]


   Last Admin: 05/13/21 09:15 Dose:  90 mg


   Documented by: 


Clonidine HCl (Clonidine 0.2 Mg Tab)  0.2 mg PO DAILY [facility]


   Last Admin: 05/13/21 09:16 Dose:  0.2 mg


   Documented by: 


Dextrose (Dextrose 50% In Water (25gm) 50 Ml Syringe)  50 ml IV Q30MIN PRN; 

Protocol


   PRN Reason: Hypoglycemia


Docusate Sodium (Docusate Sodium 100 Mg Cap)  100 mg PO BID [facility]


   Last Admin: 05/13/21 13:58 Dose:  100 mg


   Documented by: 


Heparin Sodium (Porcine) (Heparin 10,000 Units/10 Ml Vial)  2,500 unit 40 

unit/kg (2500 unit) IV Q6H PRN


   PRN Reason: Anti-Xa Assay < 0.1 units/ml


Hydralazine HCl (Hydralazine 25 Mg Tab)  50 mg PO Q8HR [facility]


   Last Admin: 05/13/21 14:02 Dose:  50 mg


   Documented by: 


Hydroxyzine HCl (Hydroxyzine Hcl 25 Mg Tab)  25 mg PO Q6H PRN


   PRN Reason: Itching


   Last Admin: 05/13/21 14:50 Dose:  25 mg


   Documented by: 


Sodium Chloride (Nacl 0.9%)  100 mls @ 999 mls/hr IV KYLIE PRN


   PRN Reason: Hypotension


Cefepime HCl (Cefepime/Ns 1 Gm/100 Ml)  1 gm in 100 mls @ 200 mls/hr IV Q24H 

[facility]; Protocol


   Last Admin: 05/12/21 19:06 Dose:  200 mls/hr


   Documented by: 


Insulin Glargine (Insulin Glargine 100 Units/Ml)  15 units SUB-Q QHS [facility]


   Last Admin: 05/12/21 21:26 Dose:  15 units


   Documented by: 


Insulin Human Lispro (Insulin Lispro 100 Unit/Ml)  0 unit SUB-Q ACHS [facility]; 

Protocol


   Last Admin: 05/13/21 13:58 Dose:  Not Given


   Documented by: 


Magnesium Hydroxide (Magnesium Hydroxide (Mom) Oral Liqd Udc)  30 ml PO Q4H PRN


   PRN Reason: Constipation


Morphine Sulfate (Morphine 4 Mg/1 Ml Inj)  2 mg IV Q5MIN PRN


   PRN Reason: Chest Pain


   Last Admin: 05/13/21 05:31 Dose:  2 mg


   Documented by: 


Nifedipine (Nifedipine Xl 60 Mg Tab)  60 mg PO HS [facility]


   Last Admin: 05/12/21 21:25 Dose:  60 mg


   Documented by: 


Ondansetron HCl (Ondansetron 4 Mg/2 Ml Inj)  4 mg IV Q8H PRN


   PRN Reason: Nausea And Vomiting


   Last Admin: 05/12/21 16:28 Dose:  4 mg


   Documented by: 


Peritoneal Dialysis Solution (Dialysate Lo Aneesh 1.5% Soln 2000 Ml)  2,000 ml IP 

Q6H [facility]


   Last Admin: 05/13/21 11:14 Dose:  2,000 ml


   Documented by: 


Polyethylene Glycol (Polyethylene Glycol 3350 17 Gm Powder)  17 gm PO QDAY [facility]


   Last Admin: 05/13/21 13:58 Dose:  17 gm


   Documented by: 


Sodium Chloride (Sodium Chloride 0.9% 10 Ml Flush Syringe)  10 ml IV BID [facility]


   Last Admin: 05/13/21 09:17 Dose:  10 ml


   Documented by: 


Sodium Chloride (Sodium Chloride 0.9% 10 Ml Flush Syringe)  10 ml IV PRN PRN


   PRN Reason: LINE FLUSH


Tramadol HCl (Tramadol 50 Mg Tab)  50 mg PO Q6H PRN


   PRN Reason: Pain, Moderate (4-6)











Physical Examination





- Physical Exam


Narrative exam: 


General appearance: Alert in NAD pleasant


Eyes: anicteric sclerae, moist conjunctivae; no lid-lag; PERRLA 


HENT: Normocephalic, Atraumatic; normal external ears, nares open, oropharynx 

clear 


Neck: supple, tracheal midline, no JVD


Lungs: CTA 


CV: RRR no murmur


Abdomen: Soft, diffuse tenderness, PD catheter exit site without erythema or 

drainage


Extremities: no edema, no cyanosis


Skin: No rash. 


Psych: no agitated


Neuro: alert and oriented x 3. Moving all extermities





 


 





- Constitutional


Vitals: 


                                   Vital Signs











Temp Pulse Resp BP Pulse Ox


 


 98.5 F   78   18   110/75   66 L


 


 05/13/21 08:02  05/13/21 14:02  05/13/21 08:02  05/13/21 14:02  05/13/21 08:04








                           Temperature -Last 24 Hours











Temperature                    98.5 F


 


Temperature                    99.2 F


 


Temperature                    99.8 F


 


Temperature                    101.4 F


 


Temperature                    98.9 F

















Results





- Labs


CBC & Chem 7: 


                                 05/13/21 05:29





                                 05/13/21 05:29


Labs: 


                              Abnormal lab results











  05/12/21 05/12/21 05/13/21 Range/Units





  16:25 20:43 05:29 


 


WBC    22.9 H  (4.5-11.0)  K/mm3


 


RBC    3.14 L  (3.65-5.03)  M/mm3


 


Hgb    9.5 L  (10.1-14.3)  gm/dl


 


Hct    29.9 L  (30.3-42.9)  %


 


RDW    18.0 H  (13.2-15.2)  %


 


Seg Neuts % (Manual)    94.0 H  (40.0-70.0)  %


 


Lymphocytes % (Manual)    2.0 L  (13.4-35.0)  %


 


Seg Neutrophils # Man    21.5 H  (1.8-7.7)  K/mm3


 


Lymphocytes # (Manual)    0.5 L  (1.2-5.4)  K/mm3


 


Basophils # (Manual)    0.2 H  (0.0-0.1)  K/mm3


 


Heparin Anti-Xa Level     (0.3-0.7)  U.I./ml


 


Chloride     ()  mmol/L


 


Carbon Dioxide     (22-30)  mmol/L


 


BUN     (7-17)  mg/dL


 


Creatinine     (0.6-1.2)  mg/dL


 


Glucose     ()  mg/dL


 


POC Glucose  167 H  116 H   ()  mg/dL


 


Calcium     (8.4-10.2)  mg/dL














  05/13/21 05/13/21 05/13/21 Range/Units





  05:29 11:39 13:32 


 


WBC     (4.5-11.0)  K/mm3


 


RBC     (3.65-5.03)  M/mm3


 


Hgb     (10.1-14.3)  gm/dl


 


Hct     (30.3-42.9)  %


 


RDW     (13.2-15.2)  %


 


Seg Neuts % (Manual)     (40.0-70.0)  %


 


Lymphocytes % (Manual)     (13.4-35.0)  %


 


Seg Neutrophils # Man     (1.8-7.7)  K/mm3


 


Lymphocytes # (Manual)     (1.2-5.4)  K/mm3


 


Basophils # (Manual)     (0.0-0.1)  K/mm3


 


Heparin Anti-Xa Level    < 0.10 L  (0.3-0.7)  U.I./ml


 


Chloride  87.1 L    ()  mmol/L


 


Carbon Dioxide  37 H    (22-30)  mmol/L


 


BUN  61 H    (7-17)  mg/dL


 


Creatinine  12.9 H    (0.6-1.2)  mg/dL


 


Glucose  56 L    ()  mg/dL


 


POC Glucose   124 H   ()  mg/dL


 


Calcium  7.8 L    (8.4-10.2)  mg/dL














Assessment and Plan


Cultures:


Blood cultures pending





Assessment: 70-year-old female with history of diabetes mellitus, hypertension, 

CHF, ESRD on peritoneal dialysis, admitted on 5/11/2021 secondary to epigastric 

abdominal pain for 3 days:


 


#SIRS rule out sepsis: present on admission with fever, tachycardia, elevated 

lactate; source unclear, possible PD associated peritonitis vs colitis.





#ESRD on PD





Recommendations:


-Send stool for C. difficile


-Send peritoneal fluid for cell count, differential, Gram stain and culture


-Continue pulse IV vancomycin


-Continue IV cefepime 1 g once a day renally adjusted


-Add Flagyl IV for now


-Follow-up blood cultures





Will follow.





Marisa Lynch MD


Infectious Diseases Consultant 


[facility] ([facility])


M [phone]


O [phone]

## 2021-05-13 NOTE — ELECTROCARDIOGRAPH REPORT
[facility]

                                       

Test Date:    2021               Test Time:    11:53:39

Pat Name:     ANDREW CARSON         Department:   

Patient ID:   [id number]          Room:         A467 1

Gender:       F                        Technician:   RAMÓN

:          1950               Requested By: ANDREZ LAM

Order Number: [id number]              Reading MD:   Sheri Grande

                                 Measurements

Intervals                              Axis          

Rate:         103                      P:            50

IL:           118                      QRS:          1

QRSD:         92                       T:            61

QT:           395                                    

QTc:          517                                    

                           Interpretive Statements

Sinus tachycardia

Prolonged QT interval

Compared to ECG 2021 07:44:55

Electronically Signed On 2021 12:04:51 EDT by Sheri Grande

## 2021-05-13 NOTE — ELECTROCARDIOGRAPH REPORT
[facility]

                                       

Test Date:    2021               Test Time:    07:44:55

Pat Name:     ANDREW CARSON         Department:   

Patient ID:   [id number]          Room:         A467 1

Gender:       F                        Technician:   RAMÓN

:          1950               Requested By: ANDREZ LAM

Order Number: [id number]              Reading MD:   Sheri Grande

                                 Measurements

Intervals                              Axis          

Rate:         99                       P:            62

AL:           124                      QRS:          0

QRSD:         84                       T:            29

QT:           405                                    

QTc:          519                                    

                           Interpretive Statements

Sinus rhythm

Atrial premature complex

Prolonged QT interval

Compared to ECG 2021 18:13:41

Prolonged QT interval now present

Electronically Signed On 2021 12:01:43 EDT by Sheri Grande

## 2021-05-13 NOTE — PROGRESS NOTE
Assessment and Plan


Assessment





End-stage renal disease on PD


Peritonitis?


Leukocytosis


Anemia of ESRD


Hyperparathyroidism


Hyperphosphatemia


Hypertension





Recommendations





Continue manual PD, can bring cycler from home for APD


F/u PD fluid culture


Continue antibiotics


ID consult


Bowel regimen to regulate BM


Continue calcitriol


Continue binder


Continue antihypertensives


Epogen thrice weekly


Renally dose medications


Renal diet








Subjective


Date of service: 05/13/21


Principal diagnosis: Abdominal pain


Interval history: 


Afebrile.  Pain slightly better this morning.  Denies bowel movement.  

Undergoing manual PD.








Objective





- Exam


Narrative Exam: 


General appearance: No acute distress


EENT: Anicteric sclera, hearing intact


Neck: Supple


Respiratory: Normal effort, CTA bilaterally


Cardiovascular: RRR, no rub


Extremities: No edema


Abdominal: Soft, tender


Integumentary: Dry, intact


Musculoskeletal: No joint swelling or erythema


Psychiatric: Appropriate mood/affect, judgment intact


Neurologic: No focal deficits, moves all extremities


PD catheter, site is clean with no erythema or purulent drainage











- Vital Signs


Vital signs: 


                               Vital Signs - 12hr











  05/13/21 05/13/21 05/13/21





  03:25 04:00 05:12


 


Temperature 99.2 F  


 


Pulse Rate 110 H  112 H


 


Respiratory 18  





Rate   


 


Blood Pressure 110/62  110/62


 


O2 Sat by Pulse 67 L 98 





Oximetry   














  05/13/21 05/13/21 05/13/21





  08:02 08:04 09:16


 


Temperature 98.5 F  


 


Pulse Rate 111 H 112 H 111 H


 


Respiratory 18  





Rate   


 


Blood Pressure 128/67  128/67


 


O2 Sat by Pulse 59 L 66 L 





Oximetry   














- Lab





                                 05/13/21 05:29





                                 05/13/21 05:29


                             Most recent lab results











Calcium  7.8 mg/dL (8.4-10.2)  L  05/13/21  05:29    


 


Magnesium  1.40 mg/dL (1.7-2.3)  L  05/11/21  18:35    














Medications & Allergies





- Medications


Allergies/Adverse Reactions: 


                                    Allergies





digoxin Allergy (Verified 05/11/21 18:22)


   Hives


lisinopril Allergy (Verified 05/11/21 18:22)


   Swelling


nitroglycerin Adverse Reaction (Verified 05/11/21 18:22)


   Rash, HEADACHES


shellfish derived Adverse Reaction (Verified 05/11/21 18:22)


   Swelling








Home Medications: 


                                Home Medications











 Medication  Instructions  Recorded  Confirmed  Last Taken  Type


 


NIFEdipine XL [Procardia Xl] 60 mg PO HS 08/21/15 05/12/21 10/24/20 History


 


carvediloL [Coreg] 6.25 mg PO BID 08/21/15 05/12/21 10/24/20 History


 


Insulin Detemir (Nf) [Levemir 15 unit SQ QHS PRN 12/09/16 05/12/21 10/25/19 

History





Flextouch (Nf)]     


 


cloNIDine [Catapres] 0.2 mg PO DAILY 12/09/16 05/12/21 10/24/20 History


 


Gentamicin 0.1% Top Oint(Nf) 1 applic TP TID 10/25/20 05/12/21 Unknown History





[Gentamicin 0.1% Top Oint (Nf)]     


 


Cholecalciferol Vit D3 [Vitamin D3 3,000 unit PO DAILY  tablet 10/26/20 05/12/21

 Unknown Rx





1,000 UNIT TAB]     


 


Cinacalcet [Sensipar] 90 mg PO QDAY 05/12/21 05/12/21 Unknown History


 


Hydralazine HCl 50 mg PO TID 05/12/21 05/12/21 Unknown History











Active Medications: 














Generic Name Dose Route Start Last Admin





  Trade Name Freq  PRN Reason Stop Dose Admin


 


Acetaminophen  650 mg  05/11/21 23:52 





  Acetaminophen 325 Mg Tab  PO  





  Q4H PRN  





  Pain MILD(1-3)/Fever >100.5/HA  


 


Aspirin  325 mg  05/12/21 10:00  05/13/21 09:15





  Aspirin Ec 325 Mg Tab  PO   325 mg





  QDAY ANTONY   Administration


 


Bisacodyl  10 mg  05/13/21 11:33 





  Bisacodyl 5 Mg Tab  PO  





  QDAY PRN  





  Constipation  


 


Calcitriol  0.5 mcg  05/12/21 17:00  05/13/21 09:15





  Calcitriol 0.5 Mcg Cap  PO   0.5 mcg





  QDAY ANTONY   Administration


 


Calcium Acetate  1,334 mg  05/12/21 20:00  05/13/21 09:15





  Calcium Acetate 667 Mg Cap  PO   1,334 mg





  TID ANTONY   Administration


 


Carvedilol  6.25 mg  05/12/21 11:00  05/13/21 09:16





  Carvedilol 6.25 Mg Tab  PO   6.25 mg





  BID ANTONY   Administration


 


Cholecalciferol  3,000 unit  05/12/21 11:00  05/13/21 09:15





  Cholecalciferol (Vit D3) 1000 Unit (25 Mcg) Tab  PO   3,000 unit





  DAILY ANTONY   Administration


 


Cinacalcet  90 mg  05/12/21 11:00  05/13/21 09:15





  Cinacalcet 30 Mg Tab  PO   90 mg





  QDAY ANTONY   Administration


 


Clonidine HCl  0.2 mg  05/12/21 11:00  05/13/21 09:16





  Clonidine 0.2 Mg Tab  PO   0.2 mg





  DAILY ANTONY   Administration


 


Dextrose  50 ml  05/11/21 23:52 





  Dextrose 50% In Water (25gm) 50 Ml Syringe  IV  





  Q30MIN PRN  





  Hypoglycemia  





  Protocol  


 


Docusate Sodium  100 mg  05/13/21 12:00 





  Docusate Sodium 100 Mg Cap  PO  





  BID ANTONY  


 


Heparin Sodium (Porcine)  2,500 unit  05/12/21 00:00 





  Heparin 10,000 Units/10 Ml Vial  40 unit/kg (2500 unit)  





  IV  





  Q6H PRN  





  Anti-Xa Assay < 0.1 units/ml  


 


Hydralazine HCl  50 mg  05/12/21 14:00  05/13/21 05:12





  Hydralazine 25 Mg Tab  PO   50 mg





  Q8HR ANTONY   Administration


 


Sodium Chloride  100 mls @ 999 mls/hr  05/12/21 11:00 





  Nacl 0.9%  IV  





  KYLIE PRN  





  Hypotension  


 


Cefepime HCl  1 gm in 100 mls @ 200 mls/hr  05/12/21 18:00  05/12/21 19:06





  Cefepime/Ns 1 Gm/100 Ml  IV   200 mls/hr





  Q24H ANTONY   Administration





  Protocol  


 


Insulin Glargine  15 units  05/12/21 22:00  05/12/21 21:26





  Insulin Glargine 100 Units/Ml  SUB-Q   15 units





  QHS ANTONY   Administration


 


Insulin Human Lispro  0 unit  05/12/21 07:30  05/13/21 09:14





  Insulin Lispro 100 Unit/Ml  SUB-Q   Not Given





  [facility]  





  Protocol  


 


Magnesium Hydroxide  30 ml  05/11/21 23:52 





  Magnesium Hydroxide (Mom) Oral Liqd Udc  PO  





  Q4H PRN  





  Constipation  


 


Morphine Sulfate  2 mg  05/11/21 23:52  05/13/21 05:31





  Morphine 4 Mg/1 Ml Inj  IV   2 mg





  Q5MIN PRN   Administration





  Chest Pain  


 


Nifedipine  60 mg  05/12/21 22:00  05/12/21 21:25





  Nifedipine Xl 60 Mg Tab  PO   60 mg





  HS ANTONY   Administration


 


Ondansetron HCl  4 mg  05/11/21 23:52  05/12/21 16:28





  Ondansetron 4 Mg/2 Ml Inj  IV   4 mg





  Q8H PRN   Administration





  Nausea And Vomiting  


 


Peritoneal Dialysis Solution  2,000 ml  05/12/21 16:00  05/13/21 11:14





  Dialysate Lo Aneesh 1.5% Soln 2000 Ml  IP   2,000 ml





  Q6H ANTONY   Administration


 


Polyethylene Glycol  17 gm  05/13/21 12:00 





  Polyethylene Glycol 3350 17 Gm Powder  PO  





  QDAY ANTONY  


 


Sodium Chloride  10 ml  05/12/21 10:00  05/13/21 09:17





  Sodium Chloride 0.9% 10 Ml Flush Syringe  IV   10 ml





  BID ANTONY   Administration


 


Sodium Chloride  10 ml  05/11/21 23:52 





  Sodium Chloride 0.9% 10 Ml Flush Syringe  IV  





  PRN PRN  





  LINE FLUSH  


 


Tramadol HCl  50 mg  05/11/21 23:52 





  Tramadol 50 Mg Tab  PO  





  Q6H PRN  





  Pain, Moderate (4-6)

## 2021-05-13 NOTE — PROGRESS NOTE
Assessment and Plan





Chest pain, atypical


   EKG showing normal sinus rhythm, normal ECG. 


   Troponin levels were elevated at 0.1, in the setting of renal disease. 

Unchanged over 3 serial measurements. 


End-stage renal disease, on peritoneal dialysis.


Nausea, vomiting, abdominal pain - chief complaint 


Leukocytosis





Recommend:


Further cardiac evaluation will depend on clinical course. We will consider a 

pre-discharge lexiscan thallium stress test, after patient's possible abdominal 

infection or sepsis is optimally treated.





Subjective


Date of service: 05/13/21


Interval history: 





Patient is resting in comfortably. No cardiac complaints reported.





Objective


                                   Vital Signs











  Temp Pulse Resp BP BP Pulse Ox


 


 05/13/21 09:16   111 H   128/67  


 


 05/13/21 08:04   112 H     66 L


 


 05/13/21 08:02  98.5 F  111 H  18  128/67   59 L


 


 05/13/21 05:12   112 H   110/62  


 


 05/13/21 04:00       98


 


 05/13/21 03:25  99.2 F  110 H  18  110/62   67 L


 


 05/13/21 00:30  99.8 F H     


 


 05/13/21 00:00   116 H  18    99


 


 05/12/21 23:03  101.4 F H  120 H  16  125/58   60 L


 


 05/12/21 21:26   105 H   165/93  


 


 05/12/21 21:25   105 H   165/93  


 


 05/12/21 19:13  98.9 F  101 H  16  165/93   71 L


 


 05/12/21 16:08    14   


 


 05/12/21 16:00   99 H    


 


 05/12/21 15:38    16   


 


 05/12/21 15:00  97.8 F  82  17   123/73  97


 


 05/12/21 13:32   88   145/76  


 


 05/12/21 11:46    17    98


 


 05/12/21 11:44  98.4 F  98 H  17   154/77  98


 


 05/12/21 11:35    16   


 


 05/12/21 11:05    17   


 


 05/12/21 11:04   81   161/74  


 


 05/12/21 11:03   81   161/74  














- Physical Examination


General: No Apparent Distress


HEENT: Positive: PERRL


Neck: Positive: trachea midline


Cardiac: Positive: Reg Rate and Rhythm


Lungs: Positive: Decreased Breath Sounds


Extremities: Absent: edema





- Labs and Meds


                                       CBC











  05/13/21 Range/Units





  05:29 


 


WBC  22.9 H  (4.5-11.0)  K/mm3


 


RBC  3.14 L  (3.65-5.03)  M/mm3


 


Hgb  9.5 L  (10.1-14.3)  gm/dl


 


Hct  29.9 L  (30.3-42.9)  %


 


Plt Count  276  (140-440)  K/mm3








                          Comprehensive Metabolic Panel











  05/13/21 Range/Units





  05:29 


 


Sodium  142  (137-145)  mmol/L


 


Potassium  4.3  (3.6-5.0)  mmol/L


 


Chloride  87.1 L  ()  mmol/L


 


Carbon Dioxide  37 H  (22-30)  mmol/L


 


BUN  61 H  (7-17)  mg/dL


 


Creatinine  12.9 H  (0.6-1.2)  mg/dL


 


Glucose  56 L  ()  mg/dL


 


Calcium  7.8 L  (8.4-10.2)  mg/dL

## 2021-05-13 NOTE — ELECTROCARDIOGRAPH REPORT
[facility]

                                       

Test Date:    2021               Test Time:    18:13:41

Pat Name:     ANDREW CARSON         Department:   

Patient ID:   [id number]          Room:         A467 1

Gender:       F                        Technician:   TV

:          1950               Requested By: ALBERTINA GATES

Order Number: [id number]              Reading MD:   Sheri Grande

                                 Measurements

Intervals                              Axis          

Rate:         89                       P:            91

CT:           120                      QRS:          1

QRSD:         94                       T:            35

QT:           402                                    

QTc:          492                                    

                           Interpretive Statements

Sinus rhythm

Atrial premature complex

No previous ECG available for comparison

Electronically Signed On 2021 11:51:38 EDT by Sheri Grande

## 2021-05-13 NOTE — PROGRESS NOTE
Assessment and Plan





-- Acute chest pain, likely atypical


Patient placed on telemetry.


Follow serial cardiac enzymes.


Patient placed on aspirin, sublingual nitroglycerin and IV morphine as needed 

for chest pain.


Medical management per cardiology





--NSTEMI type II


Troponin has been elevated probably secondary to the end-stage renal disease 


however patient had placed on heparin drip prior to cardiology evaluation and 

recommendation.


Medical management per cardiology, follow 2D echocardiogram


Stop heparin drip now





-- Abdominal pain, likely from peritonitis


Etiology unclear.  CT of the abdomen and pelvis has been negative.


Will place on analgesic medications as needed. cont iv abx for possible 

peritonitis


Ordered for peritoneal fluid study





-- End-stage renal disease on peritoneal dialysis


Patient on peritoneal dialysis.  Consult placed to nephrology for evaluation.





-- Hypertension 


Continue routine home medications and monitor vital signs closely.





-- DVT prophylaxis


Patient currently on anticoagulation.





-- Full code status


Patient is a full code.





Daily clinical course: 


5/12/21: start on empiric abx for possible peritonitis. will follow peritoneal 

fluid cx report.  Continue to trend troponin, continue heparin drip, follow 

cardiology recommendation.





5/13/21: Patient will fluid study pending, IV antibiotics changed to cefepime.  

ID has been consulted.  Patient continued to complains of abdominal pain.  

Cardiology recommended medical management for elevated troponin - will DC 

heparin drip.  Follow 2D echo result and pending peritoneal fluid study.











Subjective


Date of service: 05/13/21


Principal diagnosis: Abdominal pain


Interval history: 





Patient seen and examined.  Medical records and medication list reviewed.  


No acute event overnight noted by the RN.  


Patient continued to complains of diffuse abdominal pain with persistent 

elevated white count


had peritoneal dialysis yesterday but could not complete due to abdominal pain


Plan for repeat beta and dialysis today -peritoneal fluid culture study pending


Discussed plan of care at bedside with patient.








Objective





- Exam


Narrative Exam: 





GENERAL:  well-developed -American female lying on bed appeared to be in 

no discomfort. 


HEENT: Normocephalic.  Atraumatic.  No conjunctival congestion or icterus. 

Patient has moist mucous membranes.


NECK: Supple.  Trachea midline.


CHEST/LUNGS: Clear to auscultated bilaterally, breathing nonlabored. No wheezes 

crackles or rhonchi.


HEART/CARDIOVASCULAR: Regular in rate and rhythm.  S1 and S2 positive.


ABDOMEN: Abdomen is soft but diffusely tender.  Patient has normal bowel sounds.

 


SKIN: There is no rash.  Warm and dry.


NEURO:  No focal motor deficit.  Follows command.


MUSCULOSKELETAL: No joint effusion or tenderness.


EXTRIMITY: No edema, no cyanosis or clubbing.


PSYCH:  Cooperative.








- Constitutional


Vitals: 


                               Vital Signs - 12hr











  05/13/21 05/13/21 05/13/21





  03:25 04:00 05:12


 


Temperature 99.2 F  


 


Pulse Rate 110 H  112 H


 


Respiratory 18  





Rate   


 


Blood Pressure 110/62  110/62


 


O2 Sat by Pulse 67 L 98 





Oximetry   














  05/13/21 05/13/21 05/13/21





  08:02 08:04 09:16


 


Temperature 98.5 F  


 


Pulse Rate 111 H 112 H 111 H


 


Respiratory 18  





Rate   


 


Blood Pressure 128/67  128/67


 


O2 Sat by Pulse 59 L 66 L 





Oximetry   














  05/13/21





  14:02


 


Temperature 


 


Pulse Rate 78


 


Respiratory 





Rate 


 


Blood Pressure 110/75


 


O2 Sat by Pulse 





Oximetry 














- Labs


CBC & Chem 7: 


                                 05/14/21 04:58





                                 05/13/21 05:29


Labs: 


                              Abnormal lab results











  05/12/21 05/12/21 05/12/21 Range/Units





  14:00 16:25 20:43 


 


WBC     (4.5-11.0)  K/mm3


 


RBC     (3.65-5.03)  M/mm3


 


Hgb     (10.1-14.3)  gm/dl


 


Hct     (30.3-42.9)  %


 


RDW     (13.2-15.2)  %


 


Seg Neuts % (Manual)     (40.0-70.0)  %


 


Lymphocytes % (Manual)     (13.4-35.0)  %


 


Seg Neutrophils # Man     (1.8-7.7)  K/mm3


 


Lymphocytes # (Manual)     (1.2-5.4)  K/mm3


 


Basophils # (Manual)     (0.0-0.1)  K/mm3


 


Heparin Anti-Xa Level  0.14 L    (0.3-0.7)  U.I./ml


 


Chloride     ()  mmol/L


 


Carbon Dioxide     (22-30)  mmol/L


 


BUN     (7-17)  mg/dL


 


Creatinine     (0.6-1.2)  mg/dL


 


Glucose     ()  mg/dL


 


POC Glucose   167 H  116 H  ()  mg/dL


 


Calcium     (8.4-10.2)  mg/dL














  05/13/21 05/13/21 05/13/21 Range/Units





  05:29 05:29 11:39 


 


WBC  22.9 H    (4.5-11.0)  K/mm3


 


RBC  3.14 L    (3.65-5.03)  M/mm3


 


Hgb  9.5 L    (10.1-14.3)  gm/dl


 


Hct  29.9 L    (30.3-42.9)  %


 


RDW  18.0 H    (13.2-15.2)  %


 


Seg Neuts % (Manual)  94.0 H    (40.0-70.0)  %


 


Lymphocytes % (Manual)  2.0 L    (13.4-35.0)  %


 


Seg Neutrophils # Man  21.5 H    (1.8-7.7)  K/mm3


 


Lymphocytes # (Manual)  0.5 L    (1.2-5.4)  K/mm3


 


Basophils # (Manual)  0.2 H    (0.0-0.1)  K/mm3


 


Heparin Anti-Xa Level     (0.3-0.7)  U.I./ml


 


Chloride   87.1 L   ()  mmol/L


 


Carbon Dioxide   37 H   (22-30)  mmol/L


 


BUN   61 H   (7-17)  mg/dL


 


Creatinine   12.9 H   (0.6-1.2)  mg/dL


 


Glucose   56 L   ()  mg/dL


 


POC Glucose    124 H  ()  mg/dL


 


Calcium   7.8 L   (8.4-10.2)  mg/dL














  05/13/21 Range/Units





  13:32 


 


WBC   (4.5-11.0)  K/mm3


 


RBC   (3.65-5.03)  M/mm3


 


Hgb   (10.1-14.3)  gm/dl


 


Hct   (30.3-42.9)  %


 


RDW   (13.2-15.2)  %


 


Seg Neuts % (Manual)   (40.0-70.0)  %


 


Lymphocytes % (Manual)   (13.4-35.0)  %


 


Seg Neutrophils # Man   (1.8-7.7)  K/mm3


 


Lymphocytes # (Manual)   (1.2-5.4)  K/mm3


 


Basophils # (Manual)   (0.0-0.1)  K/mm3


 


Heparin Anti-Xa Level  < 0.10 L  (0.3-0.7)  U.I./ml


 


Chloride   ()  mmol/L


 


Carbon Dioxide   (22-30)  mmol/L


 


BUN   (7-17)  mg/dL


 


Creatinine   (0.6-1.2)  mg/dL


 


Glucose   ()  mg/dL


 


POC Glucose   ()  mg/dL


 


Calcium   (8.4-10.2)  mg/dL














HEART Score





- HEART Score


EKG: Normal


Age: > 65


Risk factors: 1-2 risk factors


Troponin: 


                                        











Troponin T  0.102 ng/mL (0.00-0.029)  H*  05/12/21  06:00    











Troponin: > 3x normal limit





- Critical Actions


Critical Actions: 4-6 pts:12-16.6% risk of adverse cardiac event. Should be 

admitted

## 2021-05-14 LAB
BUN SERPL-MCNC: 52 MG/DL (ref 7–17)
BUN/CREAT SERPL: 5 %
CALCIUM SERPL-MCNC: 7 MG/DL (ref 8.4–10.2)
HCT VFR BLD CALC: 29.1 % (ref 30.3–42.9)
HEMOLYSIS INDEX: 3
HGB BLD-MCNC: 8.9 GM/DL (ref 10.1–14.3)
PLATELET # BLD: 252 K/MM3 (ref 140–440)

## 2021-05-14 RX ADMIN — ASPIRIN SCH MG: 325 TABLET, COATED ORAL at 08:59

## 2021-05-14 RX ADMIN — Medication SCH UNIT: at 09:00

## 2021-05-14 RX ADMIN — CEFEPIME SCH MLS/HR: 1 INJECTION, POWDER, FOR SOLUTION INTRAMUSCULAR; INTRAVENOUS at 17:01

## 2021-05-14 RX ADMIN — INSULIN LISPRO SCH: 100 INJECTION, SOLUTION INTRAVENOUS; SUBCUTANEOUS at 16:51

## 2021-05-14 RX ADMIN — SODIUM CHLORIDE, SODIUM LACTATE, CALCIUM CHLORIDE, MAGNESIUM CHLORIDE AND DEXTROSE SCH: 1.5; 538; 448; 18.3; 5.08 INJECTION, SOLUTION INTRAPERITONEAL at 06:38

## 2021-05-14 RX ADMIN — POLYETHYLENE GLYCOL 3350 SCH GM: 17 POWDER, FOR SOLUTION ORAL at 08:59

## 2021-05-14 RX ADMIN — INSULIN GLARGINE SCH UNITS: 100 INJECTION, SOLUTION SUBCUTANEOUS at 21:55

## 2021-05-14 RX ADMIN — CALCIUM ACETATE SCH: 667 CAPSULE ORAL at 11:17

## 2021-05-14 RX ADMIN — METRONIDAZOLE SCH MLS/HR: 5 INJECTION, SOLUTION INTRAVENOUS at 00:00

## 2021-05-14 RX ADMIN — NIFEDIPINE SCH: 60 TABLET, EXTENDED RELEASE ORAL at 21:57

## 2021-05-14 RX ADMIN — MORPHINE SULFATE PRN MG: 4 INJECTION, SOLUTION INTRAMUSCULAR; INTRAVENOUS at 23:49

## 2021-05-14 RX ADMIN — CALCIUM ACETATE SCH MG: 667 CAPSULE ORAL at 17:01

## 2021-05-14 RX ADMIN — MORPHINE SULFATE PRN MG: 4 INJECTION, SOLUTION INTRAMUSCULAR; INTRAVENOUS at 17:03

## 2021-05-14 RX ADMIN — DOCUSATE SODIUM SCH MG: 100 CAPSULE, LIQUID FILLED ORAL at 08:59

## 2021-05-14 RX ADMIN — INSULIN LISPRO SCH UNIT: 100 INJECTION, SOLUTION INTRAVENOUS; SUBCUTANEOUS at 21:56

## 2021-05-14 RX ADMIN — DOCUSATE SODIUM SCH MG: 100 CAPSULE, LIQUID FILLED ORAL at 21:54

## 2021-05-14 RX ADMIN — SODIUM CHLORIDE, SODIUM LACTATE, CALCIUM CHLORIDE, MAGNESIUM CHLORIDE AND DEXTROSE SCH: 1.5; 538; 448; 18.3; 5.08 INJECTION, SOLUTION INTRAPERITONEAL at 09:09

## 2021-05-14 RX ADMIN — Medication SCH ML: at 09:00

## 2021-05-14 RX ADMIN — CALCIUM ACETATE SCH MG: 667 CAPSULE ORAL at 08:54

## 2021-05-14 RX ADMIN — CINACALCET HYDROCHLORIDE SCH MG: 30 TABLET, FILM COATED ORAL at 09:03

## 2021-05-14 RX ADMIN — METRONIDAZOLE SCH MLS/HR: 5 INJECTION, SOLUTION INTRAVENOUS at 08:53

## 2021-05-14 RX ADMIN — INSULIN LISPRO SCH: 100 INJECTION, SOLUTION INTRAVENOUS; SUBCUTANEOUS at 12:34

## 2021-05-14 RX ADMIN — CALCITRIOL SCH MCG: 0.5 CAPSULE, LIQUID FILLED ORAL at 08:59

## 2021-05-14 RX ADMIN — INSULIN LISPRO SCH: 100 INJECTION, SOLUTION INTRAVENOUS; SUBCUTANEOUS at 08:53

## 2021-05-14 RX ADMIN — SODIUM CHLORIDE, SODIUM LACTATE, CALCIUM CHLORIDE, MAGNESIUM CHLORIDE AND DEXTROSE SCH: 1.5; 538; 448; 18.3; 5.08 INJECTION, SOLUTION INTRAPERITONEAL at 16:51

## 2021-05-14 RX ADMIN — Medication SCH ML: at 21:57

## 2021-05-14 NOTE — PROGRESS NOTE
Assessment and Plan


Assessment





End-stage renal disease on PD


Peritonitis?


Leukocytosis


Anemia of ESRD


Hyperparathyroidism


Hyperphosphatemia


Hypertension





Recommendations





Continue APD


F/u PD fluid culture, cell count


Continue antibiotics


ID note reviewed


Bowel regimen to regulate BM


Continue calcitriol


Continue binder


Continue antihypertensives


Epogen thrice weekly


Renally dose medications


Renal diet








Subjective


Date of service: 05/14/21


Principal diagnosis: Abdominal pain


Interval history: 


Undergoing PD on cycler. Continues to experience abdominal pain.








Objective





- Exam


Narrative Exam: 


General appearance: No acute distress


EENT: Anicteric sclera, hearing intact


Neck: Supple


Respiratory: Normal effort, CTA bilaterally


Cardiovascular: RRR, no rub


Extremities: No edema


Abdominal: Soft, tender


Integumentary: Dry, intact


Musculoskeletal: No joint swelling or erythema


Psychiatric: Appropriate mood/affect, judgment intact


Neurologic: No focal deficits, moves all extremities


PD catheter, site is clean with no erythema or purulent drainage











- Vital Signs


Vital signs: 


                               Vital Signs - 12hr











  05/14/21 05/14/21 05/14/21





  12:00 16:40 16:52


 


Temperature   


 


Pulse Rate  82 76


 


Respiratory 18  





Rate   


 


Blood Pressure   92/47


 


O2 Sat by Pulse 95  





Oximetry   














  05/14/21





  20:24


 


Temperature 98.2 F


 


Pulse Rate 95 H


 


Respiratory 18





Rate 


 


Blood Pressure 116/75


 


O2 Sat by Pulse 94





Oximetry 














- Lab





                                 05/14/21 04:58





                                 05/14/21 13:46


                             Most recent lab results











Calcium  7.0 mg/dL (8.4-10.2)  L  05/14/21  13:46    


 


Magnesium  1.40 mg/dL (1.7-2.3)  L  05/11/21  18:35    














Medications & Allergies





- Medications


Allergies/Adverse Reactions: 


                                    Allergies





digoxin Allergy (Verified 05/11/21 18:22)


   Hives


lisinopril Allergy (Verified 05/11/21 18:22)


   Swelling


nitroglycerin Adverse Reaction (Verified 05/11/21 18:22)


   Rash, HEADACHES


shellfish derived Adverse Reaction (Verified 05/11/21 18:22)


   Swelling








Home Medications: 


                                Home Medications











 Medication  Instructions  Recorded  Confirmed  Last Taken  Type


 


NIFEdipine XL [Procardia Xl] 60 mg PO HS 08/21/15 05/12/21 10/24/20 History


 


carvediloL [Coreg] 6.25 mg PO BID 08/21/15 05/12/21 10/24/20 History


 


Insulin Detemir (Nf) [Levemir 15 unit SQ QHS PRN 12/09/16 05/12/21 10/25/19 

History





Flextouch (Nf)]     


 


cloNIDine [Catapres] 0.2 mg PO DAILY 12/09/16 05/12/21 10/24/20 History


 


Gentamicin 0.1% Top Oint(Nf) 1 applic TP TID 10/25/20 05/12/21 Unknown History





[Gentamicin 0.1% Top Oint (Nf)]     


 


Cholecalciferol Vit D3 [Vitamin D3 3,000 unit PO DAILY  tablet 10/26/20 05/12/21

 Unknown Rx





1,000 UNIT TAB]     


 


Cinacalcet [Sensipar] 90 mg PO QDAY 05/12/21 05/12/21 Unknown History


 


Hydralazine HCl 50 mg PO TID 05/12/21 05/12/21 Unknown History











Active Medications: 














Generic Name Dose Route Start Last Admin





  Trade Name Freq  PRN Reason Stop Dose Admin


 


Acetaminophen  650 mg  05/11/21 23:52 





  Acetaminophen 325 Mg Tab  PO  





  Q4H PRN  





  Pain MILD(1-3)/Fever >100.5/HA  


 


Albuterol  2.5 mg  05/14/21 12:24 





  Albuterol 2.5 Mg/3 Ml Nebu  IH  





  Q4HRT PRN  





  Shortness Of Breath  


 


Aspirin  325 mg  05/12/21 10:00  05/14/21 08:59





  Aspirin Ec 325 Mg Tab  PO   325 mg





  QDAY ANTONY   Administration


 


Bisacodyl  10 mg  05/13/21 11:33  05/14/21 22:08





  Bisacodyl 5 Mg Tab  PO   10 mg





  QDAY PRN   Administration





  Constipation  


 


Calcitriol  0.5 mcg  05/12/21 17:00  05/14/21 08:59





  Calcitriol 0.5 Mcg Cap  PO   0.5 mcg





  QDAY ANTONY   Administration


 


Calcium Acetate  1,334 mg  05/14/21 17:00  05/14/21 17:01





  Calcium Acetate 667 Mg Cap  PO   1,334 mg





  TIDWM ANTONY   Administration


 


Carvedilol  6.25 mg  05/12/21 11:00  05/14/21 21:57





  Carvedilol 6.25 Mg Tab  PO   Not Given





  BID ANTONY  


 


Cholecalciferol  3,000 unit  05/12/21 11:00  05/14/21 09:00





  Cholecalciferol (Vit D3) 1000 Unit (25 Mcg) Tab  PO   3,000 unit





  DAILY ANTONY   Administration


 


Cinacalcet  90 mg  05/12/21 11:00  05/14/21 09:03





  Cinacalcet 30 Mg Tab  PO   90 mg





  QDAY ANTONY   Administration


 


Clonidine HCl  0.2 mg  05/12/21 11:00  05/14/21 08:59





  Clonidine 0.2 Mg Tab  PO   0.2 mg





  DAILY ANTONY   Administration


 


Dextrose  50 ml  05/11/21 23:52 





  Dextrose 50% In Water (25gm) 50 Ml Syringe  IV  





  Q30MIN PRN  





  Hypoglycemia  





  Protocol  


 


Docusate Sodium  100 mg  05/13/21 12:00  05/14/21 21:54





  Docusate Sodium 100 Mg Cap  PO   100 mg





  BID ANTONY   Administration


 


Hydralazine HCl  50 mg  05/12/21 14:00  05/14/21 21:57





  Hydralazine 25 Mg Tab  PO   Not Given





  Q8HR [facility]  


 


Hydroxyzine HCl  25 mg  05/13/21 14:05  05/14/21 00:00





  Hydroxyzine Hcl 25 Mg Tab  PO   25 mg





  Q6H PRN   Administration





  Itching  


 


Sodium Chloride  100 mls @ 999 mls/hr  05/12/21 11:00 





  Nacl 0.9%  IV  





  KYLIE PRN  





  Hypotension  


 


Cefepime HCl  1 gm in 100 mls @ 200 mls/hr  05/12/21 18:00  05/14/21 17:01





  Cefepime/Ns 1 Gm/100 Ml  IV   200 mls/hr





  Q24H ANTONY   Administration





  Protocol  


 


Vancomycin HCl  1 gm in 250 mls @ 167.007 mls/hr  05/14/21 22:00  05/14/21 21:54





  Vancomycin/Ns 1 Gm/250 Ml  IV  05/14/21 23:29  167.007 mls/hr





  ONCE ONE   Administration


 


Insulin Glargine  15 units  05/12/21 22:00  05/14/21 21:55





  Insulin Glargine 100 Units/Ml  SUB-Q   15 units





  QHS ANTONY   Administration


 


Insulin Human Lispro  0 unit  05/12/21 07:30  05/14/21 21:56





  Insulin Lispro 100 Unit/Ml  SUB-Q   3 unit





  ACHS ANTONY   Administration





  Protocol  


 


Magnesium Hydroxide  30 ml  05/11/21 23:52 





  Magnesium Hydroxide (Mom) Oral Liqd Udc  PO  





  Q4H PRN  





  Constipation  


 


Morphine Sulfate  2 mg  05/11/21 23:52  05/14/21 17:03





  Morphine 4 Mg/1 Ml Inj  IV   2 mg





  Q5MIN PRN   Administration





  Chest Pain  


 


Nifedipine  60 mg  05/12/21 22:00  05/14/21 21:57





  Nifedipine Xl 60 Mg Tab  PO   Not Given





  HS [facility]  


 


Ondansetron HCl  4 mg  05/11/21 23:52  05/13/21 16:54





  Ondansetron 4 Mg/2 Ml Inj  IV   4 mg





  Q8H PRN   Administration





  Nausea And Vomiting  


 


Peritoneal Dialysis Solution  2,000 ml  05/12/21 16:00  05/14/21 16:51





  Dialysate Lo Aneesh 1.5% Soln 2000 Ml  IP   Not Given





  Q6H ANTONY  


 


Polyethylene Glycol  17 gm  05/13/21 12:00  05/14/21 08:59





  Polyethylene Glycol 3350 17 Gm Powder  PO   17 gm





  QDAY ANTONY   Administration


 


Sodium Chloride  10 ml  05/12/21 10:00  05/14/21 21:57





  Sodium Chloride 0.9% 10 Ml Flush Syringe  IV   10 ml





  BID ANTONY   Administration


 


Sodium Chloride  10 ml  05/11/21 23:52 





  Sodium Chloride 0.9% 10 Ml Flush Syringe  IV  





  PRN PRN  





  LINE FLUSH  


 


Tramadol HCl  50 mg  05/11/21 23:52 





  Tramadol 50 Mg Tab  PO  





  Q6H PRN  





  Pain, Moderate (4-6)

## 2021-05-14 NOTE — PROGRESS NOTE
Assessment and Plan


-- acute hypoxic respiratory failure


Patient noted hypoxic this am with O2 sat 77% at RA


repeat CXR, follow clinically


she completed her COVID vaccine - so doubt COVID infection 





-- Acute chest pain, likely atypical


Patient placed on telemetry.


Follow serial cardiac enzymes.


Patient placed on aspirin, sublingual nitroglycerin and IV morphine as needed 

for chest pain.


Medical management per cardiology





--NSTEMI type II


Troponin has been elevated probably secondary to the end-stage renal disease 


however patient had placed on heparin drip prior to cardiology evaluation and 

recommendation.


Medical management per cardiology, follow 2D echocardiogram


Stop heparin drip now





-- Abdominal pain, likely from peritonitis


Etiology unclear.  CT of the abdomen and pelvis has been negative.


cont on analgesic medications as needed. cont iv abx for possible peritonitis


Ordered for peritoneal fluid study





-- End-stage renal disease on peritoneal dialysis


Patient on peritoneal dialysis.  Consult placed to nephrology for evaluation.





-- Hypertension 


Continue routine home medications and monitor vital signs closely.





-- DVT prophylaxis


Patient currently on anticoagulation.





-- Full code status


Patient is a full code.





Daily clinical course: 


5/12/21: start on empiric abx for possible peritonitis. will follow peritoneal 

fluid cx report.  Continue to trend troponin, continue heparin drip, follow 

cardiology recommendation.





5/13/21: Patient will fluid study pending, IV antibiotics changed to cefepime.  

ID has been consulted.  Patient continued to complains of abdominal pain.  

Cardiology recommended medical management for elevated troponin - will DC 

heparin drip.  Follow 2D echo result and pending peritoneal fluid study.





5/14: cont abx for peritonitis, patient still has diffuse abd pain. noted 

hypoxic this am, order repeat CXR. order nebs 











Subjective


Date of service: 05/14/21


Principal diagnosis: Abdominal pain


Interval history: 





Patient seen and examined.  Medical records and medication list reviewed.  


No acute event overnight noted by the RN.  


Patient continued to complains of abdominal pain but states that slightly 

improved


Patient noted to be severely hypoxic this morning O2 sat dropping to 77% on room

air


Discussed plan of care at bedside with patient.








Objective





- Exam


Narrative Exam: 





GENERAL:  well-developed -American female lying on bed appeared to be in 

no discomfort. 


HEENT: Normocephalic.  Atraumatic.  No conjunctival congestion or icterus. 

Patient has moist mucous membranes.


NECK: Supple.  Trachea midline.


CHEST/LUNGS: Diminished breath sound auscultated bilaterally, patient on 

supplemental O2


HEART/CARDIOVASCULAR: Regular in rate and rhythm.  S1 and S2 positive.


ABDOMEN: Abdomen is soft but diffusely tender.  Patient has normal bowel sounds.

 


SKIN: There is no rash.  Warm and dry.


NEURO:  No focal motor deficit.  Follows command.


MUSCULOSKELETAL: No joint effusion or tenderness.


EXTRIMITY: No edema, no cyanosis or clubbing.


PSYCH:  Cooperative.








- Constitutional


Vitals: 


                               Vital Signs - 12hr











  05/14/21 05/14/21 05/14/21





  04:07 08:57 08:59


 


Temperature 98.2 F 98.3 F 


 


Pulse Rate 96 H 104 H 82


 


Respiratory 16 18 





Rate   


 


Blood Pressure 119/53 135/72 135/72


 


O2 Sat by Pulse 77 L 95 





Oximetry   














  05/14/21





  09:00


 


Temperature 


 


Pulse Rate 82


 


Respiratory 





Rate 


 


Blood Pressure 135/72


 


O2 Sat by Pulse 





Oximetry 














- Labs


CBC & Chem 7: 


                                 05/16/21 05:16





                                 05/15/21 04:31


Labs: 


                              Abnormal lab results











  05/13/21 05/13/21 05/13/21 Range/Units





  13:32 16:43 20:55 


 


Hgb     (10.1-14.3)  gm/dl


 


Hct     (30.3-42.9)  %


 


Heparin Anti-Xa Level  < 0.10 L    (0.3-0.7)  U.I./ml


 


POC Glucose   216 H  141 H  ()  mg/dL














  05/14/21 Range/Units





  04:58 


 


Hgb  8.9 L  (10.1-14.3)  gm/dl


 


Hct  29.1 L  (30.3-42.9)  %


 


Heparin Anti-Xa Level   (0.3-0.7)  U.I./ml


 


POC Glucose   ()  mg/dL














HEART Score





- HEART Score


EKG: Normal


Age: > 65


Risk factors: 1-2 risk factors


Troponin: 


                                        











Troponin T  0.102 ng/mL (0.00-0.029)  H*  05/12/21  06:00    











Troponin: > 3x normal limit





- Critical Actions


Critical Actions: 4-6 pts:12-16.6% risk of adverse cardiac event. Should be 

admitted

## 2021-05-14 NOTE — XRAY REPORT
. XR chest 1V ap



INDICATION / CLINICAL INFORMATION:

SOB. 



COMPARISON: 

5/11/2021



FINDINGS:



SUPPORT DEVICES: None.



HEART / MEDIASTINUM: Unchanged. 



LUNGS / PLEURA: Right costophrenic sulcal blunting. Right basilar atelectasis. No pneumothorax.



ADDITIONAL FINDINGS: No significant additional findings.



IMPRESSION:

1. Moderate right pleural effusion.



 Signer Name: Oskar Carver MD 

Signed: 5/14/2021 1:09 PM

Workstation Name: [id number]

## 2021-05-14 NOTE — PROGRESS NOTE
Assessment and Plan


Cultures:


Blood cultures pending





Assessment: 70-year-old female with history of diabetes mellitus, hypertension, 

CHF, ESRD on peritoneal dialysis, admitted on 5/11/2021 secondary to epigastric 

abdominal pain for 3 days:


 


#SIRS rule out sepsis: Remains with leukocytosis; source unclear, possible PD 

associated peritonitis vs colitis.





#Abdominal pain: Suspect PD associated peritonitis.  Peritoneal fluid Gram stain

with multiple WBCs no growth so far.  Peritoneal fluid cell count and 

differential not done 





#Diarrhea: Resolved.





#ESRD on PD





Recommendations:


-Cancel C. difficile diarrhea resolved


-Send peritoneal fluid for cell count, differential, Gram stain and culture, 

ordered cell count and differential today


-Continue pulse IV vancomycin


-Continue IV cefepime 1 g once a day renally adjusted


-Stop Flagyl


-Follow-up blood cultures





Will follow.





Marisa Lynch MD


Infectious Diseases Consultant 


[facility] ([facility])


M [phone]


O [phone]


 





Subjective


Date of service: 05/14/21


Principal diagnosis: Abdominal pain


Interval history: 


Continues to complain of abdominal pain.  No fever.








Objective





- Exam


Narrative Exam: 


General appearance: Alert in NAD pleasant


Eyes: anicteric sclerae, moist conjunctivae; no lid-lag; PERRLA 


HENT: Normocephalic, Atraumatic; normal external ears, nares open, oropharynx 

clear 


Neck: supple, tracheal midline, no JVD


Lungs: CTA 


CV: RRR no murmur


Abdomen: Soft, diffuse tenderness, PD catheter exit site without erythema or 

drainage


Extremities: no edema, no cyanosis


Skin: No rash. 


Psych: no agitated


Neuro: alert and oriented x 3. Moving all extermities





 


 





- Constitutional


Vitals: 


                                   Vital Signs











Temp Pulse Resp BP Pulse Ox


 


 98.3 F   82   18   135/72   95 


 


 05/14/21 08:57  05/14/21 09:00  05/14/21 08:57  05/14/21 09:00  05/14/21 08:57








                           Temperature -Last 24 Hours











Temperature                    98.3 F


 


Temperature                    98.2 F


 


Temperature                    97.3 F


 


Temperature                    98.5 F


 


Temperature                    97.8 F

















- Labs


CBC & Chem 7: 


                                 05/14/21 04:58





                                 05/13/21 05:29


Labs: 


                              Abnormal lab results











  05/13/21 05/13/21 05/13/21 Range/Units





  13:32 16:43 20:55 


 


Hgb     (10.1-14.3)  gm/dl


 


Hct     (30.3-42.9)  %


 


Heparin Anti-Xa Level  < 0.10 L    (0.3-0.7)  U.I./ml


 


POC Glucose   216 H  141 H  ()  mg/dL














  05/14/21 Range/Units





  04:58 


 


Hgb  8.9 L  (10.1-14.3)  gm/dl


 


Hct  29.1 L  (30.3-42.9)  %


 


Heparin Anti-Xa Level   (0.3-0.7)  U.I./ml


 


POC Glucose   ()  mg/dL

## 2021-05-14 NOTE — PROGRESS NOTE
Assessment and Plan





Chest pain, atypical


   EKG showing normal sinus rhythm, normal ECG. 


   Troponin levels were elevated at 0.1, in the setting of renal disease. 

Unchanged over 3 serial measurements. 


   Echo this presentations demonstrates normal LVEF. There is mild to moderate 

TR with at least moderate pulmonary HTN.


End-stage renal disease, on peritoneal dialysis.


Nausea, vomiting, abdominal pain - chief complaint 


Leukocytosis


   She is undergoing further evaluation for possible peritonitis.


Anemia





Conservative cardiac management.





Subjective


Date of service: 05/14/21


Principal diagnosis: Abdominal pain


Interval history: 





Patient is resting in bed comfortable. No distress noted. She denies chest pain 

and denies unusual shortness of breath.


She is undergoing further evaluation for possible peritonitis.





Objective


                                   Vital Signs











  Temp Pulse Pulse Resp BP Pulse Ox


 


 05/14/21 00:00   105 H    


 


 05/13/21 23:45       93


 


 05/13/21 23:20  97.3 F L  103 H   16  122/74  76 L


 


 05/13/21 21:51      102/48 


 


 05/13/21 19:46  98.5 F  94 H   16  102/48  77 L


 


 05/13/21 16:37  97.8 F  93 H   19  112/63  74 L


 


 05/13/21 16:00   101 H    


 


 05/13/21 14:02   78    110/75 


 


 05/13/21 12:00    101 H  17   96


 


 05/13/21 09:16   111 H    128/67 














- Physical Examination


General: No Apparent Distress


HEENT: Positive: PERRL


Neck: Positive: trachea midline


Cardiac: Positive: Reg Rate and Rhythm


Lungs: Positive: Decreased Breath Sounds


Neuro: Positive: Grossly Intact


Extremities: Absent: edema





- Labs and Meds


                                       CBC











  05/14/21 Range/Units





  04:58 


 


Hgb  8.9 L  (10.1-14.3)  gm/dl


 


Hct  29.1 L  (30.3-42.9)  %


 


Plt Count  252  (140-440)  K/mm3

## 2021-05-15 LAB
BASOPHILS # (AUTO): 0 K/MM3 (ref 0–0.1)
BASOPHILS NFR BLD AUTO: 0.3 % (ref 0–1.8)
BUN SERPL-MCNC: 45 MG/DL (ref 7–17)
BUN/CREAT SERPL: 5 %
CALCIUM SERPL-MCNC: 6.7 MG/DL (ref 8.4–10.2)
EOSINOPHIL # BLD AUTO: 0.6 K/MM3 (ref 0–0.4)
EOSINOPHIL NFR BLD AUTO: 4 % (ref 0–4.3)
HCT VFR BLD CALC: 26.7 % (ref 30.3–42.9)
HEMOLYSIS INDEX: 0
HGB BLD-MCNC: 8.3 GM/DL (ref 10.1–14.3)
LYMPHOCYTES # BLD AUTO: 0.7 K/MM3 (ref 1.2–5.4)
LYMPHOCYTES NFR BLD AUTO: 4.7 % (ref 13.4–35)
MCHC RBC AUTO-ENTMCNC: 31 % (ref 30–34)
MCV RBC AUTO: 95 FL (ref 79–97)
MONOCYTES # (AUTO): 1.1 K/MM3 (ref 0–0.8)
MONOCYTES % (AUTO): 7.6 % (ref 0–7.3)
PLATELET # BLD: 254 K/MM3 (ref 140–440)
RBC # BLD AUTO: 2.82 M/MM3 (ref 3.65–5.03)

## 2021-05-15 RX ADMIN — CALCITRIOL SCH MCG: 0.5 CAPSULE, LIQUID FILLED ORAL at 09:00

## 2021-05-15 RX ADMIN — CALCIUM ACETATE SCH MG: 667 CAPSULE ORAL at 17:22

## 2021-05-15 RX ADMIN — INSULIN LISPRO SCH: 100 INJECTION, SOLUTION INTRAVENOUS; SUBCUTANEOUS at 08:43

## 2021-05-15 RX ADMIN — DOCUSATE SODIUM SCH: 100 CAPSULE, LIQUID FILLED ORAL at 09:00

## 2021-05-15 RX ADMIN — INSULIN GLARGINE SCH UNITS: 100 INJECTION, SOLUTION SUBCUTANEOUS at 21:27

## 2021-05-15 RX ADMIN — INSULIN LISPRO SCH: 100 INJECTION, SOLUTION INTRAVENOUS; SUBCUTANEOUS at 21:33

## 2021-05-15 RX ADMIN — CEFEPIME SCH MLS/HR: 1 INJECTION, POWDER, FOR SOLUTION INTRAMUSCULAR; INTRAVENOUS at 17:22

## 2021-05-15 RX ADMIN — ASPIRIN SCH MG: 325 TABLET, COATED ORAL at 09:00

## 2021-05-15 RX ADMIN — SODIUM CHLORIDE, SODIUM LACTATE, CALCIUM CHLORIDE, MAGNESIUM CHLORIDE AND DEXTROSE SCH: 1.5; 538; 448; 18.3; 5.08 INJECTION, SOLUTION INTRAPERITONEAL at 17:22

## 2021-05-15 RX ADMIN — NIFEDIPINE SCH MG: 60 TABLET, EXTENDED RELEASE ORAL at 21:26

## 2021-05-15 RX ADMIN — Medication SCH ML: at 21:27

## 2021-05-15 RX ADMIN — POLYETHYLENE GLYCOL 3350 SCH: 17 POWDER, FOR SOLUTION ORAL at 09:01

## 2021-05-15 RX ADMIN — SODIUM CHLORIDE, SODIUM LACTATE, CALCIUM CHLORIDE, MAGNESIUM CHLORIDE AND DEXTROSE SCH: 1.5; 538; 448; 18.3; 5.08 INJECTION, SOLUTION INTRAPERITONEAL at 21:27

## 2021-05-15 RX ADMIN — SODIUM CHLORIDE, SODIUM LACTATE, CALCIUM CHLORIDE, MAGNESIUM CHLORIDE AND DEXTROSE SCH: 1.5; 538; 448; 18.3; 5.08 INJECTION, SOLUTION INTRAPERITONEAL at 09:48

## 2021-05-15 RX ADMIN — INSULIN LISPRO SCH: 100 INJECTION, SOLUTION INTRAVENOUS; SUBCUTANEOUS at 12:46

## 2021-05-15 RX ADMIN — CALCIUM ACETATE SCH MG: 667 CAPSULE ORAL at 08:59

## 2021-05-15 RX ADMIN — SODIUM CHLORIDE, SODIUM LACTATE, CALCIUM CHLORIDE, MAGNESIUM CHLORIDE AND DEXTROSE SCH: 1.5; 538; 448; 18.3; 5.08 INJECTION, SOLUTION INTRAPERITONEAL at 08:04

## 2021-05-15 RX ADMIN — DOCUSATE SODIUM SCH MG: 100 CAPSULE, LIQUID FILLED ORAL at 21:26

## 2021-05-15 RX ADMIN — INSULIN LISPRO SCH: 100 INJECTION, SOLUTION INTRAVENOUS; SUBCUTANEOUS at 17:22

## 2021-05-15 RX ADMIN — CINACALCET HYDROCHLORIDE SCH MG: 30 TABLET, FILM COATED ORAL at 09:00

## 2021-05-15 RX ADMIN — CALCIUM ACETATE SCH: 667 CAPSULE ORAL at 12:47

## 2021-05-15 RX ADMIN — SODIUM CHLORIDE, SODIUM LACTATE, CALCIUM CHLORIDE, MAGNESIUM CHLORIDE AND DEXTROSE SCH: 1.5; 538; 448; 18.3; 5.08 INJECTION, SOLUTION INTRAPERITONEAL at 02:42

## 2021-05-15 RX ADMIN — Medication SCH ML: at 09:01

## 2021-05-15 RX ADMIN — Medication SCH UNIT: at 09:00

## 2021-05-15 NOTE — PROGRESS NOTE
Assessment and Plan





- Patient Problems


(1) Acute chest pain


Current Visit: Yes   Status: Acute   





(2) End-stage renal disease on peritoneal dialysis


Current Visit: Yes   Status: Acute   





Subjective


Date of service: 05/15/21


Principal diagnosis: Abdominal pain


Interval history: 





MILD PLEURITIC CP / TENDER,,,NO SOB





Objective


                                   Vital Signs











  Temp Pulse Resp BP BP Pulse Ox


 


 05/15/21 09:05  98 F  96 H  18   137/65  94


 


 05/15/21 09:01   95 H   137/65  


 


 05/15/21 09:00   96 H   137/65  


 


 05/15/21 08:32    18    95


 


 05/15/21 04:53  98.0 F  99 H  18  136/77   85


 


 05/15/21 04:00       97


 


 05/15/21 00:00   89    


 


 05/14/21 23:58  97.4 F L  60  18  120/61   94


 


 05/14/21 20:24  98.2 F  95 H  18  116/75   94


 


 05/14/21 16:52   76   92/47  


 


 05/14/21 16:40   82    


 


 05/14/21 12:00    18    95














- Physical Examination


General: No Apparent Distress


HEENT: Positive: PERRL


Neck: Positive: trachea midline


Cardiac: Positive: Reg Rate and Rhythm


Lungs: Positive: Decreased Breath Sounds


Neuro: Positive: Grossly Intact


Abdomen: Positive: Soft


Extremities: Present: normal.  Absent: edema





- Labs and Meds


                                       CBC











  05/15/21 Range/Units





  04:31 


 


WBC  14.2 H  (4.5-11.0)  K/mm3


 


RBC  2.82 L  (3.65-5.03)  M/mm3


 


Hgb  8.3 L  (10.1-14.3)  gm/dl


 


Hct  26.7 L  (30.3-42.9)  %


 


Plt Count  254  (140-440)  K/mm3


 


Lymph # (Auto)  0.7 L  (1.2-5.4)  K/mm3


 


Mono # (Auto)  1.1 H  (0.0-0.8)  K/mm3


 


Eos # (Auto)  0.6 H  (0.0-0.4)  K/mm3


 


Baso # (Auto)  0.0  (0.0-0.1)  K/mm3








                          Comprehensive Metabolic Panel











  05/14/21 05/15/21 Range/Units





  13:46 04:31 


 


Sodium  139  138  (137-145)  mmol/L


 


Potassium  3.6  3.3 L  (3.6-5.0)  mmol/L


 


Chloride  90.4 L  90.2 L  ()  mmol/L


 


Carbon Dioxide  34 H  34 H  (22-30)  mmol/L


 


BUN  52 H  45 H  (7-17)  mg/dL


 


Creatinine  11.4 H  9.9 H  (0.6-1.2)  mg/dL


 


Glucose  82  62 L  ()  mg/dL


 


Calcium  7.0 L  6.7 L  (8.4-10.2)  mg/dL

## 2021-05-15 NOTE — PROGRESS NOTE
Assessment and Plan


Assessment





End-stage renal disease on PD


Peritonitis?


Leukocytosis


Anemia of ESRD


Hyperparathyroidism


Hyperphosphatemia


Hypertension





Recommendations





Continue APD


Continue antibiotics


ID note reviewed


Bowel regimen to regulate BM


Continue calcitriol


Continue binder


Continue antihypertensives


Epogen thrice weekly


Renally dose medications


Renal diet








Subjective


Date of service: 05/15/21


Principal diagnosis: Abdominal pain


Interval history: 


Undergoing PD on cycler. Abdominal pain improved.








Objective





- Exam


Narrative Exam: 


General appearance: No acute distress


EENT: Anicteric sclera, hearing intact


Neck: Supple


Respiratory: Normal effort, CTA bilaterally


Cardiovascular: RRR, no rub


Extremities: No edema


Abdominal: Soft, tender


Integumentary: Dry, intact


Musculoskeletal: No joint swelling or erythema


Psychiatric: Appropriate mood/affect, judgment intact


Neurologic: No focal deficits, moves all extremities


PD catheter, site is clean with no erythema or purulent drainage











- Vital Signs


Vital signs: 


                               Vital Signs - 12hr











  05/15/21 05/15/21 05/15/21





  08:32 09:00 09:01


 


Temperature   


 


Pulse Rate  96 H 95 H


 


Respiratory 18  





Rate   


 


Blood Pressure  137/65 137/65


 


Blood Pressure   





[Right]   


 


O2 Sat by Pulse 95  





Oximetry   














  05/15/21 05/15/21 05/15/21





  09:05 13:17 13:21


 


Temperature 98 F  


 


Pulse Rate 96 H 80 80


 


Respiratory 18  





Rate   


 


Blood Pressure   129/83


 


Blood Pressure 137/65 129/83 





[Right]   


 


O2 Sat by Pulse 94  





Oximetry   














- Lab





                                 05/15/21 04:31





                                 05/15/21 04:31


                             Most recent lab results











Calcium  6.7 mg/dL (8.4-10.2)  L  05/15/21  04:31    


 


Magnesium  1.40 mg/dL (1.7-2.3)  L  05/11/21  18:35    














Medications & Allergies





- Medications


Allergies/Adverse Reactions: 


                                    Allergies





digoxin Allergy (Verified 05/11/21 18:22)


   Hives


lisinopril Allergy (Verified 05/11/21 18:22)


   Swelling


nitroglycerin Adverse Reaction (Verified 05/11/21 18:22)


   Rash, HEADACHES


shellfish derived Adverse Reaction (Verified 05/11/21 18:22)


   Swelling








Home Medications: 


                                Home Medications











 Medication  Instructions  Recorded  Confirmed  Last Taken  Type


 


NIFEdipine XL [Procardia Xl] 60 mg PO HS 08/21/15 05/12/21 10/24/20 History


 


carvediloL [Coreg] 6.25 mg PO BID 08/21/15 05/12/21 10/24/20 History


 


Insulin Detemir (Nf) [Levemir 15 unit SQ QHS PRN 12/09/16 05/12/21 10/25/19 

History





Flextouch (Nf)]     


 


cloNIDine [Catapres] 0.2 mg PO DAILY 12/09/16 05/12/21 10/24/20 History


 


Gentamicin 0.1% Top Oint(Nf) 1 applic TP TID 10/25/20 05/12/21 Unknown History





[Gentamicin 0.1% Top Oint (Nf)]     


 


Cholecalciferol Vit D3 [Vitamin D3 3,000 unit PO DAILY  tablet 10/26/20 05/12/21

 Unknown Rx





1,000 UNIT TAB]     


 


Cinacalcet [Sensipar] 90 mg PO QDAY 05/12/21 05/12/21 Unknown History


 


Hydralazine HCl 50 mg PO TID 05/12/21 05/12/21 Unknown History











Active Medications: 














Generic Name Dose Route Start Last Admin





  Trade Name Jrq  PRN Reason Stop Dose Admin


 


Acetaminophen  650 mg  05/11/21 23:52 





  Acetaminophen 325 Mg Tab  PO  





  Q4H PRN  





  Pain MILD(1-3)/Fever >100.5/HA  


 


Albuterol  2.5 mg  05/14/21 12:24 





  Albuterol 2.5 Mg/3 Ml Nebu  IH  





  Q4HRT PRN  





  Shortness Of Breath  


 


Aspirin  325 mg  05/12/21 10:00  05/15/21 09:00





  Aspirin Ec 325 Mg Tab  PO   325 mg





  QDAY ANTONY   Administration


 


Bisacodyl  10 mg  05/13/21 11:33  05/14/21 22:08





  Bisacodyl 5 Mg Tab  PO   10 mg





  QDAY PRN   Administration





  Constipation  


 


Calcitriol  0.5 mcg  05/12/21 17:00  05/15/21 09:00





  Calcitriol 0.5 Mcg Cap  PO   0.5 mcg





  QDAY ANTONY   Administration


 


Calcium Acetate  1,334 mg  05/14/21 17:00  05/15/21 17:22





  Calcium Acetate 667 Mg Cap  PO   1,334 mg





  TIDWM ANTONY   Administration


 


Carvedilol  6.25 mg  05/12/21 11:00  05/15/21 09:00





  Carvedilol 6.25 Mg Tab  PO   6.25 mg





  BID ANTONY   Administration


 


Cholecalciferol  3,000 unit  05/12/21 11:00  05/15/21 09:00





  Cholecalciferol (Vit D3) 1000 Unit (25 Mcg) Tab  PO   3,000 unit





  DAILY ANTONY   Administration


 


Cinacalcet  90 mg  05/12/21 11:00  05/15/21 09:00





  Cinacalcet 30 Mg Tab  PO   90 mg





  QDAY ANTONY   Administration


 


Clonidine HCl  0.2 mg  05/12/21 11:00  05/15/21 09:01





  Clonidine 0.2 Mg Tab  PO   0.2 mg





  DAILY ANTONY   Administration


 


Dextrose  50 ml  05/11/21 23:52 





  Dextrose 50% In Water (25gm) 50 Ml Syringe  IV  





  Q30MIN PRN  





  Hypoglycemia  





  Protocol  


 


Docusate Sodium  100 mg  05/13/21 12:00  05/15/21 09:00





  Docusate Sodium 100 Mg Cap  PO   Not Given





  BID ANTONY  


 


Hydralazine HCl  50 mg  05/12/21 14:00  05/15/21 13:21





  Hydralazine 25 Mg Tab  PO   50 mg





  Q8HR ANTONY   Administration


 


Hydroxyzine HCl  25 mg  05/13/21 14:05  05/14/21 23:28





  Hydroxyzine Hcl 25 Mg Tab  PO   25 mg





  Q6H PRN   Administration





  Itching  


 


Sodium Chloride  100 mls @ 999 mls/hr  05/12/21 11:00 





  Nacl 0.9%  IV  





  KYLIE PRN  





  Hypotension  


 


Cefepime HCl  1 gm in 100 mls @ 200 mls/hr  05/12/21 18:00  05/15/21 17:22





  Cefepime/Ns 1 Gm/100 Ml  IV   200 mls/hr





  Q24H ANTONY   Administration





  Protocol  


 


Insulin Glargine  15 units  05/12/21 22:00  05/14/21 21:55





  Insulin Glargine 100 Units/Ml  SUB-Q   15 units





  QHS ANTONY   Administration


 


Insulin Human Lispro  0 unit  05/12/21 07:30  05/15/21 17:22





  Insulin Lispro 100 Unit/Ml  SUB-Q   Not Given





  [facility]  





  Protocol  


 


Magnesium Hydroxide  30 ml  05/11/21 23:52 





  Magnesium Hydroxide (Mom) Oral Liqd Udc  PO  





  Q4H PRN  





  Constipation  


 


Morphine Sulfate  2 mg  05/11/21 23:52  05/14/21 23:49





  Morphine 4 Mg/1 Ml Inj  IV   2 mg





  Q5MIN PRN   Administration





  Chest Pain  


 


Nifedipine  60 mg  05/12/21 22:00  05/14/21 21:57





  Nifedipine Xl 60 Mg Tab  PO   Not Given





  HS ANTONY  


 


Ondansetron HCl  4 mg  05/11/21 23:52  05/13/21 16:54





  Ondansetron 4 Mg/2 Ml Inj  IV   4 mg





  Q8H PRN   Administration





  Nausea And Vomiting  


 


Peritoneal Dialysis Solution  2,000 ml  05/12/21 16:00  05/15/21 17:22





  Dialysate Lo Aneesh 1.5% Soln 2000 Ml  IP   Not Given





  Q6H ANTONY  


 


Polyethylene Glycol  17 gm  05/13/21 12:00  05/15/21 09:01





  Polyethylene Glycol 3350 17 Gm Powder  PO   Not Given





  QDAY ANTONY  


 


Sodium Chloride  10 ml  05/12/21 10:00  05/15/21 09:01





  Sodium Chloride 0.9% 10 Ml Flush Syringe  IV   10 ml





  BID ANTONY   Administration


 


Sodium Chloride  10 ml  05/11/21 23:52 





  Sodium Chloride 0.9% 10 Ml Flush Syringe  IV  





  PRN PRN  





  LINE FLUSH  


 


Tramadol HCl  50 mg  05/11/21 23:52 





  Tramadol 50 Mg Tab  PO  





  Q6H PRN  





  Pain, Moderate (4-6)

## 2021-05-15 NOTE — PROGRESS NOTE
Assessment and Plan


-- acute hypoxic respiratory failure


Patient noted hypoxic with O2 sat 77% at RA


repeat CXR showed moderate pleural effusion, follow clinically


she completed her COVID vaccine - so doubt COVID infection 





--Right-sided pleural effusion, need thoracentesis, will order





-- Acute chest pain, likely atypical


Patient placed on telemetry.


Follow serial cardiac enzymes.


Patient placed on aspirin, sublingual nitroglycerin and IV morphine as needed 

for chest pain.


Medical management per cardiology





--NSTEMI type II


Troponin has been elevated probably secondary to the end-stage renal disease 


however patient had placed on heparin drip prior to cardiology evaluation and 

recommendation.


Medical management per cardiology, follow 2D echocardiogram


Stop heparin drip now





-- Abdominal pain, likely from peritonitis


Etiology unclear.  CT of the abdomen and pelvis has been negative.


Will place on analgesic medications as needed. cont iv abx for possible 

peritonitis


Ordered for peritoneal fluid study





-- End-stage renal disease on peritoneal dialysis


Patient on peritoneal dialysis.  Consult placed to nephrology for evaluation.





-- Hypertension 


Continue routine home medications and monitor vital signs closely.





-- DVT prophylaxis


Patient currently on anticoagulation.





-- Full code status


Patient is a full code.





Daily clinical course: 


5/12/21: start on empiric abx for possible peritonitis. will follow peritoneal 

fluid cx report.  Continue to trend troponin, continue heparin drip, follow 

cardiology recommendation.





5/13/21: Patient peritoneal fluid study pending, IV antibiotics changed to 

cefepime.  ID has been consulted.  Patient continued to complains of abdominal 

pain.  Cardiology recommended medical management for elevated troponin - will DC

heparin drip.  Follow 2D echo result and pending peritoneal fluid study.





5/14: cont abx for peritonitis, patient still has diffuse abd pain. noted 

hypoxic this am, order repeat CXR. order nebs 





5/15: Peritoneal/ascitic fluid study for cell count still pending, so far no 

growth in culture.  Discussed with ID and will resent peritoneal fluid for cell 

count.  Repeat chest x-ray suggestive of right-sided moderate pleural effusion 

which will need thoracentesis.  Continue to follow the patient clinically. 











Subjective


Date of service: 05/15/21


Principal diagnosis: Abdominal pain


Interval history: 





Patient seen and examined.  Medical records and medication list reviewed.  


No acute event overnight noted by the RN.  


Patient continued to complains of abdominal pain and states that she has poor 

appetite


Patient remains on supplemental O2


Peritoneal fluid study pending


Chest x-ray results stable right-sided moderate pleural effusion


Discussed plan of care at bedside with patient.








Objective





- Exam


Narrative Exam: 





GENERAL:  well-developed -American female lying on bed appeared to be in 

no discomfort. 


HEENT: Normocephalic.  Atraumatic.  No conjunctival congestion or icterus. 

Patient has moist mucous membranes.


NECK: Supple.  Trachea midline.


CHEST/LUNGS: Diminished breath sound auscultated bilaterally, patient on 

supplemental O2


HEART/CARDIOVASCULAR: Regular in rate and rhythm.  S1 and S2 positive.


ABDOMEN: Abdomen is soft but diffusely tender.  Patient has normal bowel sounds.

 


SKIN: There is no rash.  Warm and dry.


NEURO:  No focal motor deficit.  Follows command.


MUSCULOSKELETAL: No joint effusion or tenderness.


EXTRIMITY: No edema, no cyanosis or clubbing.


PSYCH:  Cooperative.








- Constitutional


Vitals: 


                               Vital Signs - 12hr











  05/15/21 05/15/21 05/15/21





  04:00 04:53 07:05


 


Temperature  98.0 F 


 


Pulse Rate  99 H 92 H


 


Respiratory  18 





Rate   


 


Blood Pressure  136/77 


 


Blood Pressure   





[Right]   


 


O2 Sat by Pulse 97 85 





Oximetry   














  05/15/21 05/15/21 05/15/21





  08:32 09:00 09:01


 


Temperature   


 


Pulse Rate  96 H 95 H


 


Respiratory 18  





Rate   


 


Blood Pressure  137/65 137/65


 


Blood Pressure   





[Right]   


 


O2 Sat by Pulse 95  





Oximetry   














  05/15/21 05/15/21 05/15/21





  09:05 13:17 13:21


 


Temperature 98 F  


 


Pulse Rate 96 H 80 80


 


Respiratory 18  





Rate   


 


Blood Pressure   129/83


 


Blood Pressure 137/65 129/83 





[Right]   


 


O2 Sat by Pulse 94  





Oximetry   














- Labs


CBC & Chem 7: 


                                 05/16/21 05:16





                                 05/15/21 04:31


Labs: 


                              Abnormal lab results











  05/14/21 05/14/21 05/14/21 Range/Units





  11:10 13:46 16:34 


 


WBC     (4.5-11.0)  K/mm3


 


RBC     (3.65-5.03)  M/mm3


 


Hgb     (10.1-14.3)  gm/dl


 


Hct     (30.3-42.9)  %


 


RDW     (13.2-15.2)  %


 


Lymph % (Auto)     (13.4-35.0)  %


 


Mono % (Auto)     (0.0-7.3)  %


 


Lymph # (Auto)     (1.2-5.4)  K/mm3


 


Mono # (Auto)     (0.0-0.8)  K/mm3


 


Eos # (Auto)     (0.0-0.4)  K/mm3


 


Seg Neutrophils %     (40.0-70.0)  %


 


Seg Neutrophils #     (1.8-7.7)  K/mm3


 


Potassium     (3.6-5.0)  mmol/L


 


Chloride   90.4 L   ()  mmol/L


 


Carbon Dioxide   34 H   (22-30)  mmol/L


 


BUN   52 H   (7-17)  mg/dL


 


Creatinine   11.4 H   (0.6-1.2)  mg/dL


 


Glucose     ()  mg/dL


 


POC Glucose  123 H   134 H  ()  mg/dL


 


Calcium   7.0 L   (8.4-10.2)  mg/dL














  05/14/21 05/15/21 05/15/21 Range/Units





  21:30 04:31 04:31 


 


WBC   14.2 H   (4.5-11.0)  K/mm3


 


RBC   2.82 L   (3.65-5.03)  M/mm3


 


Hgb   8.3 L   (10.1-14.3)  gm/dl


 


Hct   26.7 L   (30.3-42.9)  %


 


RDW   17.5 H   (13.2-15.2)  %


 


Lymph % (Auto)   4.7 L   (13.4-35.0)  %


 


Mono % (Auto)   7.6 H   (0.0-7.3)  %


 


Lymph # (Auto)   0.7 L   (1.2-5.4)  K/mm3


 


Mono # (Auto)   1.1 H   (0.0-0.8)  K/mm3


 


Eos # (Auto)   0.6 H   (0.0-0.4)  K/mm3


 


Seg Neutrophils %   83.4 H   (40.0-70.0)  %


 


Seg Neutrophils #   11.8 H   (1.8-7.7)  K/mm3


 


Potassium    3.3 L  (3.6-5.0)  mmol/L


 


Chloride    90.2 L  ()  mmol/L


 


Carbon Dioxide    34 H  (22-30)  mmol/L


 


BUN    45 H  (7-17)  mg/dL


 


Creatinine    9.9 H  (0.6-1.2)  mg/dL


 


Glucose    62 L  ()  mg/dL


 


POC Glucose  211 H    ()  mg/dL


 


Calcium    6.7 L  (8.4-10.2)  mg/dL














  05/15/21 Range/Units





  08:04 


 


WBC   (4.5-11.0)  K/mm3


 


RBC   (3.65-5.03)  M/mm3


 


Hgb   (10.1-14.3)  gm/dl


 


Hct   (30.3-42.9)  %


 


RDW   (13.2-15.2)  %


 


Lymph % (Auto)   (13.4-35.0)  %


 


Mono % (Auto)   (0.0-7.3)  %


 


Lymph # (Auto)   (1.2-5.4)  K/mm3


 


Mono # (Auto)   (0.0-0.8)  K/mm3


 


Eos # (Auto)   (0.0-0.4)  K/mm3


 


Seg Neutrophils %   (40.0-70.0)  %


 


Seg Neutrophils #   (1.8-7.7)  K/mm3


 


Potassium   (3.6-5.0)  mmol/L


 


Chloride   ()  mmol/L


 


Carbon Dioxide   (22-30)  mmol/L


 


BUN   (7-17)  mg/dL


 


Creatinine   (0.6-1.2)  mg/dL


 


Glucose   ()  mg/dL


 


POC Glucose  68 L  ()  mg/dL


 


Calcium   (8.4-10.2)  mg/dL














HEART Score





- HEART Score


EKG: Normal


Age: > 65


Risk factors: 1-2 risk factors


Troponin: 


                                        











Troponin T  0.102 ng/mL (0.00-0.029)  H*  05/12/21  06:00    











Troponin: > 3x normal limit





- Critical Actions


Critical Actions: 4-6 pts:12-16.6% risk of adverse cardiac event. Should be 

admitted

## 2021-05-16 LAB
HCT VFR BLD CALC: 26.1 % (ref 30.3–42.9)
HGB BLD-MCNC: 8.3 GM/DL (ref 10.1–14.3)
MONOCYTES # FLD: 4 %
PLATELET # BLD: 266 K/MM3 (ref 140–440)
TOTAL CELLS COUNTED: 100 /MM3

## 2021-05-16 RX ADMIN — INSULIN LISPRO SCH: 100 INJECTION, SOLUTION INTRAVENOUS; SUBCUTANEOUS at 09:00

## 2021-05-16 RX ADMIN — CEFEPIME SCH MLS/HR: 1 INJECTION, POWDER, FOR SOLUTION INTRAMUSCULAR; INTRAVENOUS at 17:01

## 2021-05-16 RX ADMIN — ASPIRIN SCH MG: 325 TABLET, COATED ORAL at 10:04

## 2021-05-16 RX ADMIN — NIFEDIPINE SCH MG: 60 TABLET, EXTENDED RELEASE ORAL at 21:33

## 2021-05-16 RX ADMIN — SODIUM CHLORIDE, SODIUM LACTATE, CALCIUM CHLORIDE, MAGNESIUM CHLORIDE AND DEXTROSE SCH: 1.5; 538; 448; 18.3; 5.08 INJECTION, SOLUTION INTRAPERITONEAL at 16:58

## 2021-05-16 RX ADMIN — SODIUM CHLORIDE, SODIUM LACTATE, CALCIUM CHLORIDE, MAGNESIUM CHLORIDE AND DEXTROSE SCH: 1.5; 538; 448; 18.3; 5.08 INJECTION, SOLUTION INTRAPERITONEAL at 03:56

## 2021-05-16 RX ADMIN — Medication SCH UNIT: at 10:04

## 2021-05-16 RX ADMIN — INSULIN LISPRO SCH: 100 INJECTION, SOLUTION INTRAVENOUS; SUBCUTANEOUS at 16:59

## 2021-05-16 RX ADMIN — CALCIUM ACETATE SCH MG: 667 CAPSULE ORAL at 09:04

## 2021-05-16 RX ADMIN — CALCIUM ACETATE SCH MG: 667 CAPSULE ORAL at 17:01

## 2021-05-16 RX ADMIN — SODIUM CHLORIDE, SODIUM LACTATE, CALCIUM CHLORIDE, MAGNESIUM CHLORIDE AND DEXTROSE SCH: 1.5; 538; 448; 18.3; 5.08 INJECTION, SOLUTION INTRAPERITONEAL at 10:05

## 2021-05-16 RX ADMIN — PANTOPRAZOLE SODIUM SCH MG: 40 INJECTION, POWDER, FOR SOLUTION INTRAVENOUS at 21:32

## 2021-05-16 RX ADMIN — CALCITRIOL SCH MCG: 0.5 CAPSULE, LIQUID FILLED ORAL at 10:04

## 2021-05-16 RX ADMIN — DOCUSATE SODIUM SCH MG: 100 CAPSULE, LIQUID FILLED ORAL at 10:04

## 2021-05-16 RX ADMIN — SODIUM CHLORIDE, SODIUM LACTATE, CALCIUM CHLORIDE, MAGNESIUM CHLORIDE AND DEXTROSE SCH: 1.5; 538; 448; 18.3; 5.08 INJECTION, SOLUTION INTRAPERITONEAL at 22:00

## 2021-05-16 RX ADMIN — Medication SCH ML: at 21:33

## 2021-05-16 RX ADMIN — PANTOPRAZOLE SODIUM SCH MG: 40 INJECTION, POWDER, FOR SOLUTION INTRAVENOUS at 10:09

## 2021-05-16 RX ADMIN — INSULIN LISPRO SCH: 100 INJECTION, SOLUTION INTRAVENOUS; SUBCUTANEOUS at 11:47

## 2021-05-16 RX ADMIN — INSULIN LISPRO SCH: 100 INJECTION, SOLUTION INTRAVENOUS; SUBCUTANEOUS at 21:34

## 2021-05-16 RX ADMIN — POLYETHYLENE GLYCOL 3350 SCH GM: 17 POWDER, FOR SOLUTION ORAL at 10:04

## 2021-05-16 RX ADMIN — CALCIUM ACETATE SCH MG: 667 CAPSULE ORAL at 14:12

## 2021-05-16 RX ADMIN — CINACALCET HYDROCHLORIDE SCH MG: 30 TABLET, FILM COATED ORAL at 10:04

## 2021-05-16 RX ADMIN — Medication SCH ML: at 10:05

## 2021-05-16 RX ADMIN — DOCUSATE SODIUM SCH MG: 100 CAPSULE, LIQUID FILLED ORAL at 21:33

## 2021-05-16 NOTE — PROGRESS NOTE
Assessment and Plan


Assessment





End-stage renal disease on PD


Peritonitis?


SIRS


Leukocytosis


Anemia of ESRD


Hyperparathyroidism


Hyperphosphatemia


Hypertension





Recommendations





Continue APD


Continue antibiotics


ID note reviewed - plans for resending studies noted


Bowel regimen to regulate BM


Continue calcitriol


Continue binder


Continue antihypertensives


Epogen thrice weekly


Renally dose medications


Renal diet








Subjective


Date of service: 05/16/21


Principal diagnosis: Abdominal pain


Interval history: 


Undergoing PD on cycler. Notes abdominal pain this morning. Last BM yesterday.








Objective





- Exam


Narrative Exam: 


General appearance: No acute distress


EENT: Anicteric sclera, hearing intact


Neck: Supple


Respiratory: Normal effort, CTA bilaterally


Cardiovascular: RRR, no rub


Extremities: No edema


Abdominal: Soft, tender to touch in lower quadrants


Integumentary: Dry, intact


Musculoskeletal: No joint swelling or erythema


Psychiatric: Appropriate mood/affect, judgment intact


Neurologic: No focal deficits, moves all extremities


PD catheter, site is clean with no erythema or purulent drainage











- Vital Signs


Vital signs: 


                               Vital Signs - 12hr











  05/16/21 05/16/21 05/16/21





  10:05 10:09 10:58


 


Temperature  98.1 F 


 


Pulse Rate 89 89 


 


Pulse Rate [   89





Apical]   


 


Pulse Rate [   89





From Monitor]   


 


Respiratory  16 17





Rate   


 


Blood Pressure 143/74  


 


Blood Pressure  143/74 





[Right]   


 


O2 Sat by Pulse  96 





Oximetry   














  05/16/21 05/16/21 05/16/21





  11:53 14:12 15:00


 


Temperature 98.0 F  


 


Pulse Rate 97 H 66 101 H


 


Pulse Rate [   





Apical]   


 


Pulse Rate [   





From Monitor]   


 


Respiratory 16  





Rate   


 


Blood Pressure  123/59 


 


Blood Pressure 138/67  





[Right]   


 


O2 Sat by Pulse 95  





Oximetry   














  05/16/21 05/16/21





  17:15 19:03


 


Temperature 97.8 F 98.3 F


 


Pulse Rate 81 86


 


Pulse Rate [  





Apical]  


 


Pulse Rate [  





From Monitor]  


 


Respiratory 17 18





Rate  


 


Blood Pressure  138/76


 


Blood Pressure 128/67 





[Right]  


 


O2 Sat by Pulse 96 98





Oximetry  














- Lab





                                 05/16/21 05:16





                                 05/15/21 04:31


                             Most recent lab results











Calcium  6.7 mg/dL (8.4-10.2)  L  05/15/21  04:31    


 


Magnesium  1.40 mg/dL (1.7-2.3)  L  05/11/21  18:35    














Medications & Allergies





- Medications


Allergies/Adverse Reactions: 


                                    Allergies





digoxin Allergy (Verified 05/11/21 18:22)


   Hives


lisinopril Allergy (Verified 05/11/21 18:22)


   Swelling


nitroglycerin Adverse Reaction (Verified 05/11/21 18:22)


   Rash, HEADACHES


shellfish derived Adverse Reaction (Verified 05/11/21 18:22)


   Swelling








Home Medications: 


                                Home Medications











 Medication  Instructions  Recorded  Confirmed  Last Taken  Type


 


NIFEdipine XL [Procardia Xl] 60 mg PO HS 08/21/15 05/12/21 10/24/20 History


 


carvediloL [Coreg] 6.25 mg PO BID 08/21/15 05/12/21 10/24/20 History


 


Insulin Detemir (Nf) [Levemir 15 unit SQ QHS PRN 12/09/16 05/12/21 10/25/19 

History





Flextouch (Nf)]     


 


cloNIDine [Catapres] 0.2 mg PO DAILY 12/09/16 05/12/21 10/24/20 History


 


Gentamicin 0.1% Top Oint(Nf) 1 applic TP TID 10/25/20 05/12/21 Unknown History





[Gentamicin 0.1% Top Oint (Nf)]     


 


Cholecalciferol Vit D3 [Vitamin D3 3,000 unit PO DAILY  tablet 10/26/20 05/12/21

 Unknown Rx





1,000 UNIT TAB]     


 


Cinacalcet [Sensipar] 90 mg PO QDAY 05/12/21 05/12/21 Unknown History


 


Hydralazine HCl 50 mg PO TID 05/12/21 05/12/21 Unknown History











Active Medications: 














Generic Name Dose Route Start Last Admin





  Trade Name Freq  PRN Reason Stop Dose Admin


 


Acetaminophen  650 mg  05/11/21 23:52 





  Acetaminophen 325 Mg Tab  PO  





  Q4H PRN  





  Pain MILD(1-3)/Fever >100.5/HA  


 


Albuterol  2.5 mg  05/14/21 12:24 





  Albuterol 2.5 Mg/3 Ml Nebu  IH  





  Q4HRT PRN  





  Shortness Of Breath  


 


Aspirin  325 mg  05/12/21 10:00  05/16/21 10:04





  Aspirin Ec 325 Mg Tab  PO   325 mg





  QDAY ANTONY   Administration


 


Bisacodyl  10 mg  05/13/21 11:33  05/14/21 22:08





  Bisacodyl 5 Mg Tab  PO   10 mg





  QDAY PRN   Administration





  Constipation  


 


Calcitriol  0.5 mcg  05/12/21 17:00  05/16/21 10:04





  Calcitriol 0.5 Mcg Cap  PO   0.5 mcg





  QDAY ANTONY   Administration


 


Calcium Acetate  1,334 mg  05/14/21 17:00  05/16/21 17:01





  Calcium Acetate 667 Mg Cap  PO   1,334 mg





  TIDWM ANTONY   Administration


 


Carvedilol  6.25 mg  05/12/21 11:00  05/16/21 10:05





  Carvedilol 6.25 Mg Tab  PO   6.25 mg





  BID ANTONY   Administration


 


Cholecalciferol  3,000 unit  05/12/21 11:00  05/16/21 10:04





  Cholecalciferol (Vit D3) 1000 Unit (25 Mcg) Tab  PO   3,000 unit





  DAILY ANTONY   Administration


 


Cinacalcet  90 mg  05/12/21 11:00  05/16/21 10:04





  Cinacalcet 30 Mg Tab  PO   90 mg





  QDAY ANTONY   Administration


 


Clonidine HCl  0.2 mg  05/12/21 11:00  05/16/21 10:05





  Clonidine 0.2 Mg Tab  PO   0.2 mg





  DAILY ANTONY   Administration


 


Dextrose  50 ml  05/11/21 23:52 





  Dextrose 50% In Water (25gm) 50 Ml Syringe  IV  





  Q30MIN PRN  





  Hypoglycemia  





  Protocol  


 


Docusate Sodium  100 mg  05/13/21 12:00  05/16/21 10:04





  Docusate Sodium 100 Mg Cap  PO   100 mg





  BID ANTONY   Administration


 


Hydralazine HCl  50 mg  05/12/21 14:00  05/16/21 14:12





  Hydralazine 25 Mg Tab  PO   50 mg





  Q8HR ANTONY   Administration


 


Hydroxyzine HCl  25 mg  05/13/21 14:05  05/16/21 18:24





  Hydroxyzine Hcl 25 Mg Tab  PO   25 mg





  Q6H PRN   Administration





  Itching  


 


Sodium Chloride  100 mls @ 999 mls/hr  05/12/21 11:00 





  Nacl 0.9%  IV  





  KYLIE PRN  





  Hypotension  


 


Cefepime HCl  1 gm in 100 mls @ 200 mls/hr  05/12/21 18:00  05/16/21 17:01





  Cefepime/Ns 1 Gm/100 Ml  IV   200 mls/hr





  Q24H ANTONY   Administration





  Protocol  


 


Insulin Human Lispro  0 unit  05/12/21 07:30  05/16/21 16:59





  Insulin Lispro 100 Unit/Ml  SUB-Q   Not Given





  [facility] ANTONY  





  Protocol  


 


Magnesium Hydroxide  30 ml  05/11/21 23:52 





  Magnesium Hydroxide (Mom) Oral Liqd Udc  PO  





  Q4H PRN  





  Constipation  


 


Morphine Sulfate  2 mg  05/11/21 23:52  05/14/21 23:49





  Morphine 4 Mg/1 Ml Inj  IV   2 mg





  Q5MIN PRN   Administration





  Chest Pain  


 


Nifedipine  60 mg  05/12/21 22:00  05/15/21 21:26





  Nifedipine Xl 60 Mg Tab  PO   60 mg





  HS ANTONY   Administration


 


Ondansetron HCl  4 mg  05/11/21 23:52  05/13/21 16:54





  Ondansetron 4 Mg/2 Ml Inj  IV   4 mg





  Q8H PRN   Administration





  Nausea And Vomiting  


 


Pantoprazole Sodium  40 mg  05/16/21 10:00  05/16/21 10:09





  Pantoprazole 40 Mg Inj  IV   40 mg





  BID ANTONY   Administration


 


Peritoneal Dialysis Solution  2,000 ml  05/12/21 16:00  05/16/21 16:58





  Dialysate Lo Aneesh 1.5% Soln 2000 Ml  IP   Not Given





  Q6H ANTONY  


 


Polyethylene Glycol  17 gm  05/13/21 12:00  05/16/21 10:04





  Polyethylene Glycol 3350 17 Gm Powder  PO   17 gm





  QDAY ANTONY   Administration


 


Sodium Biphosphate/Sodium Phosphate  133 ml  05/16/21 21:00 





  Fleet Enema  SD  05/16/21 21:01 





  ONCE ONE  


 


Sodium Chloride  10 ml  05/12/21 10:00  05/16/21 10:05





  Sodium Chloride 0.9% 10 Ml Flush Syringe  IV   10 ml





  BID ANTONY   Administration


 


Sodium Chloride  10 ml  05/11/21 23:52 





  Sodium Chloride 0.9% 10 Ml Flush Syringe  IV  





  PRN PRN  





  LINE FLUSH  


 


Tramadol HCl  50 mg  05/11/21 23:52 





  Tramadol 50 Mg Tab  PO  





  Q6H PRN  





  Pain, Moderate (4-6)

## 2021-05-16 NOTE — PROGRESS NOTE
Assessment and Plan


Cultures:


Blood cultures no growth today


Peritoneal culture no growth today





Assessment: 70-year-old female with history of diabetes mellitus, hypertension, 

CHF, ESRD on peritoneal dialysis, admitted on 5/11/2021 secondary to epigastric 

abdominal pain for 3 days:


 


#SIRS rule out sepsis: Remains with leukocytosis; source unclear, possible PD 

associated peritonitis vs colitis.





#Abdominal pain: Not better. Suspect PD associated peritonitis.  Peritoneal 

fluid Gram stain with multiple WBCs no growth so far.  Peritoneal fluid cell 

count and differential not done 





#Diarrhea: Resolved.





#ESRD on PD





Recommendations:


-Re-send peritoneal fluid for cell count, differential, Gram stain and culture 


-Continue pulse IV vancomycin


-Continue IV cefepime 1 g once a day renally adjusted


-Follow-up blood cultures


-Not ready to be d/c due to persistent abdominal pain and leukocytosis





Will follow.





Marisa Lynch MD


Infectious Diseases Consultant 


[facility] ([facility])


M [phone]


O [phone]


 





Subjective


Date of service: 05/16/21


Principal diagnosis: Abdominal pain


Interval history: 


Continues to complain of abdominal pain 6 of 10.  No fever.








Objective





- Exam


Narrative Exam: 


General appearance: Alert in NAD pleasant


Eyes: anicteric sclerae, moist conjunctivae; no lid-lag; PERRLA 


HENT: Normocephalic, Atraumatic; normal external ears, nares open, oropharynx 

clear 


Neck: supple, tracheal midline, no JVD


Lungs: CTA 


CV: RRR no murmur


Abdomen: Soft, diffuse tenderness, PD catheter exit site without erythema or 

drainage


Extremities: no edema, no cyanosis


Skin: No rash. 


Psych: no agitated


Neuro: alert and oriented x 3. Moving all extermities





 


 





- Constitutional


Vitals: 


                                   Vital Signs











Temp Pulse Resp BP Pulse Ox


 


 98.0 F   66   16   123/59   95 


 


 05/16/21 11:53  05/16/21 14:12  05/16/21 11:53  05/16/21 14:12  05/16/21 11:53








                           Temperature -Last 24 Hours











Temperature                    98.0 F


 


Temperature                    98.1 F


 


Temperature                    98.3 F


 


Temperature                    97.7 F

















- Labs


CBC & Chem 7: 


                                 05/16/21 05:16





                                 05/15/21 04:31


Labs: 


                              Abnormal lab results











  05/15/21 05/15/21 05/16/21 Range/Units





  12:19 21:32 01:26 


 


Hgb     (10.1-14.3)  gm/dl


 


Hct     (30.3-42.9)  %


 


POC Glucose  62 L  144 H  138 H  ()  mg/dL














  05/16/21 05/16/21 05/16/21 Range/Units





  05:16 08:06 09:58 


 


Hgb  8.3 L    (10.1-14.3)  gm/dl


 


Hct  26.1 L    (30.3-42.9)  %


 


POC Glucose   54 L  60 L  ()  mg/dL

## 2021-05-16 NOTE — PROGRESS NOTE
Assessment and Plan





- Patient Problems


(1) Acute chest pain


Current Visit: Yes   Status: Acute   





(2) End-stage renal disease on peritoneal dialysis


Current Visit: Yes   Status: Acute   





Subjective


Date of service: 05/16/21


Principal diagnosis: Abdominal pain


Interval history: 





CP HAS RESOLVED,,,,,FEELS BETTER





Objective


                                   Vital Signs











  Temp Pulse Pulse Pulse Resp BP BP


 


 05/16/21 11:53  98.0 F  97 H    16   138/67


 


 05/16/21 10:58    89  89  17  


 


 05/16/21 10:09  98.1 F  89    16   143/74


 


 05/16/21 10:05   89     143/74 


 


 05/16/21 05:53   81     139/72 


 


 05/16/21 03:49  98.3 F  81    18  139/72 


 


 05/15/21 23:51  97.7 F  79    18  144/79 


 


 05/15/21 23:00   74     


 


 05/15/21 21:26   80     129/83 


 


 05/15/21 13:21   80     129/83 


 


 05/15/21 13:17   80      129/83














  Pulse Ox


 


 05/16/21 11:53  95


 


 05/16/21 10:58 


 


 05/16/21 10:09  96


 


 05/16/21 10:05 


 


 05/16/21 05:53 


 


 05/16/21 03:49  88


 


 05/15/21 23:51  95


 


 05/15/21 23:00 


 


 05/15/21 21:26 


 


 05/15/21 13:21 


 


 05/15/21 13:17 














- Physical Examination


General: No Apparent Distress


HEENT: Positive: PERRL


Neck: Positive: trachea midline


Cardiac: Positive: Reg Rate and Rhythm


Lungs: Positive: Decreased Breath Sounds (R.,,,IMPROVED)


Neuro: Positive: Grossly Intact


Abdomen: Positive: Soft


Extremities: Present: normal.  Absent: edema





- Labs and Meds


                                       CBC











  05/16/21 Range/Units





  05:16 


 


Hgb  8.3 L  (10.1-14.3)  gm/dl


 


Hct  26.1 L  (30.3-42.9)  %


 


Plt Count  266  (140-440)  K/mm3

## 2021-05-16 NOTE — PROGRESS NOTE
Assessment and Plan


-- acute hypoxic respiratory failure


Patient noted hypoxic with O2 sat 77% at RA


repeat CXR showed moderate pleural effusion, follow clinically


she completed her COVID vaccine - so doubt COVID infection 





--Right-sided pleural effusion, need thoracentesis, will order





-- Acute chest pain, likely atypical


Patient placed on telemetry.


Follow serial cardiac enzymes.


Patient placed on aspirin, sublingual nitroglycerin and IV morphine as needed 

for chest pain.


Medical management per cardiology





--NSTEMI type II


Troponin has been elevated probably secondary to the end-stage renal disease 


however patient had placed on heparin drip prior to cardiology evaluation and 

recommendation.


Medical management per cardiology, follow 2D echocardiogram


Stop heparin drip now





-- Abdominal pain, likely from peritonitis


Etiology unclear.  CT of the abdomen and pelvis has been negative.


Will place on analgesic medications as needed. cont iv abx for possible 

peritonitis


Ordered for peritoneal fluid study





--Leukocytosis with sepsis, POA


Likely from underlying acute peritonitis


Continue empiric antibiotics, follow clinically





--Diabetes mellitus type 2 with frequent hypoglycemia


Will hold long-acting insulin, monitor blood glucose QA [facility] and continue SSI


Consistent carb diet





-- End-stage renal disease on peritoneal dialysis


Patient on peritoneal dialysis.  Consult placed to nephrology for evaluation.





-- Hypertension 


Continue routine home medications and monitor vital signs closely.





-- DVT prophylaxis


Patient currently on anticoagulation.





-- Full code status


Patient is a full code.





Daily clinical course: 


5/12/21: start on empiric abx for possible peritonitis. will follow peritoneal 

fluid cx report.  Continue to trend troponin, continue heparin drip, follow 

cardiology recommendation.





5/13/21: Patient peritoneal fluid study pending, IV antibiotics changed to 

cefepime.  ID has been consulted.  Patient continued to complains of abdominal 

pain.  Cardiology recommended medical management for elevated troponin - will DC

heparin drip.  Follow 2D echo result and pending peritoneal fluid study.





5/14: cont abx for peritonitis, patient still has diffuse abd pain. noted 

hypoxic this am, order repeat CXR. order nebs 





5/15: Peritoneal/ascitic fluid study for cell count still pending, so far no 

growth in culture.  Discussed with ID and will resent peritoneal fluid for cell 

count.  Repeat chest x-ray suggestive of right-sided moderate pleural effusion 

which will need thoracentesis.  Continue to follow the patient clinically. 





5/16: Patient becoming hypoglycemic -blood glucose was at 50s this morning, will

stop Lantus for now, manage blood glucose with SSI only.  Wait for peripheral 

fluid cell count study and right chest thoracentesis.











Subjective


Date of service: 05/16/21


Principal diagnosis: Abdominal pain


Interval history: 





Patient seen and examined.  Medical records and medication list reviewed.  


No acute event overnight noted by the RN.  


Peritoneal fluid study pending


Chest x-ray results stable right-sided moderate pleural effusion - need 

thoracentesis


Discussed plan of care at bedside with patient.








Objective





- Exam


Narrative Exam: 





GENERAL:  well-developed -American female lying on bed appeared to be in 

no discomfort. 


HEENT: Normocephalic.  Atraumatic.  No conjunctival congestion or icterus. 

Patient has moist mucous membranes.


NECK: Supple.  Trachea midline.


CHEST/LUNGS: Diminished breath sound auscultated bilaterally, patient on 

supplemental O2


HEART/CARDIOVASCULAR: Regular in rate and rhythm.  S1 and S2 positive.


ABDOMEN: Abdomen is soft but diffusely tender.  Patient has normal bowel sounds.

 


SKIN: There is no rash.  Warm and dry.


NEURO:  No focal motor deficit.  Follows command.


MUSCULOSKELETAL: No joint effusion or tenderness.


EXTRIMITY: No edema, no cyanosis or clubbing.


PSYCH:  Cooperative.











- Constitutional


Vitals: 


                               Vital Signs - 12hr











  05/16/21 05/16/21 05/16/21





  03:49 05:53 10:05


 


Temperature 98.3 F  


 


Pulse Rate 81 81 89


 


Pulse Rate [   





Apical]   


 


Pulse Rate [   





From Monitor]   


 


Respiratory 18  





Rate   


 


Blood Pressure 139/72 139/72 143/74


 


Blood Pressure   





[Right]   


 


O2 Sat by Pulse 88  





Oximetry   














  05/16/21 05/16/21 05/16/21





  10:09 10:58 11:53


 


Temperature 98.1 F  98.0 F


 


Pulse Rate 89  97 H


 


Pulse Rate [  89 





Apical]   


 


Pulse Rate [  89 





From Monitor]   


 


Respiratory 16 17 16





Rate   


 


Blood Pressure   


 


Blood Pressure 143/74  138/67





[Right]   


 


O2 Sat by Pulse 96  95





Oximetry   














- Labs


CBC & Chem 7: 


                                 05/17/21 05:47





                                 05/17/21 05:47


Labs: 


                              Abnormal lab results











  05/15/21 05/15/21 05/16/21 Range/Units





  12:19 21:32 01:26 


 


Hgb     (10.1-14.3)  gm/dl


 


Hct     (30.3-42.9)  %


 


POC Glucose  62 L  144 H  138 H  ()  mg/dL














  05/16/21 05/16/21 Range/Units





  05:16 08:06 


 


Hgb  8.3 L   (10.1-14.3)  gm/dl


 


Hct  26.1 L   (30.3-42.9)  %


 


POC Glucose   54 L  ()  mg/dL














HEART Score





- HEART Score


EKG: Normal


Age: > 65


Risk factors: 1-2 risk factors


Troponin: 


                                        











Troponin T  0.102 ng/mL (0.00-0.029)  H*  05/12/21  06:00    











Troponin: > 3x normal limit





- Critical Actions


Critical Actions: 4-6 pts:12-16.6% risk of adverse cardiac event. Should be 

admitted

## 2021-05-17 LAB
%HYPO/RBC NFR BLD AUTO: (no result) %
BAND NEUTROPHILS # (MANUAL): 0 K/MM3
BUN SERPL-MCNC: 35 MG/DL (ref 7–17)
BUN/CREAT SERPL: 4 %
CALCIUM SERPL-MCNC: 7.2 MG/DL (ref 8.4–10.2)
HCT VFR BLD CALC: 26.6 % (ref 30.3–42.9)
HEMOLYSIS INDEX: 1
HGB BLD-MCNC: 8.3 GM/DL (ref 10.1–14.3)
MCHC RBC AUTO-ENTMCNC: 31 % (ref 30–34)
MCV RBC AUTO: 95 FL (ref 79–97)
MYELOCYTES # (MANUAL): 0.1 K/MM3
PLATELET # BLD: 287 K/MM3 (ref 140–440)
PROMYELOCYTES # (MANUAL): 0 K/MM3
RBC # BLD AUTO: 2.8 M/MM3 (ref 3.65–5.03)
TOTAL CELLS COUNTED BLD: 100

## 2021-05-17 RX ADMIN — INSULIN LISPRO SCH: 100 INJECTION, SOLUTION INTRAVENOUS; SUBCUTANEOUS at 17:22

## 2021-05-17 RX ADMIN — INSULIN LISPRO SCH: 100 INJECTION, SOLUTION INTRAVENOUS; SUBCUTANEOUS at 09:24

## 2021-05-17 RX ADMIN — Medication SCH ML: at 21:43

## 2021-05-17 RX ADMIN — CEFEPIME SCH MLS/HR: 1 INJECTION, POWDER, FOR SOLUTION INTRAMUSCULAR; INTRAVENOUS at 17:37

## 2021-05-17 RX ADMIN — PANTOPRAZOLE SODIUM SCH MG: 40 INJECTION, POWDER, FOR SOLUTION INTRAVENOUS at 21:42

## 2021-05-17 RX ADMIN — PANTOPRAZOLE SODIUM SCH MG: 40 INJECTION, POWDER, FOR SOLUTION INTRAVENOUS at 10:14

## 2021-05-17 RX ADMIN — CALCIUM ACETATE SCH: 667 CAPSULE ORAL at 09:25

## 2021-05-17 RX ADMIN — SODIUM CHLORIDE, SODIUM LACTATE, CALCIUM CHLORIDE, MAGNESIUM CHLORIDE AND DEXTROSE SCH: 1.5; 538; 448; 18.3; 5.08 INJECTION, SOLUTION INTRAPERITONEAL at 10:09

## 2021-05-17 RX ADMIN — ASPIRIN SCH: 325 TABLET, COATED ORAL at 13:20

## 2021-05-17 RX ADMIN — Medication SCH ML: at 10:17

## 2021-05-17 RX ADMIN — POLYETHYLENE GLYCOL 3350 SCH GM: 17 POWDER, FOR SOLUTION ORAL at 10:14

## 2021-05-17 RX ADMIN — DOCUSATE SODIUM SCH MG: 100 CAPSULE, LIQUID FILLED ORAL at 21:42

## 2021-05-17 RX ADMIN — DOCUSATE SODIUM SCH MG: 100 CAPSULE, LIQUID FILLED ORAL at 10:14

## 2021-05-17 RX ADMIN — NIFEDIPINE SCH MG: 60 TABLET, EXTENDED RELEASE ORAL at 21:42

## 2021-05-17 RX ADMIN — SODIUM CHLORIDE, SODIUM LACTATE, CALCIUM CHLORIDE, MAGNESIUM CHLORIDE AND DEXTROSE SCH: 1.5; 538; 448; 18.3; 5.08 INJECTION, SOLUTION INTRAPERITONEAL at 21:43

## 2021-05-17 RX ADMIN — CALCITRIOL SCH MCG: 0.5 CAPSULE, LIQUID FILLED ORAL at 10:17

## 2021-05-17 RX ADMIN — CALCIUM ACETATE SCH: 667 CAPSULE ORAL at 12:27

## 2021-05-17 RX ADMIN — CINACALCET HYDROCHLORIDE SCH MG: 30 TABLET, FILM COATED ORAL at 10:13

## 2021-05-17 RX ADMIN — SODIUM CHLORIDE, SODIUM LACTATE, CALCIUM CHLORIDE, MAGNESIUM CHLORIDE AND DEXTROSE SCH: 1.5; 538; 448; 18.3; 5.08 INJECTION, SOLUTION INTRAPERITONEAL at 17:22

## 2021-05-17 RX ADMIN — Medication SCH UNIT: at 10:14

## 2021-05-17 RX ADMIN — INSULIN LISPRO SCH: 100 INJECTION, SOLUTION INTRAVENOUS; SUBCUTANEOUS at 21:43

## 2021-05-17 RX ADMIN — LACTULOSE SCH GM: 20 SOLUTION ORAL at 13:19

## 2021-05-17 RX ADMIN — LACTULOSE SCH GM: 20 SOLUTION ORAL at 17:37

## 2021-05-17 RX ADMIN — INSULIN LISPRO SCH: 100 INJECTION, SOLUTION INTRAVENOUS; SUBCUTANEOUS at 12:27

## 2021-05-17 RX ADMIN — CALCIUM ACETATE SCH MG: 667 CAPSULE ORAL at 17:37

## 2021-05-17 RX ADMIN — SODIUM CHLORIDE, SODIUM LACTATE, CALCIUM CHLORIDE, MAGNESIUM CHLORIDE AND DEXTROSE SCH: 1.5; 538; 448; 18.3; 5.08 INJECTION, SOLUTION INTRAPERITONEAL at 05:36

## 2021-05-17 NOTE — PROGRESS NOTE
Assessment and Plan


-- acute hypoxic respiratory failure -resolved


Patient noted hypoxic with O2 sat 77% at RA recorded supplemental O2


repeat CXR showed moderate pleural effusion which is now improved


she completed her COVID vaccine - so doubt COVID infection 





--Right-sided pleural effusion, improved with PD





-- Acute chest pain, likely atypical


Patient placed on telemetry.


Follow serial cardiac enzymes.


Patient placed on aspirin, sublingual nitroglycerin and IV morphine as needed 

for chest pain.


Medical management per cardiology





--NSTEMI type II


Troponin has been elevated probably secondary to the end-stage renal disease 


however patient had placed on heparin drip prior to cardiology evaluation and 

recommendation.


Medical management per cardiology, follow 2D echocardiogram


Stop heparin drip now





-- Abdominal pain, due to acute Peritonitis


Etiology unclear.  CT of the abdomen and pelvis has been negative.


Will place on analgesic medications as needed. cont iv abx per ID


Ordered for peritoneal fluid study, ID recommended IV antibiotic therapy on 

discharge





--Leukocytosis with sepsis, POA


Likely from underlying acute peritonitis


Continue empiric antibiotics, follow clinically





--Diabetes mellitus type 2 with frequent hypoglycemia


Will hold long-acting insulin, monitor blood glucose QA [facility] and continue SSI


Consistent carb diet





-- End-stage renal disease on peritoneal dialysis


Patient on peritoneal dialysis.  Consult placed to nephrology for evaluation.





-- Hypertension 


Continue routine home medications and monitor vital signs closely.





--hypokalemia, replete





-- DVT prophylaxis


Patient currently on anticoagulation.





-- Full code status


Patient is a full code.





Daily clinical course: 


5/12/21: start on empiric abx for possible peritonitis. will follow peritoneal 

fluid cx report.  Continue to trend troponin, continue heparin drip, follow 

cardiology recommendation.





5/13/21: Patient peritoneal fluid study pending, IV antibiotics changed to 

cefepime.  ID has been consulted.  Patient continued to complains of abdominal 

pain.  Cardiology recommended medical management for elevated troponin - will DC

heparin drip.  Follow 2D echo result and pending peritoneal fluid study.





5/14: cont abx for peritonitis, patient still has diffuse abd pain. noted 

hypoxic this am, order repeat CXR. order nebs 





5/15: Peritoneal/ascitic fluid study for cell count still pending, so far no 

growth in culture.  Discussed with ID and will resent peritoneal fluid for cell 

count.  Repeat chest x-ray suggestive of right-sided moderate pleural effusion 

which will need thoracentesis.  Continue to follow the patient clinically. 





5/16: Patient becoming hypoglycemic -blood glucose was at 50s this morning, will

stop Lantus for now, manage blood glucose with SSI only.  Wait for peripheral 

fluid cell count study and right chest thoracentesis.





5/17: patient need iv abx to go home for peritonitis. s/p chest US today showed 

improvement of rt pleural effusion, wait for CM and nephrology to set up iv abx 

for discharge. patient has no home needs per PT. patient currently resting on 

room air





Subjective


Date of service: 05/17/21


Principal diagnosis: Abdominal pain


Interval history: 





Patient seen and examined.  Medical records and medication list reviewed.  


No acute event overnight noted by the RN.  


Patient clinically improved, white count trending down


ID recommended IV antibiotic therapy on discharge


Discussed plan of care at bedside with patient.








Objective





- Exam


Narrative Exam: 





GENERAL:  well-developed -American female lying on bed appeared to be in 

no discomfort. 


HEENT: Normocephalic.  Atraumatic.  No conjunctival congestion or icterus. Pat

ient has moist mucous membranes.


NECK: Supple.  Trachea midline.


CHEST/LUNGS: Diminished breath sound auscultated bilaterally, 


HEART/CARDIOVASCULAR: Regular in rate and rhythm.  S1 and S2 positive.


ABDOMEN: Abdomen is soft but diffusely tender.  Patient has normal bowel sounds.

 


SKIN: There is no rash.  Warm and dry.


NEURO:  No focal motor deficit.  Follows command.


MUSCULOSKELETAL: No joint effusion or tenderness.


EXTRIMITY: No edema, no cyanosis or clubbing.


PSYCH:  Cooperative.











- Constitutional


Vitals: 


                               Vital Signs - 12hr











  05/17/21 05/17/21 05/17/21





  02:00 03:22 05:32


 


Temperature  98.1 F 


 


Pulse Rate  57 L 68


 


Pulse Rate [   





Apical]   


 


Pulse Rate [ 80  





From Monitor]   


 


Respiratory 20 16 





Rate   


 


Blood Pressure  130/57 130/57


 


Blood Pressure   





[Right]   


 


O2 Sat by Pulse  98 





Oximetry   














  05/17/21 05/17/21 05/17/21





  06:00 09:10 09:48


 


Temperature  98.0 F 


 


Pulse Rate 58 L 88 


 


Pulse Rate [   72





Apical]   


 


Pulse Rate [   72





From Monitor]   


 


Respiratory  18 16





Rate   


 


Blood Pressure   


 


Blood Pressure  156/90 





[Right]   


 


O2 Sat by Pulse  94 96





Oximetry   














  05/17/21 05/17/21 05/17/21





  10:13 10:14 10:56


 


Temperature   97.8 F


 


Pulse Rate 77 77 100 H


 


Pulse Rate [   





Apical]   


 


Pulse Rate [   





From Monitor]   


 


Respiratory   18





Rate   


 


Blood Pressure 154/78 154/78 146/71


 


Blood Pressure   





[Right]   


 


O2 Sat by Pulse   94





Oximetry   














  05/17/21





  13:19


 


Temperature 


 


Pulse Rate 81


 


Pulse Rate [ 





Apical] 


 


Pulse Rate [ 





From Monitor] 


 


Respiratory 





Rate 


 


Blood Pressure 144/76


 


Blood Pressure 





[Right] 


 


O2 Sat by Pulse 





Oximetry 














- Labs


CBC & Chem 7: 


                                 05/18/21 04:39





                                 05/17/21 14:15


Labs: 


                              Abnormal lab results











  05/17/21 05/17/21 05/17/21 Range/Units





  05:47 05:47 08:29 


 


RBC  2.80 L    (3.65-5.03)  M/mm3


 


Hgb  8.3 L    (10.1-14.3)  gm/dl


 


Hct  26.6 L    (30.3-42.9)  %


 


RDW  17.7 H    (13.2-15.2)  %


 


Seg Neuts % (Manual)  79.0 H    (40.0-70.0)  %


 


Lymphocytes % (Manual)  10.0 L    (13.4-35.0)  %


 


Basophils % (Manual)  2.0 H    (0.0-1.8)  %


 


Lymphocytes # (Manual)  0.9 L    (1.2-5.4)  K/mm3


 


Basophils # (Manual)  0.2 H    (0.0-0.1)  K/mm3


 


Sodium   136 L   (137-145)  mmol/L


 


Potassium   2.9 L*   (3.6-5.0)  mmol/L


 


Chloride   89.4 L   ()  mmol/L


 


Carbon Dioxide   33 H   (22-30)  mmol/L


 


BUN   35 H   (7-17)  mg/dL


 


Creatinine   8.9 H   (0.6-1.2)  mg/dL


 


Glucose   150 H   ()  mg/dL


 


POC Glucose    129 H  ()  mg/dL


 


Calcium   7.2 L   (8.4-10.2)  mg/dL














HEART Score





- HEART Score


EKG: Normal


Age: > 65


Risk factors: 1-2 risk factors


Troponin: 


                                        











Troponin T  0.102 ng/mL (0.00-0.029)  H*  05/12/21  06:00    











Troponin: > 3x normal limit





- Critical Actions


Critical Actions: 4-6 pts:12-16.6% risk of adverse cardiac event. Should be 

admitted

## 2021-05-17 NOTE — ULTRASOUND REPORT
ULTRASOUND CHEST



HISTORY: Right pleural effusion



TECHNIQUE: Grayscale ultrasound.



FINDINGS: This examination was scheduled as an ultrasound guided right thoracentesis. Initial scans o
f the right side of the chest demonstrates only trace right pleural fluid. No pocket of fluid large e
nough for ultrasound-guided thoracentesis was identified. Ultrasound-guided thoracentesis was not per
formed.



IMPRESSION: Trace right pleural effusion too small for thoracentesis.



Signer Name: Andrew Vazquez Jr, MD 

Signed: 5/17/2021 2:43 PM

Workstation Name: [id number]

## 2021-05-17 NOTE — PROGRESS NOTE
Assessment and Plan





Assessment


* End-stage renal disease on PD


* Cathter associated peritonitis, culture negative


* SIRS


* Leukocytosis


* Anemia of ESRD


* Secondary hyperparathyroidism


* Hypertension





Recommendations


* Continue CAPD


* Continue antibiotics


* Lactulose x 2 doses ordered


* ID note reviewed - plans for resending studies noted


* Continue calcitriol


* Continue binder


* Continue antihypertensives


* Epogen TIW


* Dose medications for renal function


* Renal diet





Subjective


Date of service: 05/17/21


Principal diagnosis: Abdominal pain


Interval history: 





Patient complains of constipation.  Denies abdominal pain.  Reports PD fluid is 

clear.





Objective





- Vital Signs


Vital signs: 


                               Vital Signs - 12hr











  05/16/21 05/16/21 05/16/21





  21:32 21:33 23:00


 


Temperature   


 


Pulse Rate 88 88 69


 


Pulse Rate [   





From Monitor]   


 


Respiratory   





Rate   


 


Blood Pressure 138/76 138/76 


 


O2 Sat by Pulse   





Oximetry   














  05/16/21 05/17/21 05/17/21





  23:10 02:00 03:22


 


Temperature 98.4 F  98.1 F


 


Pulse Rate 77  57 L


 


Pulse Rate [  80 





From Monitor]   


 


Respiratory 12 20 16





Rate   


 


Blood Pressure 121/58  130/57


 


O2 Sat by Pulse 92  98





Oximetry   














  05/17/21 05/17/21





  05:32 06:00


 


Temperature  


 


Pulse Rate 68 58 L


 


Pulse Rate [  





From Monitor]  


 


Respiratory  





Rate  


 


Blood Pressure 130/57 


 


O2 Sat by Pulse  





Oximetry  














- General Appearance


General appearance: well-developed, well-nourished


EENT: ATNC


Respiratory: Present: Clear to Ascultation


Cardiology: regular, S1S2


Gastrointestinal: normal, no tenderness, no distended


Integumentary: no rash, warm and dry


Neurologic: no focal deficit, alert and oriented x3





- Lab





                                 05/18/21 04:39





                                 05/17/21 14:15


                             Most recent lab results











Calcium  7.2 mg/dL (8.4-10.2)  L  05/17/21  05:47    


 


Magnesium  1.40 mg/dL (1.7-2.3)  L  05/11/21  18:35    














Medications & Allergies





- Medications


Allergies/Adverse Reactions: 


                                    Allergies





digoxin Allergy (Verified 05/11/21 18:22)


   Hives


lisinopril Allergy (Verified 05/11/21 18:22)


   Swelling


nitroglycerin Adverse Reaction (Verified 05/11/21 18:22)


   Rash, HEADACHES


shellfish derived Adverse Reaction (Verified 05/11/21 18:22)


   Swelling








Home Medications: 


                                Home Medications











 Medication  Instructions  Recorded  Confirmed  Last Taken  Type


 


NIFEdipine XL [Procardia Xl] 60 mg PO HS 08/21/15 05/12/21 10/24/20 History


 


carvediloL [Coreg] 6.25 mg PO BID 08/21/15 05/12/21 10/24/20 History


 


Insulin Detemir (Nf) [Levemir 15 unit SQ QHS PRN 12/09/16 05/12/21 10/25/19 Hi

story





Flextouch (Nf)]     


 


cloNIDine [Catapres] 0.2 mg PO DAILY 12/09/16 05/12/21 10/24/20 History


 


Gentamicin 0.1% Top Oint(Nf) 1 applic TP TID 10/25/20 05/12/21 Unknown History





[Gentamicin 0.1% Top Oint (Nf)]     


 


Cholecalciferol Vit D3 [Vitamin D3 3,000 unit PO DAILY  tablet 10/26/20 05/12/21

 Unknown Rx





1,000 UNIT TAB]     


 


Cinacalcet [Sensipar] 90 mg PO QDAY 05/12/21 05/12/21 Unknown History


 


Hydralazine HCl 50 mg PO TID 05/12/21 05/12/21 Unknown History











Active Medications: 














Generic Name Dose Route Start Last Admin





  Trade Name Freq  PRN Reason Stop Dose Admin


 


Acetaminophen  650 mg  05/11/21 23:52 





  Acetaminophen 325 Mg Tab  PO  





  Q4H PRN  





  Pain MILD(1-3)/Fever >100.5/HA  


 


Albuterol  2.5 mg  05/14/21 12:24 





  Albuterol 2.5 Mg/3 Ml Nebu  IH  





  Q4HRT PRN  





  Shortness Of Breath  


 


Aspirin  325 mg  05/12/21 10:00  05/16/21 10:04





  Aspirin Ec 325 Mg Tab  PO   325 mg





  QDAY ANTONY   Administration


 


Bisacodyl  10 mg  05/13/21 11:33  05/14/21 22:08





  Bisacodyl 5 Mg Tab  PO   10 mg





  QDAY PRN   Administration





  Constipation  


 


Calcitriol  0.5 mcg  05/12/21 17:00  05/16/21 10:04





  Calcitriol 0.5 Mcg Cap  PO   0.5 mcg





  QDAY ANTONY   Administration


 


Calcium Acetate  1,334 mg  05/14/21 17:00  05/16/21 17:01





  Calcium Acetate 667 Mg Cap  PO   1,334 mg





  TIDWM ANTONY   Administration


 


Carvedilol  6.25 mg  05/12/21 11:00  05/16/21 21:33





  Carvedilol 6.25 Mg Tab  PO   6.25 mg





  BID ANTONY   Administration


 


Cholecalciferol  3,000 unit  05/12/21 11:00  05/16/21 10:04





  Cholecalciferol (Vit D3) 1000 Unit (25 Mcg) Tab  PO   3,000 unit





  DAILY ANTONY   Administration


 


Cinacalcet  90 mg  05/12/21 11:00  05/16/21 10:04





  Cinacalcet 30 Mg Tab  PO   90 mg





  QDAY ANTONY   Administration


 


Clonidine HCl  0.2 mg  05/12/21 11:00  05/16/21 10:05





  Clonidine 0.2 Mg Tab  PO   0.2 mg





  DAILY ANTONY   Administration


 


Dextrose  50 ml  05/11/21 23:52 





  Dextrose 50% In Water (25gm) 50 Ml Syringe  IV  





  Q30MIN PRN  





  Hypoglycemia  





  Protocol  


 


Docusate Sodium  100 mg  05/13/21 12:00  05/16/21 21:33





  Docusate Sodium 100 Mg Cap  PO   100 mg





  BID ANTONY   Administration


 


Hydralazine HCl  50 mg  05/12/21 14:00  05/17/21 05:32





  Hydralazine 25 Mg Tab  PO   50 mg





  Q8HR ANTONY   Administration


 


Hydroxyzine HCl  25 mg  05/13/21 14:05  05/16/21 18:24





  Hydroxyzine Hcl 25 Mg Tab  PO   25 mg





  Q6H PRN   Administration





  Itching  


 


Sodium Chloride  100 mls @ 999 mls/hr  05/12/21 11:00 





  Nacl 0.9%  IV  





  KYLIE PRN  





  Hypotension  


 


Cefepime HCl  1 gm in 100 mls @ 200 mls/hr  05/12/21 18:00  05/16/21 17:01





  Cefepime/Ns 1 Gm/100 Ml  IV   200 mls/hr





  Q24H ANTONY   Administration





  Protocol  


 


Insulin Human Lispro  0 unit  05/12/21 07:30  05/16/21 21:34





  Insulin Lispro 100 Unit/Ml  SUB-Q   Not Given





  [facility]  





  Protocol  


 


Magnesium Hydroxide  30 ml  05/11/21 23:52 





  Magnesium Hydroxide (Mom) Oral Liqd Udc  PO  





  Q4H PRN  





  Constipation  


 


Morphine Sulfate  2 mg  05/11/21 23:52  05/14/21 23:49





  Morphine 4 Mg/1 Ml Inj  IV   2 mg





  Q5MIN PRN   Administration





  Chest Pain  


 


Nifedipine  60 mg  05/12/21 22:00  05/16/21 21:33





  Nifedipine Xl 60 Mg Tab  PO   60 mg





  HS ANTONY   Administration


 


Ondansetron HCl  4 mg  05/11/21 23:52  05/13/21 16:54





  Ondansetron 4 Mg/2 Ml Inj  IV   4 mg





  Q8H PRN   Administration





  Nausea And Vomiting  


 


Pantoprazole Sodium  40 mg  05/16/21 10:00  05/16/21 21:32





  Pantoprazole 40 Mg Inj  IV   40 mg





  BID ANTONY   Administration


 


Peritoneal Dialysis Solution  2,000 ml  05/12/21 16:00  05/17/21 05:36





  Dialysate Lo Aneesh 1.5% Soln 2000 Ml  IP   Not Given





  Q6H ANTONY  


 


Polyethylene Glycol  17 gm  05/13/21 12:00  05/16/21 10:04





  Polyethylene Glycol 3350 17 Gm Powder  PO   17 gm





  QDAY ANTONY   Administration


 


Potassium Chloride  30 meq  05/17/21 08:00 





  Potassium Chloride Er 10 Meq Tab  PO  05/17/21 13:00 





  ONCE ANTONY  


 


Sodium Chloride  10 ml  05/12/21 10:00  05/16/21 21:33





  Sodium Chloride 0.9% 10 Ml Flush Syringe  IV   10 ml





  BID ANTONY   Administration


 


Sodium Chloride  10 ml  05/11/21 23:52 





  Sodium Chloride 0.9% 10 Ml Flush Syringe  IV  





  PRN PRN  





  LINE FLUSH  


 


Tramadol HCl  50 mg  05/11/21 23:52 





  Tramadol 50 Mg Tab  PO  





  Q6H PRN  





  Pain, Moderate (4-6)

## 2021-05-17 NOTE — PROGRESS NOTE
Assessment and Plan





Cultures:


Blood cultures no growth today


Peritoneal culture no growth today





Assessment: 70-year-old female with history of diabetes mellitus, hypertension, 

CHF, ESRD on peritoneal dialysis, admitted on 5/11/2021 secondary to epigastric 

abdominal pain for 3 days:


 


#SIRS rule out sepsis: Remains with leukocytosis; source unclear, possible PD 

associated peritonitis vs colitis.





#Abdominal pain: Not better. Suspect PD associated peritonitis.  Peritoneal 

fluid Gram stain with multiple WBCs no growth so far.  Peritoneal fluid cell 

count and differential not done. Repeat PD fluid analysis with 467 WBC. 





#Diarrhea: Resolved.





#ESRD on PD





Recommendations:


-Pending repeat PD cultures. 


-Continue pulse IV vancomycin


-Continue IV cefepime 1 g once a day renally adjusted


-Follow-up blood cultures


-White count improving. When abdominal pain improved would send with PD antib

iotics (vancomycin and either cefepime or ceftazidime) for 10 more days





Will follow.





CURT Morrissey MD


[facility] ([facility])


O: [phone]


F: [phone]





Subjective


Date of service: 05/17/21


Principal diagnosis: Abdominal pain


Interval history: 





Afebrile, normal white count. 





Objective





- Exam


Narrative Exam: 





General appearance: Alert in NAD pleasant


Eyes: anicteric sclerae, moist conjunctivae; no lid-lad


HENT: Normocephalic, Atraumatic; normal external ears, nares open, oropharynx 

clear 


Neck: supple, tracheal midline, no JVD


Lungs: CTA 


CV: RRR no murmur


Abdomen: Soft, diffuse tenderness, PD catheter exit site without erythema or 

drainage


Extremities: no edema, no cyanosis


Skin: No rash. 


Psych: not agitated


Neuro: alert and oriented x 3. Moving all extremities





- Constitutional


Vitals: 


                                   Vital Signs











Temp Pulse Resp BP Pulse Ox


 


 97.8 F   100 H  18   146/71   94 


 


 05/17/21 10:56  05/17/21 10:56  05/17/21 10:56  05/17/21 10:56  05/17/21 10:56








                           Temperature -Last 24 Hours











Temperature                    97.8 F


 


Temperature                    98.0 F


 


Temperature                    98.1 F


 


Temperature                    98.4 F


 


Temperature                    98.3 F


 


Temperature                    97.8 F

















- Labs


CBC & Chem 7: 


                                 05/17/21 05:47





                                 05/17/21 05:47


Labs: 


                              Abnormal lab results











  05/16/21 05/17/21 05/17/21 Range/Units





  09:58 05:47 05:47 


 


RBC   2.80 L   (3.65-5.03)  M/mm3


 


Hgb   8.3 L   (10.1-14.3)  gm/dl


 


Hct   26.6 L   (30.3-42.9)  %


 


RDW   17.7 H   (13.2-15.2)  %


 


Seg Neuts % (Manual)   79.0 H   (40.0-70.0)  %


 


Lymphocytes % (Manual)   10.0 L   (13.4-35.0)  %


 


Basophils % (Manual)   2.0 H   (0.0-1.8)  %


 


Lymphocytes # (Manual)   0.9 L   (1.2-5.4)  K/mm3


 


Basophils # (Manual)   0.2 H   (0.0-0.1)  K/mm3


 


Sodium    136 L  (137-145)  mmol/L


 


Potassium    2.9 L*  (3.6-5.0)  mmol/L


 


Chloride    89.4 L  ()  mmol/L


 


Carbon Dioxide    33 H  (22-30)  mmol/L


 


BUN    35 H  (7-17)  mg/dL


 


Creatinine    8.9 H  (0.6-1.2)  mg/dL


 


Glucose    150 H  ()  mg/dL


 


POC Glucose  60 L    ()  mg/dL


 


Calcium    7.2 L  (8.4-10.2)  mg/dL














  05/17/21 Range/Units





  08:29 


 


RBC   (3.65-5.03)  M/mm3


 


Hgb   (10.1-14.3)  gm/dl


 


Hct   (30.3-42.9)  %


 


RDW   (13.2-15.2)  %


 


Seg Neuts % (Manual)   (40.0-70.0)  %


 


Lymphocytes % (Manual)   (13.4-35.0)  %


 


Basophils % (Manual)   (0.0-1.8)  %


 


Lymphocytes # (Manual)   (1.2-5.4)  K/mm3


 


Basophils # (Manual)   (0.0-0.1)  K/mm3


 


Sodium   (137-145)  mmol/L


 


Potassium   (3.6-5.0)  mmol/L


 


Chloride   ()  mmol/L


 


Carbon Dioxide   (22-30)  mmol/L


 


BUN   (7-17)  mg/dL


 


Creatinine   (0.6-1.2)  mg/dL


 


Glucose   ()  mg/dL


 


POC Glucose  129 H  ()  mg/dL


 


Calcium   (8.4-10.2)  mg/dL

## 2021-05-17 NOTE — PROGRESS NOTE
Assessment and Plan





- Patient Problems


(1) Acute chest pain


Current Visit: Yes   Status: Acute   





(2) End-stage renal disease on peritoneal dialysis


Current Visit: Yes   Status: Acute   





Subjective


Date of service: 05/17/21


Principal diagnosis: Abdominal pain


Interval history: 





NO CV C/O





Objective


                                   Vital Signs











  Temp Pulse Pulse Pulse Resp BP BP


 


 05/17/21 10:14   77     154/78 


 


 05/17/21 10:13   77     154/78 


 


 05/17/21 09:48    72  72  16  


 


 05/17/21 09:10  98.0 F  88    18   156/90


 


 05/17/21 06:00   58 L     


 


 05/17/21 05:32   68     130/57 


 


 05/17/21 03:22  98.1 F  57 L    16  130/57 


 


 05/17/21 02:00     80  20  


 


 05/16/21 23:10  98.4 F  77    12  121/58 


 


 05/16/21 23:00   69     


 


 05/16/21 21:33   88     138/76 


 


 05/16/21 21:32   88     138/76 


 


 05/16/21 19:03  98.3 F  86    18  138/76 


 


 05/16/21 17:15  97.8 F  81    17   128/67


 


 05/16/21 15:00   101 H     


 


 05/16/21 14:12   66     123/59 


 


 05/16/21 11:53  98.0 F  97 H    16   138/67














  Pulse Ox


 


 05/17/21 10:14 


 


 05/17/21 10:13 


 


 05/17/21 09:48  96


 


 05/17/21 09:10  94


 


 05/17/21 06:00 


 


 05/17/21 05:32 


 


 05/17/21 03:22  98


 


 05/17/21 02:00 


 


 05/16/21 23:10  92


 


 05/16/21 23:00 


 


 05/16/21 21:33 


 


 05/16/21 21:32 


 


 05/16/21 19:03  98


 


 05/16/21 17:15  96


 


 05/16/21 15:00 


 


 05/16/21 14:12 


 


 05/16/21 11:53  95














- Physical Examination


General: No Apparent Distress


HEENT: Positive: PERRL


Neck: Positive: trachea midline


Cardiac: Positive: Reg Rate and Rhythm


Lungs: Positive: Decreased Breath Sounds (R.)


Neuro: Positive: Grossly Intact


Abdomen: Positive: Soft, Other (PD CATH.)


Extremities: Present: normal.  Absent: edema





- Labs and Meds


                                       CBC











  05/17/21 Range/Units





  05:47 


 


WBC  9.1  (4.5-11.0)  K/mm3


 


RBC  2.80 L  (3.65-5.03)  M/mm3


 


Hgb  8.3 L  (10.1-14.3)  gm/dl


 


Hct  26.6 L  (30.3-42.9)  %


 


Plt Count  287  (140-440)  K/mm3








                          Comprehensive Metabolic Panel











  05/17/21 Range/Units





  05:47 


 


Sodium  136 L  (137-145)  mmol/L


 


Potassium  2.9 L*  (3.6-5.0)  mmol/L


 


Chloride  89.4 L  ()  mmol/L


 


Carbon Dioxide  33 H  (22-30)  mmol/L


 


BUN  35 H  (7-17)  mg/dL


 


Creatinine  8.9 H  (0.6-1.2)  mg/dL


 


Glucose  150 H  ()  mg/dL


 


Calcium  7.2 L  (8.4-10.2)  mg/dL

## 2021-05-18 LAB
BUN SERPL-MCNC: 29 MG/DL (ref 7–17)
BUN/CREAT SERPL: 3 %
CALCIUM SERPL-MCNC: 6.7 MG/DL (ref 8.4–10.2)
HCT VFR BLD CALC: 25.1 % (ref 30.3–42.9)
HEMOLYSIS INDEX: 0
HGB BLD-MCNC: 8.1 GM/DL (ref 10.1–14.3)
PLATELET # BLD: 267 K/MM3 (ref 140–440)

## 2021-05-18 RX ADMIN — CINACALCET HYDROCHLORIDE SCH MG: 30 TABLET, FILM COATED ORAL at 10:41

## 2021-05-18 RX ADMIN — POLYETHYLENE GLYCOL 3350 SCH GM: 17 POWDER, FOR SOLUTION ORAL at 10:46

## 2021-05-18 RX ADMIN — PANTOPRAZOLE SODIUM SCH MG: 40 INJECTION, POWDER, FOR SOLUTION INTRAVENOUS at 21:32

## 2021-05-18 RX ADMIN — INSULIN LISPRO SCH: 100 INJECTION, SOLUTION INTRAVENOUS; SUBCUTANEOUS at 17:24

## 2021-05-18 RX ADMIN — SODIUM CHLORIDE, SODIUM LACTATE, CALCIUM CHLORIDE, MAGNESIUM CHLORIDE AND DEXTROSE SCH: 1.5; 538; 448; 18.3; 5.08 INJECTION, SOLUTION INTRAPERITONEAL at 22:24

## 2021-05-18 RX ADMIN — DOCUSATE SODIUM SCH MG: 100 CAPSULE, LIQUID FILLED ORAL at 10:46

## 2021-05-18 RX ADMIN — PANTOPRAZOLE SODIUM SCH MG: 40 INJECTION, POWDER, FOR SOLUTION INTRAVENOUS at 10:47

## 2021-05-18 RX ADMIN — INSULIN LISPRO SCH: 100 INJECTION, SOLUTION INTRAVENOUS; SUBCUTANEOUS at 07:30

## 2021-05-18 RX ADMIN — SODIUM CHLORIDE, SODIUM LACTATE, CALCIUM CHLORIDE, MAGNESIUM CHLORIDE AND DEXTROSE SCH: 1.5; 538; 448; 18.3; 5.08 INJECTION, SOLUTION INTRAPERITONEAL at 13:11

## 2021-05-18 RX ADMIN — CALCIUM ACETATE SCH MG: 667 CAPSULE ORAL at 10:45

## 2021-05-18 RX ADMIN — CALCITRIOL SCH MCG: 0.5 CAPSULE, LIQUID FILLED ORAL at 10:46

## 2021-05-18 RX ADMIN — INSULIN LISPRO SCH: 100 INJECTION, SOLUTION INTRAVENOUS; SUBCUTANEOUS at 22:20

## 2021-05-18 RX ADMIN — INSULIN LISPRO SCH: 100 INJECTION, SOLUTION INTRAVENOUS; SUBCUTANEOUS at 11:30

## 2021-05-18 RX ADMIN — CALCIUM ACETATE SCH MG: 667 CAPSULE ORAL at 17:19

## 2021-05-18 RX ADMIN — Medication SCH UNIT: at 10:45

## 2021-05-18 RX ADMIN — ASPIRIN SCH MG: 325 TABLET, COATED ORAL at 10:47

## 2021-05-18 RX ADMIN — Medication SCH ML: at 21:33

## 2021-05-18 RX ADMIN — NIFEDIPINE SCH MG: 60 TABLET, EXTENDED RELEASE ORAL at 21:31

## 2021-05-18 RX ADMIN — CEFEPIME SCH MLS/HR: 1 INJECTION, POWDER, FOR SOLUTION INTRAMUSCULAR; INTRAVENOUS at 17:18

## 2021-05-18 RX ADMIN — Medication SCH ML: at 10:48

## 2021-05-18 RX ADMIN — CALCIUM ACETATE SCH MG: 667 CAPSULE ORAL at 13:02

## 2021-05-18 RX ADMIN — DOCUSATE SODIUM SCH: 100 CAPSULE, LIQUID FILLED ORAL at 22:24

## 2021-05-18 NOTE — PROGRESS NOTE
Assessment and Plan





Chest pain, atypical


   EKG showing normal sinus rhythm, normal ECG. 


   Troponin levels were elevated at 0.1, in the setting of renal disease. 

Unchanged over 3 serial measurements. 


   Echo this presentations demonstrates normal LVEF. There is mild to moderate 

TR with at least moderate pulmonary HTN.


End-stage renal disease, on peritoneal dialysis.


Nausea, vomiting, abdominal pain - chief complaint 


Leukocytosis


   undergoing evaluation and treatment for possible peritonitis.


Anemia





Conservative cardiac management.





Subjective


Date of service: 05/18/21


Principal diagnosis: Abdominal pain


Interval history: 





Patient is resting in bed comfortable. No distress noted. 





Objective


                                   Vital Signs











  Temp Pulse Resp BP Pulse Ox


 


 05/18/21 10:46   97 H   156/83 


 


 05/18/21 08:15  98.3 F  97 H  18  156/83  94


 


 05/18/21 04:17  98.3 F  85  18  124/63  98


 


 05/18/21 00:05  98.5 F  80  18  140/59  95


 


 05/18/21 00:00   66   


 


 05/17/21 19:21  98.1 F  78  20  148/71  96


 


 05/17/21 16:50  98.3 F  92 H  18  159/72  93


 


 05/17/21 15:00   62   


 


 05/17/21 13:19   81   144/76 














- Physical Examination


General: No Apparent Distress


HEENT: Positive: PERRL


Neck: Positive: trachea midline


Neuro: Positive: Grossly Intact


Abdomen: Positive: Soft, Other (PD CATH.)


Extremities: Present: normal.  Absent: edema





- Labs and Meds


                                       CBC











  05/18/21 Range/Units





  04:39 


 


Hgb  8.1 L  (10.1-14.3)  gm/dl


 


Hct  25.1 L  (30.3-42.9)  %


 


Plt Count  267  (140-440)  K/mm3








                          Comprehensive Metabolic Panel











  05/17/21 Range/Units





  14:15 


 


Potassium  3.1 L  (3.6-5.0)  mmol/L

## 2021-05-18 NOTE — PROGRESS NOTE
Assessment and Plan





Assessment


* End-stage renal disease on PD


* Cathter associated peritonitis, culture negative


* SIRS


* Leukocytosis


* Anemia of ESRD


* Secondary hyperparathyroidism


* Hypertension


* Hypokalemia





Recommendations


* Continue CAPD


* Lactulose and Simethicone x 1 dose today


* Continue antibiotics - (will plan for IP Vanco/Fortaz after discharge)


* ID note reviewed


* Continue calcitriol


* Continue binder


* Continue antihypertensives


* Epogen TIW


* Dose medications for renal function


* Renal diet


* AM labs





Subjective


Date of service: 05/18/21


Principal diagnosis: Abdominal pain


Interval history: 





Reports stool yesterday following Lactulose.  Reports gas.  Remains constipated 

but feels improved.





Objective





- Vital Signs


Vital signs: 


                               Vital Signs - 12hr











  05/18/21 05/18/21 05/18/21





  00:00 00:05 04:17


 


Temperature  98.5 F 98.3 F


 


Pulse Rate 66 80 85


 


Respiratory  18 18





Rate   


 


Blood Pressure  140/59 124/63


 


O2 Sat by Pulse  95 98





Oximetry   














  05/18/21





  08:15


 


Temperature 98.3 F


 


Pulse Rate 97 H


 


Respiratory 18





Rate 


 


Blood Pressure 156/83


 


O2 Sat by Pulse 94





Oximetry 














- General Appearance


General appearance: well-developed, well-nourished


EENT: ATNC


Respiratory: Present: Clear to Ascultation


Cardiology: regular, S1S2


Gastrointestinal: normal, no tenderness, no distended


Integumentary: no rash, warm and dry


Neurologic: no focal deficit, alert and oriented x3





- Lab





                                 05/18/21 04:39





                                 05/17/21 14:15


                             Most recent lab results











Calcium  7.2 mg/dL (8.4-10.2)  L  05/17/21  05:47    


 


Magnesium  1.40 mg/dL (1.7-2.3)  L  05/11/21  18:35    














Medications & Allergies





- Medications


Allergies/Adverse Reactions: 


                                    Allergies





digoxin Allergy (Verified 05/11/21 18:22)


   Hives


lisinopril Allergy (Verified 05/11/21 18:22)


   Swelling


nitroglycerin Adverse Reaction (Verified 05/11/21 18:22)


   Rash, HEADACHES


shellfish derived Adverse Reaction (Verified 05/11/21 18:22)


   Swelling








Home Medications: 


                                Home Medications











 Medication  Instructions  Recorded  Confirmed  Last Taken  Type


 


NIFEdipine XL [Procardia Xl] 60 mg PO HS 08/21/15 05/12/21 10/24/20 History


 


carvediloL [Coreg] 6.25 mg PO BID 08/21/15 05/12/21 10/24/20 History


 


Insulin Detemir (Nf) [Levemir 15 unit SQ QHS PRN 12/09/16 05/12/21 10/25/19 

History





Flextouch (Nf)]     


 


cloNIDine [Catapres] 0.2 mg PO DAILY 12/09/16 05/12/21 10/24/20 History


 


Gentamicin 0.1% Top Oint(Nf) 1 applic TP TID 10/25/20 05/12/21 Unknown History





[Gentamicin 0.1% Top Oint (Nf)]     


 


Cholecalciferol Vit D3 [Vitamin D3 3,000 unit PO DAILY  tablet 10/26/20 05/12/21

 Unknown Rx





1,000 UNIT TAB]     


 


Cinacalcet [Sensipar] 90 mg PO QDAY 05/12/21 05/12/21 Unknown History


 


Hydralazine HCl 50 mg PO TID 05/12/21 05/12/21 Unknown History











Active Medications: 














Generic Name Dose Route Start Last Admin





  Trade Name Freq  PRN Reason Stop Dose Admin


 


Acetaminophen  650 mg  05/11/21 23:52 





  Acetaminophen 325 Mg Tab  PO  





  Q4H PRN  





  Pain MILD(1-3)/Fever >100.5/HA  


 


Albuterol  2.5 mg  05/14/21 12:24 





  Albuterol 2.5 Mg/3 Ml Nebu  IH  





  Q4HRT PRN  





  Shortness Of Breath  


 


Aspirin  325 mg  05/12/21 10:00  05/17/21 13:20





  Aspirin Ec 325 Mg Tab  PO   Not Given





  QDAY ANTONY  


 


Bisacodyl  10 mg  05/13/21 11:33  05/14/21 22:08





  Bisacodyl 5 Mg Tab  PO   10 mg





  QDAY PRN   Administration





  Constipation  


 


Calcitriol  0.5 mcg  05/12/21 17:00  05/17/21 10:17





  Calcitriol 0.5 Mcg Cap  PO   0.5 mcg





  QDAY ANTONY   Administration


 


Calcium Acetate  1,334 mg  05/14/21 17:00  05/17/21 17:37





  Calcium Acetate 667 Mg Cap  PO   1,334 mg





  TIDWM ANTONY   Administration


 


Carvedilol  6.25 mg  05/12/21 11:00  05/17/21 21:42





  Carvedilol 6.25 Mg Tab  PO   6.25 mg





  BID ANTONY   Administration


 


Cholecalciferol  3,000 unit  05/12/21 11:00  05/17/21 10:14





  Cholecalciferol (Vit D3) 1000 Unit (25 Mcg) Tab  PO   3,000 unit





  DAILY ANTONY   Administration


 


Cinacalcet  90 mg  05/12/21 11:00  05/17/21 10:13





  Cinacalcet 30 Mg Tab  PO   90 mg





  QDAY ANTONY   Administration


 


Clonidine HCl  0.2 mg  05/12/21 11:00  05/17/21 10:13





  Clonidine 0.2 Mg Tab  PO   0.2 mg





  DAILY ANTONY   Administration


 


Dextrose  50 ml  05/11/21 23:52 





  Dextrose 50% In Water (25gm) 50 Ml Syringe  IV  





  Q30MIN PRN  





  Hypoglycemia  





  Protocol  


 


Docusate Sodium  100 mg  05/13/21 12:00  05/17/21 21:42





  Docusate Sodium 100 Mg Cap  PO   100 mg





  BID ANTONY   Administration


 


Hydralazine HCl  50 mg  05/12/21 14:00  05/18/21 05:07





  Hydralazine 25 Mg Tab  PO   50 mg





  Q8HR ANTONY   Administration


 


Hydroxyzine HCl  25 mg  05/13/21 14:05  05/16/21 18:24





  Hydroxyzine Hcl 25 Mg Tab  PO   25 mg





  Q6H PRN   Administration





  Itching  


 


Sodium Chloride  100 mls @ 999 mls/hr  05/12/21 11:00 





  Nacl 0.9%  IV  





  KYLIE PRN  





  Hypotension  


 


Cefepime HCl  1 gm in 100 mls @ 200 mls/hr  05/12/21 18:00  05/17/21 17:37





  Cefepime/Ns 1 Gm/100 Ml  IV  05/27/21 18:29  200 mls/hr





  Q24H ANTONY   Administration





  Protocol  


 


Insulin Human Lispro  0 unit  05/12/21 07:30  05/17/21 21:43





  Insulin Lispro 100 Unit/Ml  SUB-Q   Not Given





  [facility] ANTONY  





  Protocol  


 


Magnesium Hydroxide  30 ml  05/11/21 23:52 





  Magnesium Hydroxide (Mom) Oral Liqd Udc  PO  





  Q4H PRN  





  Constipation  


 


Morphine Sulfate  2 mg  05/11/21 23:52  05/14/21 23:49





  Morphine 4 Mg/1 Ml Inj  IV   2 mg





  Q5MIN PRN   Administration





  Chest Pain  


 


Nifedipine  60 mg  05/12/21 22:00  05/17/21 21:42





  Nifedipine Xl 60 Mg Tab  PO   60 mg





  HS ANTONY   Administration


 


Ondansetron HCl  4 mg  05/11/21 23:52  05/13/21 16:54





  Ondansetron 4 Mg/2 Ml Inj  IV   4 mg





  Q8H PRN   Administration





  Nausea And Vomiting  


 


Pantoprazole Sodium  40 mg  05/16/21 10:00  05/17/21 21:42





  Pantoprazole 40 Mg Inj  IV   40 mg





  BID ANTONY   Administration


 


Peritoneal Dialysis Solution  2,000 ml  05/12/21 16:00  05/17/21 21:43





  Dialysate Lo Aneesh 1.5% Soln 2000 Ml  IP   Not Given





  Q6H ANTONY  


 


Polyethylene Glycol  17 gm  05/13/21 12:00  05/17/21 10:14





  Polyethylene Glycol 3350 17 Gm Powder  PO   17 gm





  QDAY ANTONY   Administration


 


Sodium Chloride  10 ml  05/12/21 10:00  05/17/21 21:43





  Sodium Chloride 0.9% 10 Ml Flush Syringe  IV   10 ml





  BID ANTONY   Administration


 


Sodium Chloride  10 ml  05/11/21 23:52 





  Sodium Chloride 0.9% 10 Ml Flush Syringe  IV  





  PRN PRN  





  LINE FLUSH  


 


Tramadol HCl  50 mg  05/11/21 23:52  05/18/21 04:26





  Tramadol 50 Mg Tab  PO   50 mg





  Q6H PRN   Administration





  Pain, Moderate (4-6)

## 2021-05-18 NOTE — PROGRESS NOTE
Assessment and Plan





Cultures:


Blood cultures no growth today


Peritoneal culture Negative





Assessment: 70-year-old female with history of diabetes mellitus, hypertension, 

CHF, ESRD on peritoneal dialysis, admitted on 5/11/2021 secondary to epigastric 

abdominal pain for 3 days:


 


#SIRS rule out sepsis: Remains with leukocytosis; source unclear, possible PD 

associated peritonitis vs colitis.





#Abdominal pain: Not better. Suspect PD associated peritonitis.  Peritoneal 

fluid Gram stain with multiple WBCs no growth so far.  Peritoneal fluid cell 

count and differential not done. Repeat PD fluid analysis with 467 WBC. 





#Diarrhea: Resolved.





#ESRD on PD





Recommendations:


-Pending repeat PD cultures. 


-Continue pulse IV vancomycin


-Continue IV cefepime 1 g once a day renally adjusted


-Follow-up blood cultures


-When abdominal pain improved would send with PD antibiotics (vancomycin and 

either cefepime or ceftazidime) for 10 more days. Appreciate nephrology's 

assistance. 





Will follow.





CURT Morrissey MD


[facility] ([facility])


O: [phone]


F: [phone]





Subjective


Date of service: 05/18/21


Principal diagnosis: Abdominal pain


Interval history: 





Afebrile, normal white count. PD cultures are final negative. 





Objective





- Exam


Narrative Exam: 





General appearance: Alert in NAD pleasant


Eyes: anicteric sclerae, moist conjunctivae; no lid-lad


HENT: Normocephalic, Atraumatic; normal external ears, nares open, oropharynx 

clear 


Neck: supple, tracheal midline, no JVD


Lungs: CTA 


CV: RRR no murmur


Abdomen: Soft, diffuse tenderness, PD catheter exit site without erythema or 

drainage


Extremities: no edema, no cyanosis


Skin: No rash. 


Psych: not agitated


Neuro: alert and oriented x 3. Moving all extremities





- Constitutional


Vitals: 


                                   Vital Signs











Temp Pulse Resp BP Pulse Ox


 


 97.5 F L  97 H  18   144/74   94 


 


 05/18/21 11:10  05/18/21 11:10  05/18/21 11:10  05/18/21 11:10  05/18/21 11:10








                           Temperature -Last 24 Hours











Temperature                    97.5 F


 


Temperature                    98.3 F


 


Temperature                    98.3 F


 


Temperature                    98.5 F


 


Temperature                    98.1 F


 


Temperature                    98.3 F

















- Labs


CBC & Chem 7: 


                                 05/18/21 04:39





                                 05/17/21 14:15


Labs: 


                              Abnormal lab results











  05/17/21 05/17/21 05/18/21 Range/Units





  14:15 20:56 04:39 


 


Hgb    8.1 L  (10.1-14.3)  gm/dl


 


Hct    25.1 L  (30.3-42.9)  %


 


Potassium  3.1 L    (3.6-5.0)  mmol/L


 


POC Glucose   142 H   ()  mg/dL














  05/18/21 05/18/21 Range/Units





  08:17 11:12 


 


Hgb    (10.1-14.3)  gm/dl


 


Hct    (30.3-42.9)  %


 


Potassium    (3.6-5.0)  mmol/L


 


POC Glucose  121 H  122 H  ()  mg/dL

## 2021-05-18 NOTE — PROGRESS NOTE
Assessment and Plan


-- acute hypoxic respiratory failure -resolved


Patient noted hypoxic with O2 sat 77% at RA recorded supplemental O2


repeat CXR showed moderate pleural effusion which is now improved


she completed her COVID vaccine - so doubt COVID infection 





--Right-sided pleural effusion, improved with PD





-- Acute chest pain, likely atypical


Patient placed on telemetry.


Follow serial cardiac enzymes.


Patient placed on aspirin, sublingual nitroglycerin and IV morphine as needed 

for chest pain.


Medical management per cardiology





--NSTEMI type II


Troponin has been elevated probably secondary to the end-stage renal disease 


however patient had placed on heparin drip prior to cardiology evaluation and 

recommendation.


Medical management per cardiology, follow 2D echocardiogram


Stop heparin drip now





-- Abdominal pain, due to acute Peritonitis


Etiology unclear.  CT of the abdomen and pelvis has been negative.


Will place on analgesic medications as needed. cont iv abx per ID


Ordered for peritoneal fluid study, ID recommended IV antibiotic therapy on 

discharge





--Leukocytosis with sepsis, POA


Likely from underlying acute peritonitis


Continue empiric antibiotics, follow clinically





--Diabetes mellitus type 2 with frequent hypoglycemia


Will hold long-acting insulin, monitor blood glucose QA [facility] and continue SSI


Consistent carb diet





-- End-stage renal disease on peritoneal dialysis


Patient on peritoneal dialysis.  Consult placed to nephrology for evaluation.





-- Hypertension 


Continue routine home medications and monitor vital signs closely.





--hypokalemia, replete





-- DVT prophylaxis


Patient currently on anticoagulation.





-- Full code status


Patient is a full code.





Daily clinical course: 


5/12/21: start on empiric abx for possible peritonitis. will follow peritoneal 

fluid cx report.  Continue to trend troponin, continue heparin drip, follow 

cardiology recommendation.





5/13/21: Patient peritoneal fluid study pending, IV antibiotics changed to 

cefepime.  ID has been consulted.  Patient continued to complains of abdominal 

pain.  Cardiology recommended medical management for elevated troponin - will DC

heparin drip.  Follow 2D echo result and pending peritoneal fluid study.





5/14: cont abx for peritonitis, patient still has diffuse abd pain. noted 

hypoxic this am, order repeat CXR. order nebs 





5/15: Peritoneal/ascitic fluid study for cell count still pending, so far no 

growth in culture.  Discussed with ID and will resent peritoneal fluid for cell 

count.  Repeat chest x-ray suggestive of right-sided moderate pleural effusion 

which will need thoracentesis.  Continue to follow the patient clinically. 





5/16: Patient becoming hypoglycemic -blood glucose was at 50s this morning, will

stop Lantus for now, manage blood glucose with SSI only.  Wait for peripheral 

fluid cell count study and right chest thoracentesis.





5/17: patient need iv abx to go home for peritonitis. s/p chest US today showed 

improvement of rt pleural effusion and did not require thoracentesis., wait for 

CM and nephrology to set up iv abx for discharge. patient has no home needs per 

PT. patient currently resting on room air





Subjective


Date of service: 05/18/21


Principal diagnosis: Abdominal pain


Interval history: 





Patient seen and examined.  Medical records and medication list reviewed.  


No acute event overnight noted by the RN.  


Patient clinically improved, white count trending down


ID recommended IV antibiotic therapy on discharge


Discussed plan of care at bedside with patient.








Objective





- Exam


Narrative Exam: 





GENERAL:  well-developed -American female lying on bed appeared to be in 

no discomfort. 


HEENT: Normocephalic.  Atraumatic.  No conjunctival congestion or icterus. 

Patient has moist mucous membranes.


NECK: Supple.  Trachea midline.


CHEST/LUNGS: Diminished breath sound auscultated bilaterally, 


HEART/CARDIOVASCULAR: Regular in rate and rhythm.  S1 and S2 positive.


ABDOMEN: Abdomen is soft but diffusely tender.  Patient has normal bowel sounds.

 


SKIN: There is no rash.  Warm and dry.


NEURO:  No focal motor deficit.  Follows command.


MUSCULOSKELETAL: No joint effusion or tenderness.


EXTRIMITY: No edema, no cyanosis or clubbing.


PSYCH:  Cooperative.











- Constitutional


Vitals: 


                               Vital Signs - 12hr











  05/18/21 05/18/21 05/18/21





  04:17 08:15 10:46


 


Temperature 98.3 F 98.3 F 


 


Pulse Rate 85 97 H 97 H


 


Respiratory 18 18 





Rate   


 


Blood Pressure 124/63 156/83 156/83


 


O2 Sat by Pulse 98 94 





Oximetry   














  05/18/21





  11:10


 


Temperature 97.5 F L


 


Pulse Rate 97 H


 


Respiratory 18





Rate 


 


Blood Pressure 144/74


 


O2 Sat by Pulse 94





Oximetry 














- Labs


CBC & Chem 7: 


                                 05/19/21 05:28





                                 05/19/21 05:28


Labs: 


                              Abnormal lab results











  05/17/21 05/17/21 05/18/21 Range/Units





  14:15 20:56 04:39 


 


Hgb    8.1 L  (10.1-14.3)  gm/dl


 


Hct    25.1 L  (30.3-42.9)  %


 


Potassium  3.1 L    (3.6-5.0)  mmol/L


 


Chloride     ()  mmol/L


 


Carbon Dioxide     (22-30)  mmol/L


 


BUN     (7-17)  mg/dL


 


Creatinine     (0.6-1.2)  mg/dL


 


Glucose     ()  mg/dL


 


POC Glucose   142 H   ()  mg/dL


 


Calcium     (8.4-10.2)  mg/dL














  05/18/21 05/18/21 05/18/21 Range/Units





  08:17 11:12 13:59 


 


Hgb     (10.1-14.3)  gm/dl


 


Hct     (30.3-42.9)  %


 


Potassium    3.5 L  (3.6-5.0)  mmol/L


 


Chloride    93.3 L  ()  mmol/L


 


Carbon Dioxide    32 H  (22-30)  mmol/L


 


BUN    29 H  (7-17)  mg/dL


 


Creatinine    9.1 H  (0.6-1.2)  mg/dL


 


Glucose    101 H  ()  mg/dL


 


POC Glucose  121 H  122 H   ()  mg/dL


 


Calcium    6.7 L  (8.4-10.2)  mg/dL














HEART Score





- HEART Score


EKG: Normal


Age: > 65


Risk factors: 1-2 risk factors


Troponin: 


                                        











Troponin T  0.102 ng/mL (0.00-0.029)  H*  05/12/21  06:00    











Troponin: > 3x normal limit





- Critical Actions


Critical Actions: 4-6 pts:12-16.6% risk of adverse cardiac event. Should be 

admitted

## 2021-05-19 ENCOUNTER — OUT OF OFFICE VISIT (OUTPATIENT)
Dept: URBAN - METROPOLITAN AREA MEDICAL CENTER 41 | Facility: MEDICAL CENTER | Age: 71
End: 2021-05-19
Payer: MEDICARE

## 2021-05-19 DIAGNOSIS — K21.9 ACID REFLUX: ICD-10-CM

## 2021-05-19 DIAGNOSIS — R10.84 ABDOMINAL CRAMPING, GENERALIZED: ICD-10-CM

## 2021-05-19 LAB
BUN SERPL-MCNC: 29 MG/DL (ref 7–17)
BUN/CREAT SERPL: 3 %
CALCIUM SERPL-MCNC: 7.3 MG/DL (ref 8.4–10.2)
HCT VFR BLD CALC: 25 % (ref 30.3–42.9)
HEMOLYSIS INDEX: 7
HGB BLD-MCNC: 8.1 GM/DL (ref 10.1–14.3)
MCHC RBC AUTO-ENTMCNC: 32 % (ref 30–34)
MCV RBC AUTO: 94 FL (ref 79–97)
PLATELET # BLD: 268 K/MM3 (ref 140–440)
RBC # BLD AUTO: 2.66 M/MM3 (ref 3.65–5.03)

## 2021-05-19 PROCEDURE — 99222 1ST HOSP IP/OBS MODERATE 55: CPT | Performed by: INTERNAL MEDICINE

## 2021-05-19 PROCEDURE — G8427 DOCREV CUR MEDS BY ELIG CLIN: HCPCS | Performed by: INTERNAL MEDICINE

## 2021-05-19 RX ADMIN — CINACALCET HYDROCHLORIDE SCH MG: 30 TABLET, FILM COATED ORAL at 10:30

## 2021-05-19 RX ADMIN — CALCIUM ACETATE SCH MG: 667 CAPSULE ORAL at 08:00

## 2021-05-19 RX ADMIN — NIFEDIPINE SCH MG: 60 TABLET, EXTENDED RELEASE ORAL at 21:18

## 2021-05-19 RX ADMIN — CALCIUM ACETATE SCH MG: 667 CAPSULE ORAL at 11:32

## 2021-05-19 RX ADMIN — INSULIN LISPRO SCH: 100 INJECTION, SOLUTION INTRAVENOUS; SUBCUTANEOUS at 17:12

## 2021-05-19 RX ADMIN — POTASSIUM CHLORIDE SCH MEQ: 1500 TABLET, EXTENDED RELEASE ORAL at 10:29

## 2021-05-19 RX ADMIN — PANTOPRAZOLE SODIUM SCH MG: 40 INJECTION, POWDER, FOR SOLUTION INTRAVENOUS at 10:29

## 2021-05-19 RX ADMIN — Medication SCH UNIT: at 10:29

## 2021-05-19 RX ADMIN — POTASSIUM CHLORIDE SCH MEQ: 1500 TABLET, EXTENDED RELEASE ORAL at 11:32

## 2021-05-19 RX ADMIN — CEFEPIME SCH MLS/HR: 1 INJECTION, POWDER, FOR SOLUTION INTRAMUSCULAR; INTRAVENOUS at 17:14

## 2021-05-19 RX ADMIN — CALCITRIOL SCH MCG: 0.5 CAPSULE, LIQUID FILLED ORAL at 10:29

## 2021-05-19 RX ADMIN — DOCUSATE SODIUM SCH MG: 100 CAPSULE, LIQUID FILLED ORAL at 21:18

## 2021-05-19 RX ADMIN — SODIUM CHLORIDE, SODIUM LACTATE, CALCIUM CHLORIDE, MAGNESIUM CHLORIDE AND DEXTROSE SCH: 1.5; 538; 448; 18.3; 5.08 INJECTION, SOLUTION INTRAPERITONEAL at 07:55

## 2021-05-19 RX ADMIN — INSULIN LISPRO SCH: 100 INJECTION, SOLUTION INTRAVENOUS; SUBCUTANEOUS at 21:16

## 2021-05-19 RX ADMIN — CALCIUM ACETATE SCH MG: 667 CAPSULE ORAL at 17:14

## 2021-05-19 RX ADMIN — INSULIN LISPRO SCH: 100 INJECTION, SOLUTION INTRAVENOUS; SUBCUTANEOUS at 10:31

## 2021-05-19 RX ADMIN — SODIUM CHLORIDE, SODIUM LACTATE, CALCIUM CHLORIDE, MAGNESIUM CHLORIDE AND DEXTROSE SCH: 1.5; 538; 448; 18.3; 5.08 INJECTION, SOLUTION INTRAPERITONEAL at 10:31

## 2021-05-19 RX ADMIN — SODIUM CHLORIDE, SODIUM LACTATE, CALCIUM CHLORIDE, MAGNESIUM CHLORIDE AND DEXTROSE SCH: 1.5; 538; 448; 18.3; 5.08 INJECTION, SOLUTION INTRAPERITONEAL at 06:48

## 2021-05-19 RX ADMIN — Medication SCH: at 10:30

## 2021-05-19 RX ADMIN — SODIUM CHLORIDE, SODIUM LACTATE, CALCIUM CHLORIDE, MAGNESIUM CHLORIDE AND DEXTROSE SCH: 1.5; 538; 448; 18.3; 5.08 INJECTION, SOLUTION INTRAPERITONEAL at 15:59

## 2021-05-19 RX ADMIN — POLYETHYLENE GLYCOL 3350 SCH GM: 17 POWDER, FOR SOLUTION ORAL at 10:30

## 2021-05-19 RX ADMIN — DOCUSATE SODIUM SCH MG: 100 CAPSULE, LIQUID FILLED ORAL at 10:29

## 2021-05-19 RX ADMIN — INSULIN LISPRO SCH: 100 INJECTION, SOLUTION INTRAVENOUS; SUBCUTANEOUS at 12:21

## 2021-05-19 RX ADMIN — Medication SCH ML: at 21:18

## 2021-05-19 RX ADMIN — SODIUM CHLORIDE, SODIUM LACTATE, CALCIUM CHLORIDE, MAGNESIUM CHLORIDE AND DEXTROSE SCH: 1.5; 538; 448; 18.3; 5.08 INJECTION, SOLUTION INTRAPERITONEAL at 22:06

## 2021-05-19 RX ADMIN — ASPIRIN SCH MG: 325 TABLET, COATED ORAL at 10:30

## 2021-05-19 RX ADMIN — PANTOPRAZOLE SODIUM SCH: 40 INJECTION, POWDER, FOR SOLUTION INTRAVENOUS at 10:33

## 2021-05-19 RX ADMIN — PANTOPRAZOLE SODIUM SCH MG: 40 TABLET, DELAYED RELEASE ORAL at 17:14

## 2021-05-19 NOTE — PROGRESS NOTE
Assessment and Plan





Chest pain, atypical


   EKG showing normal sinus rhythm, normal ECG. 


   Troponin levels were elevated at 0.1, in the setting of renal disease. 

Unchanged over 3 serial measurements. 


   Echo this presentations demonstrates normal LVEF. There is mild to moderate 

TR with at least moderate pulmonary HTN.


End-stage renal disease, on peritoneal dialysis.


Nausea, vomiting, abdominal pain - chief complaint 


Leukocytosis


   undergoing evaluation and treatment for suspected peritonitis.


Anemia





Conservative cardiac management.





Subjective


Date of service: 05/19/21


Principal diagnosis: Abdominal pain


Interval history: 





Patient is resting in bed comfortable. Reports she is feeling better.


Denies chest pain and denies SOB.





Objective


                                   Vital Signs











  Temp Pulse Pulse Pulse Resp BP Pulse Ox


 


 05/19/21 08:06  98.3 F  99 H    18  161/53  98


 


 05/19/21 06:49       143/64 


 


 05/19/21 04:43  98.0 F  91 H    18  143/64  94


 


 05/19/21 00:07  98.9 F  87    18  180/76  96


 


 05/18/21 21:31   94 H     169/84 


 


 05/18/21 19:54  98.6 F  94 H    18  169/84  95


 


 05/18/21 16:30  97.9 F  89    18  154/82  95


 


 05/18/21 14:00    78  78   


 


 05/18/21 11:10  97.5 F L  97 H    18  144/74  94














- Physical Examination


General: No Apparent Distress


HEENT: Positive: PERRL


Neck: Positive: trachea midline


Cardiac: Positive: Reg Rate and Rhythm


Lungs: Positive: Decreased Breath Sounds


Neuro: Positive: Grossly Intact


Abdomen: Positive: Soft, Other (PD CATH.)


Extremities: Absent: edema





- Labs and Meds


                                       CBC











  05/19/21 Range/Units





  05:28 


 


WBC  10.8  (4.5-11.0)  K/mm3


 


RBC  2.66 L  (3.65-5.03)  M/mm3


 


Hgb  8.1 L  (10.1-14.3)  gm/dl


 


Hct  25.0 L  (30.3-42.9)  %


 


Plt Count  268  (140-440)  K/mm3








                          Comprehensive Metabolic Panel











  05/18/21 05/19/21 Range/Units





  13:59 05:28 


 


Sodium  137  137  (137-145)  mmol/L


 


Potassium  3.5 L  2.7 L* D  (3.6-5.0)  mmol/L


 


Chloride  93.3 L  94.9 L  ()  mmol/L


 


Carbon Dioxide  32 H  31 H  (22-30)  mmol/L


 


BUN  29 H  29 H  (7-17)  mg/dL


 


Creatinine  9.1 H  8.7 H  (0.6-1.2)  mg/dL


 


Glucose  101 H  93  ()  mg/dL


 


Calcium  6.7 L  7.3 L  (8.4-10.2)  mg/dL

## 2021-05-19 NOTE — EVENT NOTE
Date: 05/19/21


Full consult dictated


- asked to see pt for abdominal pain and gerd


- all GI symptoms resolved


- see no need for further GI input at this time


- call if needed

## 2021-05-19 NOTE — PROGRESS NOTE
Assessment and Plan


-- acute hypoxic respiratory failure -resolved


Patient noted hypoxic with O2 sat 77% at RA recorded supplemental O2


repeat CXR showed moderate pleural effusion which is now improved


she completed her COVID vaccine - so doubt COVID infection 





--Right-sided pleural effusion, improved with PD





-- Acute chest pain, likely atypical


Patient placed on telemetry.


Follow serial cardiac enzymes.


Patient placed on aspirin, sublingual nitroglycerin and IV morphine as needed 

for chest pain.


Medical management per cardiology





--NSTEMI type II


Troponin has been elevated probably secondary to the end-stage renal disease 


however patient had placed on heparin drip prior to cardiology evaluation and 

recommendation.


Medical management per cardiology, follow 2D echocardiogram


Stop heparin drip now





-- Abdominal pain, due to acute Peritonitis


Etiology unclear.  CT of the abdomen and pelvis has been negative.


Will place on analgesic medications as needed. cont iv abx per ID


Ordered for peritoneal fluid study, ID recommended IV antibiotic therapy on 

discharge





--Leukocytosis with sepsis, POA


Likely from underlying acute peritonitis


Continue empiric antibiotics, follow clinically





--Diabetes mellitus type 2 with frequent hypoglycemia


Will hold long-acting insulin, monitor blood glucose QA [facility] [place] and continue SSI


Consistent carb diet





-- End-stage renal disease on peritoneal dialysis


Patient on peritoneal dialysis.  Consult placed to nephrology for evaluation.





-- Hypertension 


Continue routine home medications and monitor vital signs closely.





--hypokalemia, replete





-- DVT prophylaxis


Patient currently on anticoagulation.





-- Full code status


Patient is a full code.





Daily clinical course: 


5/12/21: start on empiric abx for possible peritonitis. will follow peritoneal 

fluid cx report.  Continue to trend troponin, continue heparin drip, follow 

cardiology recommendation.





5/13/21: Patient peritoneal fluid study pending, IV antibiotics changed to 

cefepime.  ID has been consulted.  Patient continued to complains of abdominal 

pain.  Cardiology recommended medical management for elevated troponin - will DC

heparin drip.  Follow 2D echo result and pending peritoneal fluid study.





5/14: cont abx for peritonitis, patient still has diffuse abd pain. noted 

hypoxic this am, order repeat CXR. order nebs 





5/15: Peritoneal/ascitic fluid study for cell count still pending, so far no 

growth in culture.  Discussed with ID and will resent peritoneal fluid for cell 

count.  Repeat chest x-ray suggestive of right-sided moderate pleural effusion 

which will need thoracentesis.  Continue to follow the patient clinically. 





5/16: Patient becoming hypoglycemic -blood glucose was at 50s this morning, will

stop Lantus for now, manage blood glucose with SSI only.  Wait for peripheral 

fluid cell count study and right chest thoracentesis.





5/17: patient need iv abx to go home for PERITONITIS.  s/p chest US showed impr

ovement of rt pleural effusion and did not require thoracentesis., wait for CM 

and nephrology to set up iv abx for discharge. patient has no home needs per PT.

patient currently resting on room air





5/18: Patient complains of difficulty swallowing and very poor appetite.  Also 

complains of abdominal distention pain.  We will continue PPI and will consult 

GI for further recommendation.  Pending IV antibiotics set up for peritonitis.





5/19: Patient states that her appetite significantly improved.  Had a good BM 

this morning.  Resting on room air.  Waiting on IV antibiotics set up on 

discharge.











Subjective


Date of service: 05/19/21


Principal diagnosis: Abdominal pain


Interval history: 





Patient seen and examined.  Medical records and medication list reviewed.  


No acute event overnight noted by the RN.  


Patient clinically improved, white count trending down


ID recommended IV antibiotic therapy on discharge


Patient states that she had a bowel movement today and she is tolerating diet 

better


Discussed plan of care at bedside with patient.








Objective





- Exam


Narrative Exam: 





GENERAL:  well-developed -American female lying on bed appeared to be in 

no discomfort. 


HEENT: Normocephalic.  Atraumatic.  No conjunctival congestion or icterus. 

Patient has moist mucous membranes.


NECK: Supple.  Trachea midline.


CHEST/LUNGS: Diminished breath sound auscultated bilaterally, 


HEART/CARDIOVASCULAR: Regular in rate and rhythm.  S1 and S2 positive.


ABDOMEN: Abdomen is soft but diffusely tender.  Patient has normal bowel sounds.

 


SKIN: There is no rash.  Warm and dry.


NEURO:  No focal motor deficit.  Follows command.


MUSCULOSKELETAL: No joint effusion or tenderness.


EXTRIMITY: No edema, no cyanosis or clubbing.


PSYCH:  Cooperative.











- Constitutional


Vitals: 


                               Vital Signs - 12hr











  05/19/21 05/19/21 05/19/21





  00:07 04:43 06:49


 


Temperature 98.9 F 98.0 F 


 


Pulse Rate 87 91 H 


 


Respiratory 18 18 





Rate   


 


Blood Pressure 180/76 143/64 143/64


 


O2 Sat by Pulse 96 94 





Oximetry   














  05/19/21 05/19/21





  08:06 11:07


 


Temperature 98.3 F 


 


Pulse Rate 99 H 88


 


Respiratory 18 





Rate  


 


Blood Pressure 161/53 


 


O2 Sat by Pulse 98 





Oximetry  














- Labs


CBC & Chem 7: 


                                 05/20/21 04:31





                                 05/20/21 04:31


Labs: 


                              Abnormal lab results











  05/18/21 05/18/21 05/19/21 Range/Units





  13:59 16:32 05:28 


 


RBC    2.66 L  (3.65-5.03)  M/mm3


 


Hgb    8.1 L  (10.1-14.3)  gm/dl


 


Hct    25.0 L  (30.3-42.9)  %


 


RDW    17.7 H  (13.2-15.2)  %


 


Potassium  3.5 L    (3.6-5.0)  mmol/L


 


Chloride  93.3 L    ()  mmol/L


 


Carbon Dioxide  32 H    (22-30)  mmol/L


 


BUN  29 H    (7-17)  mg/dL


 


Creatinine  9.1 H    (0.6-1.2)  mg/dL


 


Glucose  101 H    ()  mg/dL


 


POC Glucose   107 H   ()  mg/dL


 


Calcium  6.7 L    (8.4-10.2)  mg/dL














  05/19/21 Range/Units





  05:28 


 


RBC   (3.65-5.03)  M/mm3


 


Hgb   (10.1-14.3)  gm/dl


 


Hct   (30.3-42.9)  %


 


RDW   (13.2-15.2)  %


 


Potassium  2.7 L* D  (3.6-5.0)  mmol/L


 


Chloride  94.9 L  ()  mmol/L


 


Carbon Dioxide  31 H  (22-30)  mmol/L


 


BUN  29 H  (7-17)  mg/dL


 


Creatinine  8.7 H  (0.6-1.2)  mg/dL


 


Glucose   ()  mg/dL


 


POC Glucose   ()  mg/dL


 


Calcium  7.3 L  (8.4-10.2)  mg/dL














HEART Score





- HEART Score


EKG: Normal


Age: > 65


Risk factors: 1-2 risk factors


Troponin: 


                                        











Troponin T  0.102 ng/mL (0.00-0.029)  H*  05/12/21  06:00    











Troponin: > 3x normal limit





- Critical Actions


Critical Actions: 4-6 pts:12-16.6% risk of adverse cardiac event. Should be 

admitted

## 2021-05-19 NOTE — PROGRESS NOTE
Assessment and Plan





Cultures:


Blood cultures no growth today


Peritoneal culture Negative





Assessment: 70-year-old female with history of diabetes mellitus, hypertension, 

CHF, ESRD on peritoneal dialysis, admitted on 5/11/2021 secondary to epigastric 

abdominal pain for 3 days:


 


#SIRS rule out sepsis: Remains with leukocytosis; source unclear, possible PD 

associated peritonitis vs colitis.





#Abdominal pain: Not better. Suspect PD associated peritonitis.  Peritoneal 

fluid Gram stain with multiple WBCs no growth so far.  Peritoneal fluid cell 

count and differential not done. Repeat PD fluid analysis with 467 WBC. 





#Diarrhea: Resolved.





#ESRD on PD





Recommendations:


-Pending repeat PD cultures. 


-Continue pulse IV vancomycin


-Continue IV cefepime 1 g once a day renally adjusted


-Follow-up blood cultures


-When abdominal pain improved would send with PD antibiotics (vancomycin and 

either cefepime or ceftazidime) for 10 more days. Appreciate nephrology's 

assistance. 





Will follow.





CURT Morrissey MD


[facility] ([facility] ([facility])


O: [phone]


F: [phone]





Subjective


Date of service: 05/19/21


Principal diagnosis: Abdominal pain


Interval history: 





Afebrile with a normal white count.  No acute change.





Objective





- Exam


Narrative Exam: 





General appearance: Alert in NAD pleasant


Eyes: anicteric sclerae, moist conjunctivae; no lid-lad


HENT: Normocephalic, Atraumatic; normal external ears, nares open, oropharynx 

clear 


Neck: supple, tracheal midline, no JVD


Lungs: CTA 


CV: RRR no murmur


Abdomen: Soft, diffuse tenderness, PD catheter exit site without erythema or 

drainage


Extremities: no edema, no cyanosis


Skin: No rash. 


Psych: not agitated


Neuro: alert and oriented x 3. Moving all extremities





- Constitutional


Vitals: 


                                   Vital Signs











Temp Pulse Resp BP Pulse Ox


 


 98.3 F   88   18   161/53   98 


 


 05/19/21 08:06  05/19/21 11:07  05/19/21 08:06  05/19/21 08:06  05/19/21 08:06








                           Temperature -Last 24 Hours











Temperature                    98.3 F


 


Temperature                    98.0 F


 


Temperature                    98.9 F


 


Temperature                    98.6 F


 


Temperature                    97.9 F

















- Labs


CBC & Chem 7: 


                                 05/19/21 05:28





                                 05/19/21 12:54


Labs: 


                              Abnormal lab results











  05/18/21 05/19/21 05/19/21 Range/Units





  16:32 05:28 05:28 


 


RBC   2.66 L   (3.65-5.03)  M/mm3


 


Hgb   8.1 L   (10.1-14.3)  gm/dl


 


Hct   25.0 L   (30.3-42.9)  %


 


RDW   17.7 H   (13.2-15.2)  %


 


Potassium    2.7 L* D  (3.6-5.0)  mmol/L


 


Chloride    94.9 L  ()  mmol/L


 


Carbon Dioxide    31 H  (22-30)  mmol/L


 


BUN    29 H  (7-17)  mg/dL


 


Creatinine    8.7 H  (0.6-1.2)  mg/dL


 


POC Glucose  107 H    ()  mg/dL


 


Calcium    7.3 L  (8.4-10.2)  mg/dL

## 2021-05-19 NOTE — PROGRESS NOTE
Assessment and Plan





Assessment


* End-stage renal disease on PD


* Cathter associated peritonitis, culture negative


* SIRS


* Leukocytosis


* Anemia of ESRD


* Secondary hyperparathyroidism


* Hypertension


* Hypokalemia





Recommendations


* Continue CAPD


* Replete KCl 40meq x 2 doses


* Continue antibiotics - (will plan for IP Vanco/Fortaz after discharge)


* ID note reviewed


* Continue calcitriol


* Continue binder


* Continue antihypertensives


* Epogen TIW


* Dose medications for renal function


* Renal diet


* AM labs





Subjective


Date of service: 05/19/21


Principal diagnosis: Abdominal pain


Interval history: 





Patient reports she feels "100% better".  Reports constipation has resolved.  

Complains of gas





Objective





- Vital Signs


Vital signs: 


                               Vital Signs - 12hr











  05/18/21 05/19/21 05/19/21





  21:31 00:07 04:43


 


Temperature  98.9 F 98.0 F


 


Pulse Rate 94 H 87 91 H


 


Respiratory  18 18





Rate   


 


Blood Pressure 169/84 180/76 143/64


 


O2 Sat by Pulse  96 94





Oximetry   














  05/19/21 05/19/21





  06:49 08:06


 


Temperature  98.3 F


 


Pulse Rate  99 H


 


Respiratory  18





Rate  


 


Blood Pressure 143/64 161/53


 


O2 Sat by Pulse  98





Oximetry  














- General Appearance


General appearance: well-developed, well-nourished


EENT: ATNC


Respiratory: Present: Clear to Ascultation


Cardiology: regular, S1S2


Gastrointestinal: normal, no tenderness, no distended


Neurologic: no focal deficit, alert and oriented x3


Psychiatric: cooperative





- Lab





                                 05/19/21 05:28





                                 05/19/21 05:28


                             Most recent lab results











Calcium  7.3 mg/dL (8.4-10.2)  L  05/19/21  05:28    


 


Magnesium  1.40 mg/dL (1.7-2.3)  L  05/11/21  18:35    














Medications & Allergies





- Medications


Allergies/Adverse Reactions: 


                                    Allergies





digoxin Allergy (Verified 05/11/21 18:22)


   Hives


lisinopril Allergy (Verified 05/11/21 18:22)


   Swelling


nitroglycerin Adverse Reaction (Verified 05/11/21 18:22)


   Rash, HEADACHES


shellfish derived Adverse Reaction (Verified 05/11/21 18:22)


   Swelling








Home Medications: 


                                Home Medications











 Medication  Instructions  Recorded  Confirmed  Last Taken  Type


 


NIFEdipine XL [Procardia Xl] 60 mg PO HS 08/21/15 05/12/21 10/24/20 History


 


carvediloL [Coreg] 6.25 mg PO BID 08/21/15 05/12/21 10/24/20 History


 


Insulin Detemir (Nf) [Levemir 15 unit SQ QHS PRN 12/09/16 05/12/21 10/25/19 

History





Flextouch (Nf)]     


 


cloNIDine [Catapres] 0.2 mg PO DAILY 12/09/16 05/12/21 10/24/20 History


 


Gentamicin 0.1% Top Oint(Nf) 1 applic TP TID 10/25/20 05/12/21 Unknown History





[Gentamicin 0.1% Top Oint (Nf)]     


 


Cholecalciferol Vit D3 [Vitamin D3 3,000 unit PO DAILY  tablet 10/26/20 05/12/21

 Unknown Rx





1,000 UNIT TAB]     


 


Cinacalcet [Sensipar] 90 mg PO QDAY 05/12/21 05/12/21 Unknown History


 


Hydralazine HCl 50 mg PO TID 05/12/21 05/12/21 Unknown History











Active Medications: 














Generic Name Dose Route Start Last Admin





  Trade Name Freq  PRN Reason Stop Dose Admin


 


Acetaminophen  650 mg  05/11/21 23:52 





  Acetaminophen 325 Mg Tab  PO  





  Q4H PRN  





  Pain MILD(1-3)/Fever >100.5/HA  


 


Albuterol  2.5 mg  05/14/21 12:24 





  Albuterol 2.5 Mg/3 Ml Nebu  IH  





  Q4HRT PRN  





  Shortness Of Breath  


 


Aspirin  325 mg  05/12/21 10:00  05/18/21 10:47





  Aspirin Ec 325 Mg Tab  PO   325 mg





  QDAY ANTONY   Administration


 


Bisacodyl  10 mg  05/13/21 11:33  05/14/21 22:08





  Bisacodyl 5 Mg Tab  PO   10 mg





  QDAY PRN   Administration





  Constipation  


 


Calcitriol  0.5 mcg  05/12/21 17:00  05/18/21 10:46





  Calcitriol 0.5 Mcg Cap  PO   0.5 mcg





  QDAY ANTONY   Administration


 


Calcium Acetate  1,334 mg  05/14/21 17:00  05/19/21 08:00





  Calcium Acetate 667 Mg Cap  PO   1,334 mg





  TIDWM ANTONY   Administration


 


Carvedilol  6.25 mg  05/12/21 11:00  05/18/21 21:31





  Carvedilol 6.25 Mg Tab  PO   6.25 mg





  BID ANTONY   Administration


 


Cholecalciferol  3,000 unit  05/12/21 11:00  05/18/21 10:45





  Cholecalciferol (Vit D3) 1000 Unit (25 Mcg) Tab  PO   3,000 unit





  DAILY ANTONY   Administration


 


Cinacalcet  90 mg  05/12/21 11:00  05/18/21 10:41





  Cinacalcet 30 Mg Tab  PO   90 mg





  QDAY ANTONY   Administration


 


Clonidine HCl  0.2 mg  05/12/21 11:00  05/18/21 10:47





  Clonidine 0.2 Mg Tab  PO   0.2 mg





  DAILY ANTONY   Administration


 


Dextrose  50 ml  05/11/21 23:52 





  Dextrose 50% In Water (25gm) 50 Ml Syringe  IV  





  Q30MIN PRN  





  Hypoglycemia  





  Protocol  


 


Docusate Sodium  100 mg  05/13/21 12:00  05/18/21 22:24





  Docusate Sodium 100 Mg Cap  PO   Not Given





  BID ANTONY  


 


Hydralazine HCl  50 mg  05/12/21 14:00  05/19/21 06:49





  Hydralazine 25 Mg Tab  PO   50 mg





  Q8HR ANTONY   Administration


 


Hydroxyzine HCl  25 mg  05/13/21 14:05  05/19/21 01:33





  Hydroxyzine Hcl 25 Mg Tab  PO   25 mg





  Q6H PRN   Administration





  Itching  


 


Sodium Chloride  100 mls @ 999 mls/hr  05/12/21 11:00 





  Nacl 0.9%  IV  





  KYLIE PRN  





  Hypotension  


 


Cefepime HCl  1 gm in 100 mls @ 200 mls/hr  05/12/21 18:00  05/18/21 17:18





  Cefepime/Ns 1 Gm/100 Ml  IV  05/27/21 18:29  200 mls/hr





  Q24H ANTONY   Administration





  Protocol  


 


Insulin Human Lispro  0 unit  05/12/21 07:30  05/18/21 22:20





  Insulin Lispro 100 Unit/Ml  SUB-Q   Not Given





  [facility]  





  Protocol  


 


Magnesium Hydroxide  30 ml  05/11/21 23:52 





  Magnesium Hydroxide (Mom) Oral Liqd Udc  PO  





  Q4H PRN  





  Constipation  


 


Morphine Sulfate  2 mg  05/11/21 23:52  05/14/21 23:49





  Morphine 4 Mg/1 Ml Inj  IV   2 mg





  Q5MIN PRN   Administration





  Chest Pain  


 


Nifedipine  60 mg  05/12/21 22:00  05/18/21 21:31





  Nifedipine Xl 60 Mg Tab  PO   60 mg





  HS ANTONY   Administration


 


Ondansetron HCl  4 mg  05/11/21 23:52  05/13/21 16:54





  Ondansetron 4 Mg/2 Ml Inj  IV   4 mg





  Q8H PRN   Administration





  Nausea And Vomiting  


 


Pantoprazole Sodium  40 mg  05/16/21 10:00  05/18/21 21:32





  Pantoprazole 40 Mg Inj  IV   40 mg





  BID ANTONY   Administration


 


Peritoneal Dialysis Solution  2,000 ml  05/12/21 16:00  05/19/21 07:55





  Dialysate Lo Aneesh 1.5% Soln 2000 Ml  IP   Not Given





  Q6H ANTONY  


 


Polyethylene Glycol  17 gm  05/13/21 12:00  05/18/21 10:46





  Polyethylene Glycol 3350 17 Gm Powder  PO   17 gm





  QDAY ANTONY   Administration


 


Potassium Chloride  40 meq  05/19/21 10:00 





  Potassium Chloride Er 20 Meq Tab  PO  05/19/21 12:01 





  Q2H ANTONY  


 


Sodium Chloride  10 ml  05/12/21 10:00  05/18/21 21:33





  Sodium Chloride 0.9% 10 Ml Flush Syringe  IV   10 ml





  BID ANTONY   Administration


 


Sodium Chloride  10 ml  05/11/21 23:52  05/18/21 17:18





  Sodium Chloride 0.9% 10 Ml Flush Syringe  IV   10 ml





  PRN PRN   Administration





  LINE FLUSH  


 


Tramadol HCl  50 mg  05/11/21 23:52  05/19/21 03:54





  Tramadol 50 Mg Tab  PO   50 mg





  Q6H PRN   Administration





  Pain, Moderate (4-6)

## 2021-05-20 VITALS — SYSTOLIC BLOOD PRESSURE: 148 MMHG | DIASTOLIC BLOOD PRESSURE: 67 MMHG

## 2021-05-20 LAB
BUN SERPL-MCNC: 30 MG/DL (ref 7–17)
BUN/CREAT SERPL: 3 %
CALCIUM SERPL-MCNC: 7.5 MG/DL (ref 8.4–10.2)
HCT VFR BLD CALC: 23.7 % (ref 30.3–42.9)
HEMOLYSIS INDEX: 0
HGB BLD-MCNC: 7.6 GM/DL (ref 10.1–14.3)
MCHC RBC AUTO-ENTMCNC: 32 % (ref 30–34)
MCV RBC AUTO: 94 FL (ref 79–97)
PLATELET # BLD: 289 K/MM3 (ref 140–440)
RBC # BLD AUTO: 2.51 M/MM3 (ref 3.65–5.03)

## 2021-05-20 RX ADMIN — POLYETHYLENE GLYCOL 3350 SCH GM: 17 POWDER, FOR SOLUTION ORAL at 09:53

## 2021-05-20 RX ADMIN — INSULIN LISPRO SCH: 100 INJECTION, SOLUTION INTRAVENOUS; SUBCUTANEOUS at 12:51

## 2021-05-20 RX ADMIN — INSULIN LISPRO SCH: 100 INJECTION, SOLUTION INTRAVENOUS; SUBCUTANEOUS at 08:02

## 2021-05-20 RX ADMIN — SODIUM CHLORIDE, SODIUM LACTATE, CALCIUM CHLORIDE, MAGNESIUM CHLORIDE AND DEXTROSE SCH: 1.5; 538; 448; 18.3; 5.08 INJECTION, SOLUTION INTRAPERITONEAL at 12:50

## 2021-05-20 RX ADMIN — Medication SCH UNIT: at 09:53

## 2021-05-20 RX ADMIN — SIMETHICONE PRN MG: 80 TABLET, CHEWABLE ORAL at 06:22

## 2021-05-20 RX ADMIN — CALCIUM ACETATE SCH: 667 CAPSULE ORAL at 13:23

## 2021-05-20 RX ADMIN — PANTOPRAZOLE SODIUM SCH MG: 40 TABLET, DELAYED RELEASE ORAL at 10:05

## 2021-05-20 RX ADMIN — SIMETHICONE PRN MG: 80 TABLET, CHEWABLE ORAL at 00:06

## 2021-05-20 RX ADMIN — ASPIRIN SCH MG: 325 TABLET, COATED ORAL at 09:53

## 2021-05-20 RX ADMIN — SODIUM CHLORIDE, SODIUM LACTATE, CALCIUM CHLORIDE, MAGNESIUM CHLORIDE AND DEXTROSE SCH: 1.5; 538; 448; 18.3; 5.08 INJECTION, SOLUTION INTRAPERITONEAL at 17:47

## 2021-05-20 RX ADMIN — CALCITRIOL SCH MCG: 0.5 CAPSULE, LIQUID FILLED ORAL at 09:53

## 2021-05-20 RX ADMIN — DOCUSATE SODIUM SCH MG: 100 CAPSULE, LIQUID FILLED ORAL at 09:53

## 2021-05-20 RX ADMIN — INSULIN LISPRO SCH: 100 INJECTION, SOLUTION INTRAVENOUS; SUBCUTANEOUS at 17:47

## 2021-05-20 RX ADMIN — Medication SCH ML: at 09:54

## 2021-05-20 RX ADMIN — SODIUM CHLORIDE, SODIUM LACTATE, CALCIUM CHLORIDE, MAGNESIUM CHLORIDE AND DEXTROSE SCH: 1.5; 538; 448; 18.3; 5.08 INJECTION, SOLUTION INTRAPERITONEAL at 08:26

## 2021-05-20 RX ADMIN — CINACALCET HYDROCHLORIDE SCH MG: 30 TABLET, FILM COATED ORAL at 09:53

## 2021-05-20 RX ADMIN — CALCIUM ACETATE SCH MG: 667 CAPSULE ORAL at 09:53

## 2021-05-20 RX ADMIN — CALCIUM ACETATE SCH: 667 CAPSULE ORAL at 17:48

## 2021-05-20 RX ADMIN — PANTOPRAZOLE SODIUM SCH: 40 TABLET, DELAYED RELEASE ORAL at 17:48

## 2021-05-20 NOTE — PROGRESS NOTE
Assessment and Plan





Cultures:


Blood cultures no growth today


Peritoneal culture Negative





Assessment: 70-year-old female with history of diabetes mellitus, hypertension, 

CHF, ESRD on peritoneal dialysis, admitted on 5/11/2021 secondary to epigastric 

abdominal pain for 3 days:


 


#SIRS rule out sepsis: Remains with leukocytosis; source unclear, possible PD 

associated peritonitis vs colitis.





#Abdominal pain: Not better. Suspect PD associated peritonitis.  Peritoneal 

fluid Gram stain with multiple WBCs no growth so far.  Peritoneal fluid cell 

count and differential not done. Repeat PD fluid analysis with 467 WBC. 





#Diarrhea: Resolved.





#ESRD on PD





Recommendations:


-Pending repeat PD cultures. 


-Continue pulse IV vancomycin


-Continue IV cefepime 1 g once a day renally adjusted


-When abdominal pain improved would send with PD antibiotics (vancomycin and 

either cefepime or ceftazidime) for 10 more days. Appreciate nephrology's 

assistance. 





Will follow.





CURT Morrissey MD


[facility] ([facility])


O: [phone]


F: [phone]





Subjective


Date of service: 05/20/21


Principal diagnosis: Abdominal pain


Interval history: 





Afebrile, normal white count.  Cultures are negative.  No acute change.





Objective





- Exam


Narrative Exam: 





General appearance: Alert in NAD pleasant


Eyes: anicteric sclerae, moist conjunctivae; no lid-lad


HENT: Normocephalic, Atraumatic; normal external ears, nares open, oropharynx 

clear 


Neck: supple, tracheal midline, no JVD


Lungs: CTA 


CV: RRR no murmur


Abdomen: Soft, diffuse tenderness, PD catheter exit site without erythema or 

drainage


Extremities: no edema, no cyanosis


Skin: No rash. 


Psych: not agitated


Neuro: alert and oriented x 3. Moving all extremities





- Constitutional


Vitals: 


                                   Vital Signs











Temp Pulse Resp BP Pulse Ox


 


 98.5 F   74   18   145/74   98 


 


 05/20/21 11:41  05/20/21 12:52  05/20/21 12:54  05/20/21 11:41  05/20/21 12:54








                           Temperature -Last 24 Hours











Temperature                    98.5 F


 


Temperature                    98.4 F


 


Temperature                    98.0 F


 


Temperature                    98.5 F


 


Temperature                    98.2 F

















- Labs


CBC & Chem 7: 


                                 05/20/21 04:31





                                 05/20/21 04:31


Labs: 


                              Abnormal lab results











  05/19/21 05/20/21 05/20/21 Range/Units





  11:23 04:31 04:31 


 


RBC   2.51 L   (3.65-5.03)  M/mm3


 


Hgb   7.6 L   (10.1-14.3)  gm/dl


 


Hct   23.7 L   (30.3-42.9)  %


 


RDW   17.7 H   (13.2-15.2)  %


 


Chloride    97.9 L  ()  mmol/L


 


Carbon Dioxide    31 H  (22-30)  mmol/L


 


BUN    30 H  (7-17)  mg/dL


 


Creatinine    8.9 H  (0.6-1.2)  mg/dL


 


POC Glucose  107 H    ()  mg/dL


 


Calcium    7.5 L  (8.4-10.2)  mg/dL

## 2021-05-20 NOTE — DISCHARGE SUMMARY
Providers





- Providers


Date of Admission: 


05/12/21 12:28





Date of discharge: 05/20/21


Attending physician: 


SALOME CONNELL





                                        





05/11/21


Consult to Cardiac Rehabilitation [CONS] Routine 


   Reason For Exam: Phase I





05/11/21 22:04


Consult to Physician [CONS] Routine 


   Comment: Dr. Ontiveros spoke with Dr. Daniel @ [phone]


   Consulting Provider: BASSEM DANIEL


   Physician Instructions: 


   Reason For Exam: ESRD on nightly peritoneal dialysis





05/11/21 23:54


Consult to Cardiology [CONS] Routine 


   Consulting Provider: BRET CORONA


   Reason For Exam: Chest Pain


Consult to Dietitian/Nutrition [CONS] Routine 


   Physician Instructions: 


   Reason For Exam: 


   Reason for Consult: Diet education





05/12/21 15:03


Consult to Physician [CONS] Routine 


   Comment: 


   Consulting Provider: MEMO ROBERT


   Physician Instructions: 


   Reason For Exam: Peritonitis?





05/14/21 12:17


Physical Therapy Evaluation and Treat [CONS] Routine 


   Comment: 


   Reason For Exam: Debility





05/18/21 17:52


Consult to Physician [CONS] Routine 


   Comment: 


   Consulting Provider: MICKEY HOUSE


   Physician Instructions: 


   Reason For Exam: Indigestion











Primary care physician: 


KIARRA ABAD








Hospitalization


Condition: Good


Pertinent studies: 





Chest x-ray, abdomen x-ray, chest  ultrasound








Hospital course: 


70-year-old -American female with known history of diabetes mellitus, 

CHF, hypertension, end-stage renal disease on peritoneal dialysis presented to 

the emergency room on 5/11/21 complaining of abdominal pain and midsternal chest

 pain. Work-up in the emergency room: chest x-ray and EKG were unremarkable.  

She had a negative CT scan of the abdomen and pelvis without contrast a day 

prior to this presentation. Troponin was elevated at 0.103.


Patient was admitted for chest pain and abdominal pain evaluation.





Daily clinical course: 


5/12/21: start on empiric abx for possible peritonitis. will follow peritoneal 

fluid cx report.  Continue to trend troponin, continue heparin drip, follow 

cardiology recommendation.





5/13/21: Patient peritoneal fluid study pending, IV antibiotics changed to 

cefepime.  ID has been consulted.  Patient continued to complains of abdominal 

pain.  Cardiology recommended medical management for elevated troponin - will DC

 heparin drip.  Follow 2D echo result and pending peritoneal fluid study.





5/14: cont abx for peritonitis, patient still has diffuse abd pain. noted 

hypoxic this am, order repeat CXR. order nebs 





5/15: Peritoneal/ascitic fluid study for cell count still pending, so far no 

growth in culture.  Discussed with ID and will resent peritoneal fluid for cell 

count.  Repeat chest x-ray suggestive of right-sided moderate pleural effusion 

which will need thoracentesis.  Continue to follow the patient clinically. 





5/16: Patient becoming hypoglycemic -blood glucose was at 50s this morning, will

 stop Lantus for now, manage blood glucose with SSI only.  Wait for peripheral 

fluid cell count study and right chest thoracentesis.





5/17: patient need iv abx to go home for PERITONITIS.  s/p chest US showed 

improvement of rt pleural effusion and did not require thoracentesis., wait for 

CM and nephrology to set up iv abx for discharge. patient has no home needs per 

PT. patient currently resting on room air





5/18: Patient complains of difficulty swallowing and very poor appetite.  Also 

complains of abdominal distention pain.  We will continue PPI and will consult 

GI for further recommendation.  Pending IV antibiotics set up for peritonitis.





5/19: Patient states that her appetite significantly improved.  Had a good BM 

this morning.  Resting on room air.  Waiting on IV antibiotics set up on 

discharge.





Assessment and plan


-- Acute hypoxic respiratory failure -resolved


Likely due to volume overload from pleural effusion


Patient noted hypoxic with O2 sat 77% at RA recorded supplemental O2


CXR showed moderate pleural effusion which is now improved on repeat imaging 

study


she completed her COVID vaccine - so doubt COVID infection 





--Right-sided pleural effusion, improved with PD





-- Acute chest pain, likely atypical


Patient placed on telemetry.


Follow serial cardiac enzymes.


Patient placed on aspirin, sublingual nitroglycerin and IV morphine as needed 

for chest pain.


Medical management per cardiology





--NSTEMI type II


Troponin has been elevated probably secondary to the end-stage renal disease 


however patient had placed on heparin drip prior to cardiology evaluation and 

recommendation.


Medical management per cardiology, follow 2D echocardiogram


Stop heparin drip now





-- Abdominal pain, due to acute Peritonitis


Etiology unclear.  CT of the abdomen and pelvis has been negative.


Will place on analgesic medications as needed. cont iv abx per ID


Ordered for peritoneal fluid study, ID recommended IV antibiotic therapy on 

discharge





--Leukocytosis with sepsis, POA


Likely from underlying acute peritonitis


Continue empiric antibiotics, follow clinically





--Diabetes mellitus type 2 with frequent hypoglycemia


Will hold long-acting insulin, monitor blood glucose QA [facility] and continue SSI


Consistent carb diet





-- End-stage renal disease on peritoneal dialysis


Patient on peritoneal dialysis.  Consult placed to nephrology for evaluation.





-- Hypertension 


Continue routine home medications and monitor vital signs closely.





--hypokalemia, replete





-- DVT prophylaxis


Patient currently on anticoagulation.





-- Full code status


Patient is a full code.








Disposition: DC-01 TO HOME OR SELFCARE


Final Discharge Diagnosis (Prints w/discharge instructions): --Acute hypoxic 

respiratory failure, resolved.  --Right-sided pleural effusion, improved with 

PD.  --Acute chest pain, atypical due to right-sided pleural effusion.  --NSTEMI

 type II due to end-stage renal disease.  --Acute peritonitis,POA.  --Abdominal 

pain due to acute peritonitis.  --Leukocytosis with sepsis due to peritonitis.  

--Diabetes mellitus type 2 with frequent hypoglycemia.  --[place] renal disease on

 PD.  --Hypertension.  --Hypokalemia, repleted


Time spent for discharge: 38 minutes





Core Measure Documentation





- Palliative Care


Palliative Care/ Comfort Measures: Not Applicable





- Core Measures


Any of the following diagnoses?: none





Exam





- Physical Exam


Narrative exam: 





GENERAL:  well-developed -American female lying on bed appeared to be in 

no discomfort. 


HEENT: Normocephalic.  Atraumatic.  No conjunctival congestion or icterus. 

Patient has moist mucous membranes.


NECK: Supple.  Trachea midline.


CHEST/LUNGS: Diminished breath sound auscultated bilaterally, 


HEART/CARDIOVASCULAR: Regular in rate and rhythm.  S1 and S2 positive.


ABDOMEN: Abdomen is soft.  Patient has normal bowel sounds.  


SKIN: There is no rash.  Warm and dry.


NEURO:  No focal motor deficit.  Follows command.


MUSCULOSKELETAL: No joint effusion or tenderness.


EXTRIMITY: No edema, no cyanosis or clubbing.


PSYCH:  Cooperative.











- Constitutional


Vitals: 


                                        











Temp Pulse Resp BP Pulse Ox


 


 98.5 F   79   18   145/74   98 


 


 05/20/21 11:41  05/20/21 11:41  05/20/21 12:54  05/20/21 11:41  05/20/21 12:54














Plan


Activity: advance as tolerated


Weight Bearing Status: Weight Bear as Tolerated


Diet: renal


Special Instructions: other (Continue peritoneal dialysis)


Additional Instructions: continue PD antibiotics (vancomycin and either cefepime

 or ceftazidime) for 10 more days following discharge


Follow up with: 


KIARRA ABAD MD [Primary Care Provider] - 3-5 Days


Prescriptions: 


hydrOXYzine HCL [Atarax] 25 mg PO Q6H PRN #20 tablet


 PRN Reason: Itching


Sucralfate [Carafate] 1 gm PO ACHS #30 tablet


Aspirin EC [Halfprin EC] 81 mg PO QDAY #30 tablet.


polyethylene glycoL 3350 [Miralax 3350] 17 gm PO QDAY #20 powd.pack


Pantoprazole [Protonix TAB] 40 mg PO BIDAC #60 tablet

## 2021-05-20 NOTE — PROGRESS NOTE
Assessment and Plan





Assessment


* End-stage renal disease on PD


* Catheter associated peritonitis, culture negative


   --PD effluent  (May 16); culture negative (May 13)


* SIRS


* Leukocytosis


* Anemia of ESRD


* Secondary hyperparathyroidism


* Hypertension


* Hypokalemia





Recommendations


* Continue CAPD


* Continue antibiotics - (have arranged for IP Vanco/Fortaz after discharge)


* Will give Epogen 20k today


* Continue calcitriol


* Binders with meal


* Continue antihypertensives


* Dose medications for renal function


* Renal diet


* AM labs





Subjective


Date of service: 05/20/21


Principal diagnosis: Abdominal pain


Interval history: 





Patient on bedside commode at time of visit.





Objective





- Exam


Narrative Exam: 





Deferred





- Vital Signs


Vital signs: 


                               Vital Signs - 12hr











  05/19/21 05/20/21 05/20/21





  23:30 03:32 06:52


 


Temperature 98.5 F 98.0 F 


 


Pulse Rate 87 96 H 99 H


 


Respiratory 20 20 





Rate   


 


Blood Pressure 155/67 85/59 


 


Blood Pressure   147/75





[Right]   


 


O2 Sat by Pulse 96 95 





Oximetry   














  05/20/21





  07:24


 


Temperature 98.4 F


 


Pulse Rate 99 H


 


Respiratory 18





Rate 


 


Blood Pressure 139/72


 


Blood Pressure 





[Right] 


 


O2 Sat by Pulse 96





Oximetry 














- Lab





                                 05/20/21 04:31





                                 05/20/21 04:31


                             Most recent lab results











Calcium  7.5 mg/dL (8.4-10.2)  L  05/20/21  04:31    


 


Magnesium  1.40 mg/dL (1.7-2.3)  L  05/11/21  18:35    














Medications & Allergies





- Medications


Allergies/Adverse Reactions: 


                                    Allergies





digoxin Allergy (Verified 05/11/21 18:22)


   Hives


lisinopril Allergy (Verified 05/11/21 18:22)


   Swelling


nitroglycerin Adverse Reaction (Verified 05/11/21 18:22)


   Rash, HEADACHES


shellfish derived Adverse Reaction (Verified 05/11/21 18:22)


   Swelling








Home Medications: 


                                Home Medications











 Medication  Instructions  Recorded  Confirmed  Last Taken  Type


 


NIFEdipine XL [Procardia Xl] 60 mg PO HS 08/21/15 05/12/21 10/24/20 History


 


carvediloL [Coreg] 6.25 mg PO BID 08/21/15 05/12/21 10/24/20 History


 


Insulin Detemir (Nf) [Levemir 15 unit SQ QHS PRN 12/09/16 05/12/21 10/25/19 

History





Flextouch (Nf)]     


 


cloNIDine [Catapres] 0.2 mg PO DAILY 12/09/16 05/12/21 10/24/20 History


 


Gentamicin 0.1% Top Oint(Nf) 1 applic TP TID 10/25/20 05/12/21 Unknown History





[Gentamicin 0.1% Top Oint (Nf)]     


 


Cholecalciferol Vit D3 [Vitamin D3 3,000 unit PO DAILY  tablet 10/26/20 05/12/21

 Unknown Rx





1,000 UNIT TAB]     


 


Cinacalcet [Sensipar] 90 mg PO QDAY 05/12/21 05/12/21 Unknown History


 


Hydralazine HCl 50 mg PO TID 05/12/21 05/12/21 Unknown History











Active Medications: 














Generic Name Dose Route Start Last Admin





  Trade Name Freq  PRN Reason Stop Dose Admin


 


Acetaminophen  650 mg  05/11/21 23:52 





  Acetaminophen 325 Mg Tab  PO  





  Q4H PRN  





  Pain MILD(1-3)/Fever >100.5/HA  


 


Albuterol  2.5 mg  05/14/21 12:24 





  Albuterol 2.5 Mg/3 Ml Nebu  IH  





  Q4HRT PRN  





  Shortness Of Breath  


 


Aspirin  325 mg  05/12/21 10:00  05/19/21 10:30





  Aspirin Ec 325 Mg Tab  PO   325 mg





  QDAY ANTONY   Administration


 


Bisacodyl  10 mg  05/13/21 11:33  05/20/21 06:22





  Bisacodyl 5 Mg Tab  PO   10 mg





  QDAY PRN   Administration





  Constipation  


 


Calcitriol  0.5 mcg  05/12/21 17:00  05/19/21 10:29





  Calcitriol 0.5 Mcg Cap  PO   0.5 mcg





  QDAY ANTONY   Administration


 


Calcium Acetate  1,334 mg  05/14/21 17:00  05/19/21 17:14





  Calcium Acetate 667 Mg Cap  PO   1,334 mg





  TIDWM ANTONY   Administration


 


Carvedilol  6.25 mg  05/12/21 11:00  05/19/21 21:18





  Carvedilol 6.25 Mg Tab  PO   6.25 mg





  BID ANTONY   Administration


 


Cholecalciferol  3,000 unit  05/12/21 11:00  05/19/21 10:29





  Cholecalciferol (Vit D3) 1000 Unit (25 Mcg) Tab  PO   3,000 unit





  DAILY ANTONY   Administration


 


Cinacalcet  90 mg  05/12/21 11:00  05/19/21 10:30





  Cinacalcet 30 Mg Tab  PO   90 mg





  QDAY ANTONY   Administration


 


Clonidine HCl  0.2 mg  05/12/21 11:00  05/19/21 10:29





  Clonidine 0.2 Mg Tab  PO   0.2 mg





  DAILY ANTONY   Administration


 


Dextrose  50 ml  05/11/21 23:52 





  Dextrose 50% In Water (25gm) 50 Ml Syringe  IV  





  Q30MIN PRN  





  Hypoglycemia  





  Protocol  


 


Docusate Sodium  100 mg  05/13/21 12:00  05/19/21 21:18





  Docusate Sodium 100 Mg Cap  PO   100 mg





  BID ANTONY   Administration


 


Hydralazine HCl  50 mg  05/12/21 14:00  05/20/21 08:26





  Hydralazine 25 Mg Tab  PO   Not Given





  Q8HR [facility]  


 


Hydroxyzine HCl  25 mg  05/13/21 14:05  05/19/21 01:33





  Hydroxyzine Hcl 25 Mg Tab  PO   25 mg





  Q6H PRN   Administration





  Itching  


 


Sodium Chloride  100 mls @ 999 mls/hr  05/12/21 11:00 





  Nacl 0.9%  IV  





  KYLIE PRN  





  Hypotension  


 


Cefepime HCl  1 gm in 100 mls @ 200 mls/hr  05/12/21 18:00  05/19/21 17:14





  Cefepime/Ns 1 Gm/100 Ml  IV  05/27/21 18:29  200 mls/hr





  Q24H ANTONY   Administration





  Protocol  


 


Vancomycin HCl  1 gm in 250 mls @ 167.007 mls/hr  05/20/21 10:00 





  Vancomycin/Ns 1 Gm/250 Ml  IV  05/20/21 11:29 





  ONCE ONE  


 


Insulin Human Lispro  0 unit  05/12/21 07:30  05/20/21 08:02





  Insulin Lispro 100 Unit/Ml  SUB-Q   Not Given





  ACHS [facility]  





  Protocol  


 


Magnesium Hydroxide  30 ml  05/11/21 23:52 





  Magnesium Hydroxide (Mom) Oral Liqd Udc  PO  





  Q4H PRN  





  Constipation  


 


Morphine Sulfate  2 mg  05/11/21 23:52  05/14/21 23:49





  Morphine 4 Mg/1 Ml Inj  IV   2 mg





  Q5MIN PRN   Administration





  Chest Pain  


 


Nifedipine  60 mg  05/12/21 22:00  05/19/21 21:18





  Nifedipine Xl 60 Mg Tab  PO   60 mg





  HS ANTONY   Administration


 


Ondansetron HCl  4 mg  05/11/21 23:52  05/13/21 16:54





  Ondansetron 4 Mg/2 Ml Inj  IV   4 mg





  Q8H PRN   Administration





  Nausea And Vomiting  


 


Pantoprazole Sodium  40 mg  05/19/21 16:30  05/19/21 17:14





  Pantoprazole 40 Mg Tab  PO   40 mg





  BIDAC ANTONY   Administration


 


Peritoneal Dialysis Solution  2,000 ml  05/12/21 16:00  05/20/21 08:26





  Dialysate Lo Aneesh 1.5% Soln 2000 Ml  IP   Not Given





  Q6H ANTONY  


 


Polyethylene Glycol  17 gm  05/13/21 12:00  05/19/21 10:30





  Polyethylene Glycol 3350 17 Gm Powder  PO   17 gm





  QDAY ANTONY   Administration


 


Simethicone  80 mg  05/19/21 23:51  05/20/21 06:22





  Simethicone 80 Mg Chew Tab  PO   80 mg





  Q6H PRN   Administration





  Gas pain  


 


Sodium Chloride  10 ml  05/12/21 10:00  05/19/21 21:18





  Sodium Chloride 0.9% 10 Ml Flush Syringe  IV   10 ml





  BID ANTONY   Administration


 


Sodium Chloride  10 ml  05/11/21 23:52  05/18/21 17:18





  Sodium Chloride 0.9% 10 Ml Flush Syringe  IV   10 ml





  PRN PRN   Administration





  LINE FLUSH  


 


Tramadol HCl  50 mg  05/11/21 23:52  05/20/21 01:48





  Tramadol 50 Mg Tab  PO   50 mg





  Q6H PRN   Administration





  Pain, Moderate (4-6)

## 2021-05-20 NOTE — CONSULTATION
DATE OF CONSULTATION: 05/19/2021



REFERRING PHYSICIAN:  Dr. Piedad Carrillo



INDICATIONS:

1.  GERD.

2.  Abdominal pain.



HISTORY OF PRESENT ILLNESS:  The patient is a 70-year-old black female with 

history of diabetes, hypertension, CHF, end-stage renal disease, on dialysis, 

presented with abdominal pain.  The patient reports abdominal and midsternal 

chest pain.  She reports symptoms have been ongoing for 48 hours.  The patient 

presented to the ED, had a negative CT scan, subsequently was admitted and GI 

consulted.  She denies any nausea or vomiting.  She does report some reflux, 

which is now improving.  She denies lower GI symptoms including diarrhea, 

constipation or rectal bleeding.



PAST MEDICAL HISTORY:

1.  Diabetes.

2.  End-stage renal disease, on dialysis.

3.  Heart failure.

4.  Asthma.



MEDICATIONS:  Reviewed and updated in chart.



ALLERGIES:  DIGOXIN, LISINOPRIL, NITROGLYCERIN.



SOCIAL HISTORY:  Denies alcohol, tobacco or drug abuse.



FAMILY HISTORY:  Negative for colon cancer, IBD, or liver disease.



REVIEW OF SYSTEMS:

GENERAL:  Positive weakness.

HEENT:  Negative.

PULMONARY:  Denies shortness of breath and chest pain.

GASTROINTESTINAL:  Reports abdominal pain and reflux, now improved.



PHYSICAL EXAMINATION:

VITAL SIGNS:  Temperature of 98.2, pulse 84, respiratory rate 18, blood pressure

135/75.

GENERAL:  Fairly nourished, in no acute distress.

HEENT:  Pupils are round and reactive.

PULMONARY:  Clear to auscultation bilaterally.

CARDIOVASCULAR:  Regular rhythm, normal S1, S2.

ABDOMEN:  Soft.

SKIN:  No obvious rashes.



LABORATORY DATA:  Pertinent for white count of 10, hemoglobin and hematocrit of 

8.1 and 25.0, platelet count of 268.  Chem-7; sodium of 137, potassium 2.7, 

chloride 95, CO2 31, BUN and creatinine of 29 and 8.7.



ASSESSMENT AND PLAN:  A 70-year-old female with past medical history as noted 

above who was seen by GI for abdominal pain and reflux.  The patient reports 

that she had been constipated and since having bowel movement, her abdominal 

pain has resolved.  She reports her reflux is now stable.  She reports all GI 

symptoms are stable at this time.



PLAN:

1.  MiraLax 17 grams p.o. daily.

2.  PPI daily.

3.  Avoid NSAIDs and aspirin.

4.  No GI complaints at this time and there is no need for endoscopy or other 

intervention.

5.  Given stable, no need for GI input at this time.  We will sign off, call if 

needed.







DD: 05/19/2021 10:39 PM

DT: 05/20/2021 02:41 AM

TID: [id number] RECEIPT: [id number]

JIMMY/ALFREDO

## 2021-05-20 NOTE — PROGRESS NOTE
Assessment and Plan





Chest pain, atypical


   EKG showing normal sinus rhythm, normal ECG. 


   Troponin levels were elevated at 0.1, in the setting of renal disease. 

Unchanged over 3 serial measurements. 


   Echo this presentations demonstrates normal LVEF. There is mild to moderate 

TR with at least moderate pulmonary HTN.


End-stage renal disease, on peritoneal dialysis.


Nausea, vomiting, abdominal pain - chief complaint 


Leukocytosis


   undergoing evaluation and treatment for suspected peritonitis.


Anemia





Conservative cardiac management.





Subjective


Date of service: 05/20/21


Principal diagnosis: Abdominal pain


Interval history: 





Denies chest pain and denies SOB.





Objective


                                   Vital Signs











  Temp Pulse Resp BP BP Pulse Ox


 


 05/20/21 09:54     139/72  


 


 05/20/21 07:24  98.4 F  99 H  18  139/72   96


 


 05/20/21 06:52   99 H    147/75 


 


 05/20/21 03:32  98.0 F  96 H  20  85/59   95


 


 05/19/21 23:30  98.5 F  87  20  155/67   96


 


 05/19/21 19:44  98.2 F  84  16  155/76   98


 


 05/19/21 16:00   85  20  138/59   97


 


 05/19/21 11:07   88    














- Physical Examination


General: No Apparent Distress


HEENT: Positive: PERRL


Neck: Positive: trachea midline


Cardiac: Positive: Reg Rate and Rhythm


Lungs: Positive: Decreased Breath Sounds


Neuro: Positive: Grossly Intact


Extremities: Absent: edema





- Labs and Meds


                                       CBC











  05/20/21 Range/Units





  04:31 


 


WBC  10.6  (4.5-11.0)  K/mm3


 


RBC  2.51 L  (3.65-5.03)  M/mm3


 


Hgb  7.6 L  (10.1-14.3)  gm/dl


 


Hct  23.7 L  (30.3-42.9)  %


 


Plt Count  289  (140-440)  K/mm3








                          Comprehensive Metabolic Panel











  05/19/21 05/20/21 Range/Units





  12:54 04:31 


 


Sodium   139  (137-145)  mmol/L


 


Potassium  3.7  D  3.7  (3.6-5.0)  mmol/L


 


Chloride   97.9 L  ()  mmol/L


 


Carbon Dioxide   31 H  (22-30)  mmol/L


 


BUN   30 H  (7-17)  mg/dL


 


Creatinine   8.9 H  (0.6-1.2)  mg/dL


 


Glucose   97  ()  mg/dL


 


Calcium   7.5 L  (8.4-10.2)  mg/dL

## 2021-07-18 ENCOUNTER — HOSPITAL ENCOUNTER (INPATIENT)
Dept: HOSPITAL 5 - ED | Age: 71
LOS: 5 days | Discharge: HOME | DRG: 871 | End: 2021-07-23
Attending: INTERNAL MEDICINE | Admitting: INTERNAL MEDICINE
Payer: MEDICARE

## 2021-07-18 DIAGNOSIS — N18.6: ICD-10-CM

## 2021-07-18 DIAGNOSIS — I50.9: ICD-10-CM

## 2021-07-18 DIAGNOSIS — Z88.8: ICD-10-CM

## 2021-07-18 DIAGNOSIS — I13.2: ICD-10-CM

## 2021-07-18 DIAGNOSIS — Z79.4: ICD-10-CM

## 2021-07-18 DIAGNOSIS — K65.0: ICD-10-CM

## 2021-07-18 DIAGNOSIS — J45.909: ICD-10-CM

## 2021-07-18 DIAGNOSIS — Z90.49: ICD-10-CM

## 2021-07-18 DIAGNOSIS — E11.22: ICD-10-CM

## 2021-07-18 DIAGNOSIS — E83.51: ICD-10-CM

## 2021-07-18 DIAGNOSIS — Z88.1: ICD-10-CM

## 2021-07-18 DIAGNOSIS — Z91.013: ICD-10-CM

## 2021-07-18 DIAGNOSIS — D63.8: ICD-10-CM

## 2021-07-18 DIAGNOSIS — A41.9: Primary | ICD-10-CM

## 2021-07-18 DIAGNOSIS — M10.9: ICD-10-CM

## 2021-07-18 LAB
ALBUMIN SERPL-MCNC: 3.7 G/DL (ref 3.9–5)
ALT SERPL-CCNC: 16 UNITS/L (ref 7–56)
APTT BLD: 29 SEC. (ref 24.2–36.6)
BACTERIA #/AREA URNS HPF: (no result) /HPF
BAND NEUTROPHILS # (MANUAL): 0 K/MM3
BILIRUB UR QL STRIP: (no result)
BLOOD UR QL VISUAL: (no result)
BUN SERPL-MCNC: 51 MG/DL (ref 7–17)
BUN/CREAT SERPL: 5 %
CALCIUM SERPL-MCNC: 9 MG/DL (ref 8.4–10.2)
HCT VFR BLD CALC: 34.9 % (ref 30.3–42.9)
HEMOLYSIS INDEX: 4
HGB BLD-MCNC: 11.2 GM/DL (ref 10.1–14.3)
INR PPP: 0.96 (ref 0.87–1.13)
MCHC RBC AUTO-ENTMCNC: 32 % (ref 30–34)
MCV RBC AUTO: 97 FL (ref 79–97)
MONOCYTES # FLD: 0 %
MUCOUS THREADS #/AREA URNS HPF: (no result) /HPF
MYELOCYTES # (MANUAL): 0 K/MM3
PH UR STRIP: 8 [PH] (ref 5–7)
PLATELET # BLD: 279 K/MM3 (ref 140–440)
PROMYELOCYTES # (MANUAL): 0 K/MM3
RBC # BLD AUTO: 3.59 M/MM3 (ref 3.65–5.03)
RBC #/AREA URNS HPF: 3 /HPF (ref 0–6)
TOTAL CELLS COUNTED BLD: 100
TOTAL CELLS COUNTED: 100 /MM3
UROBILINOGEN UR-MCNC: < 2 MG/DL (ref ?–2)
WBC #/AREA URNS HPF: 3 /HPF (ref 0–6)

## 2021-07-18 PROCEDURE — 82330 ASSAY OF CALCIUM: CPT

## 2021-07-18 PROCEDURE — 84484 ASSAY OF TROPONIN QUANT: CPT

## 2021-07-18 PROCEDURE — 87076 CULTURE ANAEROBE IDENT EACH: CPT

## 2021-07-18 PROCEDURE — 85610 PROTHROMBIN TIME: CPT

## 2021-07-18 PROCEDURE — 85007 BL SMEAR W/DIFF WBC COUNT: CPT

## 2021-07-18 PROCEDURE — 82962 GLUCOSE BLOOD TEST: CPT

## 2021-07-18 PROCEDURE — 93005 ELECTROCARDIOGRAM TRACING: CPT

## 2021-07-18 PROCEDURE — 06HM33Z INSERTION OF INFUSION DEVICE INTO RIGHT FEMORAL VEIN, PERCUTANEOUS APPROACH: ICD-10-PCS

## 2021-07-18 PROCEDURE — 89051 BODY FLUID CELL COUNT: CPT

## 2021-07-18 PROCEDURE — 36415 COLL VENOUS BLD VENIPUNCTURE: CPT

## 2021-07-18 PROCEDURE — 83970 ASSAY OF PARATHORMONE: CPT

## 2021-07-18 PROCEDURE — 80048 BASIC METABOLIC PNL TOTAL CA: CPT

## 2021-07-18 PROCEDURE — 80202 ASSAY OF VANCOMYCIN: CPT

## 2021-07-18 PROCEDURE — 81001 URINALYSIS AUTO W/SCOPE: CPT

## 2021-07-18 PROCEDURE — B54BZZA ULTRASONOGRAPHY OF RIGHT LOWER EXTREMITY VEINS, GUIDANCE: ICD-10-PCS

## 2021-07-18 PROCEDURE — 87186 SC STD MICRODIL/AGAR DIL: CPT

## 2021-07-18 PROCEDURE — 82140 ASSAY OF AMMONIA: CPT

## 2021-07-18 PROCEDURE — 85730 THROMBOPLASTIN TIME PARTIAL: CPT

## 2021-07-18 PROCEDURE — 74176 CT ABD & PELVIS W/O CONTRAST: CPT

## 2021-07-18 PROCEDURE — 87116 MYCOBACTERIA CULTURE: CPT

## 2021-07-18 PROCEDURE — 87040 BLOOD CULTURE FOR BACTERIA: CPT

## 2021-07-18 PROCEDURE — 85025 COMPLETE CBC W/AUTO DIFF WBC: CPT

## 2021-07-18 PROCEDURE — 74022 RADEX COMPL AQT ABD SERIES: CPT

## 2021-07-18 PROCEDURE — 83690 ASSAY OF LIPASE: CPT

## 2021-07-18 PROCEDURE — 80053 COMPREHEN METABOLIC PANEL: CPT

## 2021-07-18 PROCEDURE — 83735 ASSAY OF MAGNESIUM: CPT

## 2021-07-18 PROCEDURE — 96365 THER/PROPH/DIAG IV INF INIT: CPT

## 2021-07-18 RX ADMIN — HEPARIN SODIUM SCH UNIT: 5000 INJECTION, SOLUTION INTRAVENOUS; SUBCUTANEOUS at 22:40

## 2021-07-18 RX ADMIN — Medication SCH ML: at 22:20

## 2021-07-18 NOTE — HISTORY AND PHYSICAL REPORT
History of Present Illness


Date of examination: 07/18/21


Date of admission: 


07/18/21 18:57





Chief complaint: 


Diffuse abdominal pain for 3 days





History of present illness: 


71-year-old -American female with history of CHF, insulin-dependent 

diabetes, end-stage renal disease on hemodialysis comes in for diffuse abdominal

pain for the last 3 days.  Patient is on hemodialysis for end-stage renal 

disease.  Patient also has some nausea and vomiting after eating a barbecue 

since yesterday.  Vomiting has gotten better.  Now her main problem is abdominal

pain which is diffuse.  Patient is on peritoneal dialysis.  Abdominal pain is 

about 5 on a scale of 1-10.  No acute precipitating or relieving factors.











- Past Medical History


--Hypertension: Yes (FOR 10+ YRS, DR. ABAD- PCP)


--Congestive Heart Failure: Yes (IN 2007)


--Diabetes: Yes


--Renal Disease: Yes (CKD STAGE 5, DR. GUTIERREZ- NEPHROLOGIST)


--Arthritis: Yes (Gout)


--Asthma: Yes (AS A CHILD)


--Additional medical history: Dilated cardiomyopathy / PERITONEAL DYALISIS





- Surgical History


--Appendectomy: Yes (IN 1985)


--Additional Surgical History: tonsil removed





- Social History


Smoking Status: Never Smoker


Substance Use Type: None





- Medications


Home Medications: 


                                Home Medications











 Medication  Instructions  Recorded  Confirmed  Last Taken  Type


 


NIFEdipine XL [Procardia Xl] 60 mg PO HS 08/21/15 05/12/21 10/24/20 History


 


carvediloL [Coreg] 6.25 mg PO BID 08/21/15 05/12/21 10/24/20 History


 


cloNIDine [Catapres] 0.2 mg PO DAILY 12/09/16 05/12/21 10/24/20 History


 


Gentamicin 0.1% Top Oint(Nf) 1 applic TP TID 10/25/20 05/12/21 Unknown History





[Gentamicin 0.1% Top Oint (Nf)]     


 


Cholecalciferol Vit D3 [Vitamin D3 3,000 unit PO DAILY  tablet 10/26/20 05/12/21

 Unknown Rx





1,000 UNIT TAB]     


 


Cinacalcet [Sensipar] 90 mg PO QDAY 05/12/21 05/12/21 Unknown History


 


Hydralazine HCl 50 mg PO TID 05/12/21 05/12/21 Unknown History


 


Aspirin EC [Halfprin EC] 81 mg PO QDAY #30 tablet.dr 05/20/21  Unknown Rx


 


Lispro Insulin [HumaLOG] 0 unit SUB-Q ACHS  units 05/20/21  Unknown Rx


 


Pantoprazole [Protonix TAB] 40 mg PO BIDAC #60 tablet 05/20/21  Unknown Rx


 


Sucralfate [Carafate] 1 gm PO ACHS #30 tablet 05/20/21  Unknown Rx


 


hydrOXYzine HCL [Atarax] 25 mg PO Q6H PRN #20 tablet 05/20/21  Unknown Rx


 


polyethylene glycoL 3350 [Miralax 17 gm PO QDAY #20 powd.pack 05/20/21  Unknown 

Rx





3350]     














 Review of Systems 


ROS: 


Stated complaint: STOMACH/VOMITING BLOOD/HARD TO BREATH


Constitutional nausea vomiting and diarrhea


HEENT no sore throat no post nasal drip no diplopia


Neck no neck stiffness no lymph gland enlargement


Chest and lungs no shortness of breath cough or wheezing


CVS no chest pain no diaphoresis no palpitations


GI diffuse abdominal pain especially periumbilical


Genitourinary system no dysuria no flank pain


Musculoskeletal system no muscle pains no joint pains


CNS no syncope no seizures


Skin no rash no itching


Psychiatric no depression no homicidal or suicidal tendencies


Hematologic no lymphedema or bruising


Endocrine no polydipsia no polyuria no cold intolerance no heat intolerance











Medications and Allergies


                                    Allergies











Allergy/AdvReac Type Severity Reaction Status Date / Time


 


digoxin Allergy  Hives Verified 07/18/21 17:56


 


lisinopril Allergy  Swelling Verified 07/18/21 17:57


 


nitroglycerin AdvReac  Rash, Verified 07/18/21 17:57





   HEADACHES  


 


shellfish derived AdvReac  Swelling Verified 07/18/21 17:57











                                Home Medications











 Medication  Instructions  Recorded  Confirmed  Last Taken  Type


 


NIFEdipine XL [Procardia Xl] 60 mg PO HS 08/21/15 05/12/21 10/24/20 History


 


carvediloL [Coreg] 6.25 mg PO BID 08/21/15 05/12/21 10/24/20 History


 


cloNIDine [Catapres] 0.2 mg PO DAILY 12/09/16 05/12/21 10/24/20 History


 


Gentamicin 0.1% Top Oint(Nf) 1 applic TP TID 10/25/20 05/12/21 Unknown History





[Gentamicin 0.1% Top Oint (Nf)]     


 


Cholecalciferol Vit D3 [Vitamin D3 3,000 unit PO DAILY  tablet 10/26/20 05/12/21

 Unknown Rx





1,000 UNIT TAB]     


 


Cinacalcet [Sensipar] 90 mg PO QDAY 05/12/21 05/12/21 Unknown History


 


Hydralazine HCl 50 mg PO TID 05/12/21 05/12/21 Unknown History


 


Aspirin EC [Halfprin EC] 81 mg PO QDAY #30 tablet. 05/20/21  Unknown Rx


 


Lispro Insulin [HumaLOG] 0 unit SUB-Q ACHS  units 05/20/21  Unknown Rx


 


Pantoprazole [Protonix TAB] 40 mg PO BIDAC #60 tablet 05/20/21  Unknown Rx


 


Sucralfate [Carafate] 1 gm PO ACHS #30 tablet 05/20/21  Unknown Rx


 


hydrOXYzine HCL [Atarax] 25 mg PO Q6H PRN #20 tablet 05/20/21  Unknown Rx


 


polyethylene glycoL 3350 [Miralax 17 gm PO QDAY #20 powd.pack 05/20/21  Unknown 

Rx





3350]     














Exam





- Constitutional


Vitals: 


                                        











Temp Pulse Resp BP Pulse Ox


 


 98.1 F   113 H  23   176/71   98 


 


 07/18/21 17:38  07/18/21 21:30  07/18/21 21:30  07/18/21 21:30  07/18/21 21:30











General appearance: Present: no acute distress, well-nourished





- EENT


Eyes: Present: PERRL


ENT: hearing intact, clear oral mucosa





- Neck


Neck: Present: supple, normal ROM





- Respiratory


Respiratory effort: normal


Respiratory: bilateral: CTA





- Cardiovascular


Heart rate: 78


Rhythm: regular


Heart Sounds: Present: S1 & S2.  Absent: rub, click





- Extremities


Extremities: pulses symmetrical, No edema


Peripheral Pulses: within normal limits





- Abdominal


General gastrointestinal: Present: soft, non-tender, non-distended, normal bowel

sounds


Female genitourinary: Present: normal





- Rectal


Rectal Exam: deferred





- Integumentary


Integumentary: Present: clear, warm, dry





- Musculoskeletal


Musculoskeletal: gait normal, strength equal bilaterally





- Psychiatric


Psychiatric: appropriate mood/affect, intact judgment & insight





- Neurologic


Neurologic: CNII-XII intact, moves all extremities





- Allied Health


Allied health notes reviewed: nursing, case management





HEART Score





- HEART Score


Troponin: 


                                        











Troponin T  0.092 ng/mL (0.00-0.029)  H  07/18/21  14:09    














Results





- Labs


CBC & Chem 7: 


                                 07/19/21 03:57





                                 07/19/21 03:57


Labs: 


                             Laboratory Last Values











WBC  21.0 K/mm3 (4.5-11.0)  H  07/18/21  14:09    


 


RBC  3.59 M/mm3 (3.65-5.03)  L  07/18/21  14:09    


 


Hgb  11.2 gm/dl (10.1-14.3)   07/18/21  14:09    


 


Hct  34.9 % (30.3-42.9)   07/18/21  14:09    


 


MCV  97 fl (79-97)   07/18/21  14:09    


 


MCH  31 pg (28-32)   07/18/21  14:09    


 


MCHC  32 % (30-34)   07/18/21  14:09    


 


RDW  19.5 % (13.2-15.2)  H  07/18/21  14:09    


 


Plt Count  279 K/mm3 (140-440)   07/18/21  14:09    


 


Add Manual Diff  Complete   07/18/21  14:09    


 


Total Counted  100   07/18/21  14:09    


 


Seg Neutrophils %  Np   07/18/21  14:09    


 


Seg Neuts % (Manual)  92.0 % (40.0-70.0)  H  07/18/21  14:09    


 


Lymphocytes % (Manual)  6.0 % (13.4-35.0)  L  07/18/21  14:09    


 


Monocytes % (Manual)  2.0 % (0.0-7.3)   07/18/21  14:09    


 


Nucleated RBC %  Not Reportable   07/18/21  14:09    


 


Seg Neutrophils # Man  19.3 K/mm3 (1.8-7.7)  H  07/18/21  14:09    


 


Band Neutrophils #  0.0 K/mm3  07/18/21  14:09    


 


Lymphocytes # (Manual)  1.3 K/mm3 (1.2-5.4)   07/18/21  14:09    


 


Abs React Lymphs (Man)  0.0 K/mm3  07/18/21  14:09    


 


Monocytes # (Manual)  0.4 K/mm3 (0.0-0.8)   07/18/21  14:09    


 


Eosinophils # (Manual)  0.0 K/mm3 (0.0-0.4)   07/18/21  14:09    


 


Basophils # (Manual)  0.0 K/mm3 (0.0-0.1)   07/18/21  14:09    


 


Metamyelocytes #  0.0 K/mm3  07/18/21  14:09    


 


Myelocytes #  0.0 K/mm3  07/18/21  14:09    


 


Promyelocytes #  0.0 K/mm3  07/18/21  14:09    


 


Blast Cells #  0.0 K/mm3  07/18/21  14:09    


 


WBC Morphology  Not Reportable   07/18/21  14:09    


 


Hypersegmented Neuts  Not Reportable   07/18/21  14:09    


 


Hyposegmented Neuts  Not Reportable   07/18/21  14:09    


 


Hypogranular Neuts  Not Reportable   07/18/21  14:09    


 


Smudge Cells  Not Reportable   07/18/21  14:09    


 


Toxic Granulation  Not Reportable   07/18/21  14:09    


 


Toxic Vacuolation  Not Reportable   07/18/21  14:09    


 


Dohle Bodies  Not Reportable   07/18/21  14:09    


 


Pelger-Huet Anomaly  Not Reportable   07/18/21  14:09    


 


Brigid Rods  Not Reportable   07/18/21  14:09    


 


Platelet Estimate  Consistent w auto   07/18/21  14:09    


 


Clumped Platelets  Not Reportable   07/18/21  14:09    


 


Plt Clumps, EDTA  Not Reportable   07/18/21  14:09    


 


Large Platelets  Not Reportable   07/18/21  14:09    


 


Giant Platelets  Not Reportable   07/18/21  14:09    


 


Platelet Satelliting  Not Reportable   07/18/21  14:09    


 


Plt Morphology Comment  Not Reportable   07/18/21  14:09    


 


RBC Morphology  Normal   07/18/21  14:09    


 


Dimorphic RBCs  Not Reportable   07/18/21  14:09    


 


Polychromasia  Not Reportable   07/18/21  14:09    


 


Hypochromasia  Not Reportable   07/18/21  14:09    


 


Poikilocytosis  Not Reportable   07/18/21  14:09    


 


Anisocytosis  Not Reportable   07/18/21  14:09    


 


Microcytosis  Not Reportable   07/18/21  14:09    


 


Macrocytosis  Not Reportable   07/18/21  14:09    


 


Spherocytes  Not Reportable   07/18/21  14:09    


 


Pappenheimer Bodies  Not Reportable   07/18/21  14:09    


 


Sickle Cells  Not Reportable   07/18/21  14:09    


 


Target Cells  Not Reportable   07/18/21  14:09    


 


Tear Drop Cells  Not Reportable   07/18/21  14:09    


 


Ovalocytes  Not Reportable   07/18/21  14:09    


 


Helmet Cells  Not Reportable   07/18/21  14:09    


 


Connelly-[place] Bodies  Not Reportable   07/18/21  14:09    


 


Cabot Rings  Not Reportable   07/18/21  14:09    


 


Karishma Cells  Not Reportable   07/18/21  14:09    


 


Bite Cells  Not Reportable   07/18/21  14:09    


 


Crenated Cell  Not Reportable   07/18/21  14:09    


 


Elliptocytes  Not Reportable   07/18/21  14:09    


 


Acanthocytes (Spur)  Not Reportable   07/18/21  14:09    


 


Rouleaux  Not Reportable   07/18/21  14:09    


 


Hemoglobin C Crystals  Not Reportable   07/18/21  14:09    


 


Schistocytes  Not Reportable   07/18/21  14:09    


 


Malaria parasites  Not Reportable   07/18/21  14:09    


 


Lakhwinder Bodies  Not Reportable   07/18/21  14:09    


 


Hem Pathologist Commnt  No   07/18/21  14:09    


 


PT  13.3 Sec. (12.2-14.9)   07/18/21  14:09    


 


INR  0.96  (0.87-1.13)   07/18/21  14:09    


 


APTT  29.0 Sec. (24.2-36.6)   07/18/21  14:09    


 


Sodium  141 mmol/L (137-145)   07/18/21  14:09    


 


Potassium  4.7 mmol/L (3.6-5.0)   07/18/21  14:09    


 


Chloride  95.0 mmol/L ()  L  07/18/21  14:09    


 


Carbon Dioxide  32 mmol/L (22-30)  H  07/18/21  14:09    


 


Anion Gap  19 mmol/L  07/18/21  14:09    


 


BUN  51 mg/dL (7-17)  H  07/18/21  14:09    


 


Creatinine  10.3 mg/dL (0.6-1.2)  H  07/18/21  14:09    


 


Estimated GFR  4 ml/min  07/18/21  14:09    


 


BUN/Creatinine Ratio  5 %  07/18/21  14:09    


 


Glucose  106 mg/dL ()  H  07/18/21  14:09    


 


Lactic Acid  2.00 mmol/L (0.7-2.0)   07/18/21  17:11    


 


Calcium  9.0 mg/dL (8.4-10.2)   07/18/21  14:09    


 


Magnesium  1.60 mg/dL (1.7-2.3)  L  07/18/21  14:09    


 


Total Bilirubin  0.50 mg/dL (0.1-1.2)   07/18/21  14:09    


 


AST  22 units/L (5-40)   07/18/21  14:09    


 


ALT  16 units/L (7-56)   07/18/21  14:09    


 


Alkaline Phosphatase  145 units/L ()  H  07/18/21  14:09    


 


Troponin T  0.092 ng/mL (0.00-0.029)  H  07/18/21  14:09    


 


Total Protein  7.2 g/dL (6.3-8.2)   07/18/21  14:09    


 


Albumin  3.7 g/dL (3.9-5)  L  07/18/21  14:09    


 


Albumin/Globulin Ratio  1.1 %  07/18/21  14:09    


 


Lipase  9 units/L (13-60)  L  07/18/21  14:09    


 


Urine Bilirubin  Neg  (Negative)   07/18/21  21:14    


 


Urine RBC (Auto)  3.0 /HPF (0.0-6.0)   07/18/21  21:14    


 


U Epithel Cells (Auto)  41.0 /HPF (0-13.0)  H  07/18/21  21:14    








                                    Short CBC











  07/18/21 07/19/21 Range/Units





  14:09 03:57 


 


WBC  21.0 H  17.7 H  (4.5-11.0)  K/mm3


 


Hgb  11.2  9.1 L  (10.1-14.3)  gm/dl


 


Hct  34.9  29.0 L  (30.3-42.9)  %


 


Plt Count  279  217  (140-440)  K/mm3








                                       BMP











  07/18/21 07/19/21





  14:09 03:57


 


Sodium  141  143


 


Potassium  4.7  5.0


 


Chloride  95.0 L  99.1


 


Carbon Dioxide  32 H  30


 


BUN  51 H  57 H


 


Creatinine  10.3 H  11.2 H


 


Glucose  106 H  70


 


Calcium  9.0  8.2 L








                                 Cardiac Enzymes











  07/18/21 Range/Units





  14:09 


 


Troponin T  0.092 H  (0.00-0.029)  ng/mL








                                 Liver Function











  07/18/21 07/19/21 Range/Units





  14:09 03:57 


 


Total Bilirubin  0.50  0.50  (0.1-1.2)  mg/dL


 


AST  22  13  (5-40)  units/L


 


ALT  16  10  (7-56)  units/L


 


Alkaline Phosphatase  145 H  113  ()  units/L


 


Albumin  3.7 L  2.9 L  (3.9-5)  g/dL








                                      Urine











  07/18/21 Range/Units





  21:14 


 


Urine Color  Yellow  (Yellow)  


 


Urine pH  8.0 H  (5.0-7.0)  


 


Ur Specific Gravity  1.012  (1.003-1.030)  


 


Urine Protein  100 mg/dl  (Negative)  mg/dL


 


Urine Glucose (UA)  Neg  (Negative)  mg/dL














- Imaging and Cardiology


EKG: report reviewed


Chest x-ray: report reviewed


Abdominal x-ray: report reviewed


Imaging and Cardiology: 





Chest/abdominal x-ray


Chest x-ray demonstrates mild bibasilar opacity in a linear distribution


Peritoneal dialysis catheter noted within the pelvis


Several surgical clips project within the left upper abdomen


Nonobstructive bowel gas pattern





Abdominal CAT scan


Significant increase in intra-abdominal ascites since 5/10/2021 which may be at 

least partially related to peritoneal dialysis.


There is no convincing evidence of obstructive bowel disease within the lining 

of the noncontrast technique.  Limits of noncontrast technique.


Intra-abdominal organs appear similar to prior exam.





Assessment and Plan


Advance Directives: Yes (Full code)


VTE prophylaxis?: Chemical


Plan of care discussed with patient/family: Yes





- Patient Problems


(1) Sepsis


Current Visit: Yes   Status: Acute   


Qualifiers: 


   Sepsis type: sepsis due to unspecified organism   Sepsis acute organ 

dysfunction status: without acute organ dysfunction   Qualified Code(s): A41.9 -

Sepsis, unspecified organism   


Plan to address problem: 


Patient has a high white count of 21,000


Diffuse abdominal pain


Patient is a peritoneal dialysis patient


Possibly secondary to peritonitis








(2) Peritonitis


Current Visit: Yes   Status: Acute   


Plan to address problem: 


Patient has peritoneal dialysis catheter


Patient gets regular peritoneal dialysis


Diffuse abdominal pain and tenderness present.


High white count.


Consistent with sepsis and peritonitis.


Patient initiated on IV cefepime and vancomycin


ID consult also requested








(3) End stage renal disease


Current Visit: Yes   Status: Chronic   


Plan to address problem: 


Nephrology consult requested


Patient may need hemodialysis for now


Patient is a peritoneal dialysis candidate








(4) Hypertension


Current Visit: Yes   Status: Chronic   


Qualifiers: 


   Hypertension type: primary hypertension   Qualified Code(s): I10 - Essential 

(primary) hypertension   


Plan to address problem: 


Continue antihypertensives








(5) IDDM (insulin dependent diabetes mellitus)


Current Visit: Yes   Status: Chronic   


Plan to address problem: 


Check hemoglobin A1c and coverage for now








(6) Elevated troponin


Current Visit: Yes   Status: Chronic   


Plan to address problem: 


Secondary to troponin leak








(7) DVT prophylaxis


Current Visit: Yes   Status: Acute   


Plan to address problem: 


On heparin and GI prophylaxis

## 2021-07-18 NOTE — EMERGENCY DEPARTMENT REPORT
ED N/V/D HPI





- General


Chief complaint: GI Bleed


Stated complaint: STOMACH/VOMITING BLOOD/HARD TO BREATH


Time Seen by Provider: 07/18/21 13:59


Source: patient


Mode of arrival: Ambulatory


Limitations: No Limitations





- History of Present Illness


Initial comments: 





71-year-old female, history of hypertension, CHF, diabetes, ESRD on peritoneal 

dialysis, peritonitis, presents to ED with complaint of vomiting and diarrhea.  

Patient states her symptoms began last night.  She believes symptoms are due to 

something that she ate.  Patient states her niece made barbecue, baked beans, 

and coleslaw.  Patient states after she finished eating she began having abd

ominal pain, vomiting and diarrhea.  Patient states she does not normally eat 

beans nor mayonnaise, and believes this is what has caused her symptoms.  

Patient reports abdominal pain located in the periumbilical region.  She states 

the last couple episodes of emesis were streaked with blood and mucus.  Patient 

denies any sick contacts.


MD complaint: nausea, vomiting, diarrhea, abdominal pain


-: Last night


Description of Vomiting: food contents, blood-streaked


Description of Diarrhea: water


Associated Abdominal Pain: Yes


Location: periumbillcal


Radiation: none


Severity: moderate


Quality: cramping


Consistency: constant


Improves with: none


Worsens with: none


Context: possible food poisoning


Associated Symptoms: fever/chills, nausea/vomiting





- Related Data


                                Home Medications











 Medication  Instructions  Recorded  Confirmed  Last Taken


 


NIFEdipine XL [Procardia Xl] 60 mg PO HS 08/21/15 05/12/21 10/24/20


 


carvediloL [Coreg] 6.25 mg PO BID 08/21/15 05/12/21 10/24/20


 


cloNIDine [Catapres] 0.2 mg PO DAILY 12/09/16 05/12/21 10/24/20


 


Gentamicin 0.1% Top Oint(Nf) 1 applic TP TID 10/25/20 05/12/21 Unknown





[Gentamicin 0.1% Top Oint (Nf)]    


 


Cinacalcet [Sensipar] 90 mg PO QDAY 05/12/21 05/12/21 Unknown


 


Hydralazine HCl 50 mg PO TID 05/12/21 05/12/21 Unknown








                                  Previous Rx's











 Medication  Instructions  Recorded  Last Taken  Type


 


Cholecalciferol Vit D3 [Vitamin D3 3,000 unit PO DAILY  tablet 10/26/20 Unknown 

Rx





1,000 UNIT TAB]    


 


Aspirin EC [Halfprin EC] 81 mg PO QDAY #30 tablet. 05/20/21 Unknown Rx


 


Lispro Insulin [HumaLOG] 0 unit SUB-Q ACHS  units 05/20/21 Unknown Rx


 


Pantoprazole [Protonix TAB] 40 mg PO BIDAC #60 tablet 05/20/21 Unknown Rx


 


Sucralfate [Carafate] 1 gm PO ACHS #30 tablet 05/20/21 Unknown Rx


 


hydrOXYzine HCL [Atarax] 25 mg PO Q6H PRN #20 tablet 05/20/21 Unknown Rx


 


polyethylene glycoL 3350 [Miralax 17 gm PO QDAY #20 powd.pack 05/20/21 Unknown 

Rx





3350]    











                                    Allergies











Allergy/AdvReac Type Severity Reaction Status Date / Time


 


digoxin Allergy  Hives Verified 07/18/21 17:56


 


lisinopril Allergy  Swelling Verified 07/18/21 17:57


 


nitroglycerin AdvReac  Rash, Verified 07/18/21 17:57





   HEADACHES  


 


shellfish derived AdvReac  Swelling Verified 07/18/21 17:57














ED Review of Systems


ROS: 


Stated complaint: STOMACH/VOMITING BLOOD/HARD TO BREATH


Other details as noted in HPI





Comment: All other systems reviewed and negative


Constitutional: fever


Gastrointestinal: abdominal pain, nausea, vomiting, diarrhea.  denies: melena, 

hematochezia





ED Past Medical Hx





- Past Medical History


Previous Medical History?: Yes


Hx Hypertension: Yes (FOR 10+ YRS, DR. ABAD- PCP)


Hx Congestive Heart Failure: Yes (IN 2007)


Hx Diabetes: Yes


Hx Liver Disease: No


Hx Renal Disease: Yes (CKD STAGE 5, DR. GUTIERREZ- NEPHROLOGIST)


Hx Sickle Cell Disease: No


Hx Arthritis: Yes (Gout)


Hx Seizures: No


Hx Asthma: Yes (AS A CHILD)


Hx HIV: No


Additional medical history: Dilated cardiomyopathy / PERITONEAL DYALISIS





- Surgical History


Past Surgical History?: Yes


Hx Pacemaker: No


Hx Appendectomy: Yes (IN 1985)


Additional Surgical History: tonsil removed





- Social History


Smoking Status: Never Smoker


Substance Use Type: None





- Medications


Home Medications: 


                                Home Medications











 Medication  Instructions  Recorded  Confirmed  Last Taken  Type


 


NIFEdipine XL [Procardia Xl] 60 mg PO HS 08/21/15 05/12/21 10/24/20 History


 


carvediloL [Coreg] 6.25 mg PO BID 08/21/15 05/12/21 10/24/20 History


 


cloNIDine [Catapres] 0.2 mg PO DAILY 12/09/16 05/12/21 10/24/20 History


 


Gentamicin 0.1% Top Oint(Nf) 1 applic TP TID 10/25/20 05/12/21 Unknown History





[Gentamicin 0.1% Top Oint (Nf)]     


 


Cholecalciferol Vit D3 [Vitamin D3 3,000 unit PO DAILY  tablet 10/26/20 05/12/21

 Unknown Rx





1,000 UNIT TAB]     


 


Cinacalcet [Sensipar] 90 mg PO QDAY 05/12/21 05/12/21 Unknown History


 


Hydralazine HCl 50 mg PO TID 05/12/21 05/12/21 Unknown History


 


Aspirin EC [Halfprin EC] 81 mg PO QDAY #30 tablet.dr 05/20/21  Unknown Rx


 


Lispro Insulin [HumaLOG] 0 unit SUB-Q ACHS  units 05/20/21  Unknown Rx


 


Pantoprazole [Protonix TAB] 40 mg PO BIDAC #60 tablet 05/20/21  Unknown Rx


 


Sucralfate [Carafate] 1 gm PO ACHS #30 tablet 05/20/21  Unknown Rx


 


hydrOXYzine HCL [Atarax] 25 mg PO Q6H PRN #20 tablet 05/20/21  Unknown Rx


 


polyethylene glycoL 3350 [Miralax 17 gm PO QDAY #20 powd.pack 05/20/21  Unknown 

Rx





3350]     














ED Physical Exam





- General


Limitations: No Limitations


General appearance: alert, in no apparent distress





- Head


Head exam: Present: atraumatic, normocephalic





- Eye


Eye exam: Present: normal appearance, EOMI





- ENT


ENT exam: Present: mucous membranes moist





- Neck


Neck exam: Present: normal inspection





- Respiratory


Respiratory exam: Present: normal lung sounds bilaterally.  Absent: respiratory 

distress





- Cardiovascular


Cardiovascular Exam: Present: regular rate, normal rhythm





- GI/Abdominal


GI/Abdominal exam: Present: soft, tenderness (tenderness just to the right of 

the umbilicus).  Absent: distended





- Extremities Exam


Extremities exam: Present: normal inspection





- Neurological Exam


Neurological exam: Present: alert, oriented X3





- Psychiatric


Psychiatric exam: Present: normal affect, normal mood





- Skin


Skin exam: Present: warm, dry, intact, normal color





ED Course


                                   Vital Signs











  07/18/21 07/18/21 07/18/21





  14:02 16:46 17:00


 


Temperature 99.6 F  


 


Pulse Rate 113 H 98 H 92 H


 


Respiratory 20 21 25 H





Rate   


 


Blood Pressure 220/135  


 


O2 Sat by Pulse 100 100 97





Oximetry   














  07/18/21 07/18/21 07/18/21





  17:16 17:36 17:38


 


Temperature   98.1 F


 


Pulse Rate 88 95 H 


 


Respiratory 25 H 16 





Rate   


 


Blood Pressure  201/98 


 


O2 Sat by Pulse 98  





Oximetry   














  07/18/21 07/18/21 07/18/21





  17:46 18:00 18:16


 


Temperature   


 


Pulse Rate 85 81 85


 


Respiratory 21 19 19





Rate   


 


Blood Pressure 201/98 201/98 201/98


 


O2 Sat by Pulse 100 99 98





Oximetry   














  07/18/21 07/18/21 07/18/21





  18:22 18:30 18:46


 


Temperature   


 


Pulse Rate 96 H 101 H 110 H


 


Respiratory  21 27 H





Rate   


 


Blood Pressure 202/83 192/99 192/99


 


O2 Sat by Pulse   98





Oximetry   














  07/18/21 07/18/21 07/18/21





  19:00 19:16 19:30


 


Temperature   


 


Pulse Rate 109 H 105 H 110 H


 


Respiratory 25 H 23 23





Rate   


 


Blood Pressure 186/96 186/96 179/78


 


O2 Sat by Pulse 99 98 95





Oximetry   














  07/18/21 07/18/21 07/18/21





  19:46 20:00 20:16


 


Temperature   


 


Pulse Rate 106 H 102 H 100 H


 


Respiratory 22 23 22





Rate   


 


Blood Pressure 179/78 158/63 158/63


 


O2 Sat by Pulse  99 100





Oximetry   














  07/18/21 07/18/21 07/18/21





  20:30 20:46 21:00


 


Temperature   


 


Pulse Rate 105 H 106 H 110 H


 


Respiratory 24 23 23





Rate   


 


Blood Pressure 153/63 153/63 184/80


 


O2 Sat by Pulse 100 99 98





Oximetry   














  07/18/21 07/18/21





  21:16 21:30


 


Temperature  


 


Pulse Rate 108 H 113 H


 


Respiratory 23 23





Rate  


 


Blood Pressure 153/63 176/71


 


O2 Sat by Pulse 98 98





Oximetry  














- Central Line Placement


  ** Right Femoral


Consent Obtained: written consent


Time Out Performed: Yes


Patient Placed on Monitor/Pulse Ox: Yes


MD Prep: mask, gown, gloves


Central Line Prep: Chlorhexidine scrub


Local Anesthesia Used: Lidocaine 1%


Amount of Anesthesia Used (mls): 3


Ultrasound Used for Placement: Yes


Central Line Lumen Inserted: triple


Reason for Insertion: Emergency Venous Access


Bloods Obtained for Lab: No


Central Line Position: good blood return, all ports aspirated, flus, sutured in 

place with nyl


Dressing Applied: Tegaderm


Patient Tolerated Procedure: well





ED Medical Decision Making





- Lab Data


Result diagrams: 


                                 07/18/21 14:09





                                 07/18/21 14:09





- Radiology Data


Radiology results: report reviewed, image reviewed





- Medical Decision Making





71-year-old female, history of ESRD with peritoneal dialysis presents to ED with

abdominal pain, vomiting, diarrhea since last night.  Patient believes symptoms 

secondary to something that she ate.  Patient initially tachycardic, has WBCs of

21.  Patient has some periumbilical tenderness on exam.  CT abdomen pelvis 

negative for any acute findings.  During previous admission 2 months ago, 

patient was diagnosed with peritonitis.  Peritoneal fluid cell count and culture

ordered.  Patient covered with antibiotics.  Central line placed secondary to 

IVs infiltrating.  Patient is a hard stick.  Patient will be admitted by 

hospitalist, Dr. Harvey, for further management.





- Differential Diagnosis


Gastroenteritis, peritonitis


Critical care attestation.: 


If time is entered above; I have spent that time in minutes in the direct care 

of this critically ill patient, excluding procedure time.








ED Disposition


Clinical Impression: 


 Abdominal pain, Vomiting and diarrhea, Uncontrolled hypertension





Disposition: DC-09 OP ADMIT IP TO THIS HOSP


Is pt being admited?: Yes


Condition: Stable


Time of Disposition: 18:57

## 2021-07-18 NOTE — XRAY REPORT
Abdominal series with frontal chest 4 views



INDICATION: Chest and abdominal pain



IMPRESSION: Chest demonstrates mild bibasilar opacity in a linear distribution. Peritoneal dialysis c
atheter noted within the pelvis. Several surgical clips project within the left upper abdomen. Nonobs
tructive bowel gas pattern.



Signer Name: Franki Bergman MD 

Signed: 7/18/2021 3:47 PM

Workstation Name: [id number]

## 2021-07-18 NOTE — CAT SCAN REPORT
CT abdomen pelvis wo con 



INDICATION / CLINICAL INFORMATION:

abd pain, vomiting, diarrhea.



TECHNIQUE:

Routine CT abdomen pelvis without contrast All CT scans at this location are performed using CT dose 
reduction for ALARA by means of automated exposure control. 



COMPARISON:

5/10/2021.



FINDINGS:





Abdomen and pelvis: The lower lungs are grossly clear aside from mild bibasilar atelectasis. Extensiv
e coronary artery calcification present.



Large volume ascites is developed from the prior exam. It is unclear if this related to peritoneal di
alysis. The liver, gallbladder, spleen, pancreas adrenal glands are unchanged. Both kidneys appear at
rophic. There are multiple complex cysts arising within both kidneys. These cysts appear unchanged. S
evere atherosclerotic calcification throughout the abdominal aorta.



The uterus is atrophic. Urinary bladder is partially fluid distended. Review of bone windows demonstr
ates severe degenerative and erosive changes involving the SI joints likely secondary to renal osteod
ystrophy/hyperparathyroidism



IMPRESSION:

Significant increase in intra-abdominal ascites since 5/10/2021 which may be at least partially relat
ed to peritoneal dialysis. There is no convincing evidence of obstructive bowel disease within the li
mits of the noncontrast technique. Intra-abdominal organs appear similar to the prior exam.



Signer Name: Franki Bergman MD 

Signed: 7/18/2021 6:06 PM

Workstation Name: [id number]

## 2021-07-18 NOTE — EMERGENCY DEPARTMENT REPORT
Blank Doc





- Documentation


Documentation: 





71-year-old female that presents with shortness of breath, abdominal pain, pascale

sea vomiting with blood seen in vomit.





1- This is a initial triage assessment/medical screening only. Full assessment 

and work-up will be completed once the patient is in proper hospital gown, ED 

bed and in a private room setting.  This initial assessment/diagnostic 

orders/clinical plan/ treatment(s) is/are subject to change based on pt's health

status, clinical progression and re-assessment by fellow clinical providers in 

the ED. Further treatment and workup at subsequent clinical providers 

discretion. Patient/guardians urged not to elope from ED as their condition may 

be serious if not clinically assessed and managed. 


2-charge RN notified of patient to be brought back ASAP


3-labs and EKG with imaging studies ordered

## 2021-07-19 LAB
ALBUMIN SERPL-MCNC: 2.9 G/DL (ref 3.9–5)
ALT SERPL-CCNC: 10 UNITS/L (ref 7–56)
BASOPHILS # (AUTO): 0 K/MM3 (ref 0–0.1)
BASOPHILS NFR BLD AUTO: 0.2 % (ref 0–1.8)
BUN SERPL-MCNC: 57 MG/DL (ref 7–17)
BUN/CREAT SERPL: 5 %
CALCIUM SERPL-MCNC: 8.2 MG/DL (ref 8.4–10.2)
EOSINOPHIL # BLD AUTO: 0 K/MM3 (ref 0–0.4)
EOSINOPHIL NFR BLD AUTO: 0.3 % (ref 0–4.3)
HCT VFR BLD CALC: 29 % (ref 30.3–42.9)
HEMOLYSIS INDEX: 0
HGB BLD-MCNC: 9.1 GM/DL (ref 10.1–14.3)
LYMPHOCYTES # BLD AUTO: 0.6 K/MM3 (ref 1.2–5.4)
LYMPHOCYTES NFR BLD AUTO: 3.6 % (ref 13.4–35)
MCHC RBC AUTO-ENTMCNC: 32 % (ref 30–34)
MCV RBC AUTO: 98 FL (ref 79–97)
MONOCYTES # (AUTO): 1.2 K/MM3 (ref 0–0.8)
MONOCYTES % (AUTO): 6.6 % (ref 0–7.3)
PLATELET # BLD: 217 K/MM3 (ref 140–440)
RBC # BLD AUTO: 2.94 M/MM3 (ref 3.65–5.03)

## 2021-07-19 RX ADMIN — HEPARIN SODIUM SCH UNIT: 5000 INJECTION, SOLUTION INTRAVENOUS; SUBCUTANEOUS at 21:56

## 2021-07-19 RX ADMIN — PANTOPRAZOLE SODIUM SCH MG: 40 TABLET, DELAYED RELEASE ORAL at 17:38

## 2021-07-19 RX ADMIN — Medication SCH ML: at 21:57

## 2021-07-19 RX ADMIN — Medication SCH ML: at 09:16

## 2021-07-19 RX ADMIN — SODIUM CHLORIDE, SODIUM LACTATE, CALCIUM CHLORIDE, MAGNESIUM CHLORIDE AND DEXTROSE SCH ML: 2.5; 538; 448; 25.7; 5.08 INJECTION, SOLUTION INTRAPERITONEAL at 17:37

## 2021-07-19 RX ADMIN — ASPIRIN SCH MG: 81 TABLET, COATED ORAL at 17:38

## 2021-07-19 RX ADMIN — SODIUM CHLORIDE, SODIUM LACTATE, CALCIUM CHLORIDE, MAGNESIUM CHLORIDE AND DEXTROSE SCH ML: 2.5; 538; 448; 25.7; 5.08 INJECTION, SOLUTION INTRAPERITONEAL at 12:54

## 2021-07-19 RX ADMIN — FAMOTIDINE SCH MG: 10 INJECTION, SOLUTION INTRAVENOUS at 09:16

## 2021-07-19 RX ADMIN — NIFEDIPINE SCH MG: 60 TABLET, EXTENDED RELEASE ORAL at 21:56

## 2021-07-19 RX ADMIN — CEFEPIME SCH MLS/HR: 1 INJECTION, POWDER, FOR SOLUTION INTRAMUSCULAR; INTRAVENOUS at 17:39

## 2021-07-19 RX ADMIN — FAMOTIDINE SCH MG: 10 INJECTION, SOLUTION INTRAVENOUS at 21:56

## 2021-07-19 RX ADMIN — FAMOTIDINE SCH MG: 10 INJECTION, SOLUTION INTRAVENOUS at 01:00

## 2021-07-19 RX ADMIN — HEPARIN SODIUM SCH UNIT: 5000 INJECTION, SOLUTION INTRAVENOUS; SUBCUTANEOUS at 09:16

## 2021-07-19 RX ADMIN — SUCRALFATE SCH GM: 1 TABLET ORAL at 21:55

## 2021-07-19 RX ADMIN — SUCRALFATE SCH GM: 1 TABLET ORAL at 17:39

## 2021-07-19 NOTE — CONSULTATION
History of Present Illness





- Reason for Consult


Consult date: 07/19/21


end stage renal disease





- History of Present Illness


Is a 71-year-old woman with end-stage renal disease on peritoneal dialysis who 

presented with 3 day history of abdominal pain.  She states the pain started 

after she ate some dairy and coleslaw.  She describes the pain as located in the

lower abdomen, 10/10 in intensity with associated nausea and vomiting and with 

no aggravating or alleviating factors.  She denies cloudy effluent or discharge 

from the PD catheter site.  Her pain has improved since admission.  She denies 

chest pain, shortness of breath and leg swelling.








Medications and Allergies


                                    Allergies











Allergy/AdvReac Type Severity Reaction Status Date / Time


 


digoxin Allergy  Hives Verified 07/18/21 17:56


 


lisinopril Allergy  Swelling Verified 07/18/21 17:57


 


nitroglycerin AdvReac  Rash, Verified 07/18/21 17:57





   HEADACHES  


 


shellfish derived AdvReac  Swelling Verified 07/18/21 17:57











                                Home Medications











 Medication  Instructions  Recorded  Confirmed  Last Taken  Type


 


NIFEdipine XL [Procardia Xl] 60 mg PO HS 08/21/15 05/12/21 10/24/20 History


 


carvediloL [Coreg] 6.25 mg PO BID 08/21/15 05/12/21 10/24/20 History


 


cloNIDine [Catapres] 0.2 mg PO DAILY 12/09/16 05/12/21 10/24/20 History


 


Gentamicin 0.1% Top Oint(Nf) 1 applic TP TID 10/25/20 05/12/21 Unknown History





[Gentamicin 0.1% Top Oint (Nf)]     


 


Cholecalciferol Vit D3 [Vitamin D3 3,000 unit PO DAILY  tablet 10/26/20 05/12/21

 Unknown Rx





1,000 UNIT TAB]     


 


Cinacalcet [Sensipar] 90 mg PO QDAY 05/12/21 05/12/21 Unknown History


 


Hydralazine HCl 50 mg PO TID 05/12/21 05/12/21 Unknown History


 


Aspirin EC [Halfprin EC] 81 mg PO QDAY #30 tablet.dr 05/20/21  Unknown Rx


 


Lispro Insulin [HumaLOG] 0 unit SUB-Q ACHS  units 05/20/21  Unknown Rx


 


Pantoprazole [Protonix TAB] 40 mg PO BIDAC #60 tablet 05/20/21  Unknown Rx


 


Sucralfate [Carafate] 1 gm PO ACHS #30 tablet 05/20/21  Unknown Rx


 


hydrOXYzine HCL [Atarax] 25 mg PO Q6H PRN #20 tablet 05/20/21  Unknown Rx


 


polyethylene glycoL 3350 [Miralax 17 gm PO QDAY #20 powd.pack 05/20/21  Unknown 

Rx





3350]     











Active Meds: 


Active Medications





Acetaminophen (Acetaminophen 325 Mg Tab)  650 mg PO Q4H PRN


   PRN Reason: Pain MILD(1-3)/Fever >100.5/HA


   Last Admin: 07/19/21 00:51 Dose:  650 mg


   Documented by: 


Famotidine (Famotidine 20 Mg/2 Ml Inj)  10 mg IV BID [facility]


   Last Admin: 07/19/21 09:16 Dose:  10 mg


   Documented by: 


Heparin Sodium (Porcine) (Heparin 5,000 Unit/1 Ml Vial)  5,000 unit SUB-Q Q12HR 

[facility]


   Last Admin: 07/19/21 09:16 Dose:  5,000 unit


   Documented by: 


Hydromorphone HCl (Hydromorphone 1 Mg/1 Ml Inj)  0.5 mg IV Q3H PRN


   PRN Reason: Pain , Severe (7-10)


Cefepime HCl (Cefepime/Ns 1 Gm/100 Ml)  1 gm in 100 mls @ 200 mls/hr IV Q24H 

[facility]; Protocol


Ondansetron HCl (Ondansetron 4 Mg/2 Ml Inj)  4 mg IV Q8H PRN


   PRN Reason: Nausea And Vomiting


Oxycodone/Acetaminophen (Oxycodone /Acetaminophen 5-325mg Tab)  1 tab PO Q6H PRN


   PRN Reason: Pain, Moderate (4-6)


   Last Admin: 07/19/21 01:22 Dose:  1 tab


   Documented by: 


Peritoneal Dialysis Solution (Dialysate Pd2 2.5% Soln 2000 Ml)  2,000 ml IP Q6HR

[facility]


   Last Admin: 07/19/21 12:54 Dose:  2,000 ml


   Documented by: 


Sodium Chloride (Sodium Chloride 0.9% 10 Ml Flush Syringe)  10 ml IV BID [facility]


   Last Admin: 07/19/21 09:16 Dose:  10 ml


   Documented by: 


Sodium Chloride (Sodium Chloride 0.9% 10 Ml Flush Syringe)  10 ml IV PRN PRN


   PRN Reason: LINE FLUSH











Review of Systems


Constitutional: fever, no weight loss


Ears, nose, mouth and throat: no nasal congestion


Cardiovascular: no chest pain, no orthopnea


Respiratory: no cough, no hemoptysis


Gastrointestinal: abdominal pain, nausea, vomiting


Musculoskeletal: no muscle weakness, no muscle cramps


Integumentary: no rash, no redness


Neurological: no weakness, no parathesias


Psychiatric: no anxiety, no paranoia


Hematologic/Lymphatic: no easy bruising, no easy bleeding


Allergic/Immunologic: no urticaria, no wheezing





Exam





- Vital Signs


Vital signs: 


                                   Vital Signs











Temp Pulse Resp BP Pulse Ox


 


 99.6 F   113 H  20   220/135   100 


 


 07/18/21 14:02  07/18/21 14:02  07/18/21 14:02  07/18/21 14:02  07/18/21 14:02














- Physical Exam


Narrative exam: 


General: No acute distress


HEENT: Oral mucosa moist


Neck: Supple, no JVD


Chest: Clear to auscultation bilaterally


Heart: RRR, S1 and S2, no pericardial rub


Abdomen: Soft, nontender, no renal bruit.  PD site without erythema or signs of 

inflammation.  No drainage.


Extremity: No peripheral cyanosis, edema


Neurological: Alert, awake, no asterixis


Dermatology: No skin rash


Psych: No agitation


Musculoskeletal: No joint effusion








Results





- Lab Results





                                 07/19/21 03:57





                                 07/19/21 03:57


                             Most recent lab results











Calcium  8.2 mg/dL (8.4-10.2)  L  07/19/21  03:57    


 


Magnesium  1.60 mg/dL (1.7-2.3)  L  07/18/21  14:09    














Assessment and Plan


End-stage renal disease on peritoneal dialysis


Abdominal pain, PD peritonitis?


Anemia


Essential hypertension





Start manual PD


Agree with antibiotics


Follow-up cultures


Epogen weekly once bp is better controlled


No indication for TLC


Continue antihypertensives, monitor trend and assess need for further titration


Renally dose medications


Avoid nephrotoxins


Renal diet

## 2021-07-19 NOTE — PROGRESS NOTE
Assessment and Plan


Assessment and plan: 





(1) Sepsis


Plan to address problem: 


Patient has a high white count of 21,000, now downtrending


Diffuse abdominal pain, diarrhea however no discharge noted.  Now resolved


Patient is a peritoneal dialysis patient


Possibly secondary to peritonitis versus bowel obstruction


Peritoneal fluid culture pending


Trend WBC on CBC


Antibiotic therapy with IV cefepime and vancomycin








(2) Peritonitis


Current Visit: Yes   Status: Acute   


Plan to address problem: 


Patient has peritoneal dialysis catheter


Patient gets regular peritoneal dialysis


Diffuse abdominal pain and tenderness present.


High white count.,  Now downtrending


Consistent with sepsis and peritonitis.  However bowel obstruction on 

differential





Patient initiated on IV cefepime and vancomycin


ID consult also requested





(2) abdominal pain


Current Visit: Yes   Status: Acute   


Plan to address problem: 


Diffuse abdominal pain, diarrhea, and tenderness present on admission


Differential includes peritonitis versus gastroenteritis


CTAP: Significant increase in intra-abdominal ascites since May 2021 exam.  

Obstructive bowel disease unlikely.  Official read per radiology report





(3) End stage renal disease   


Plan to address problem: 


Nephrology consult requested


Patient may need hemodialysis for now


Nephrology consulted: Patient will receive manual PD per nephrology, no indica

tion for TLC at this time


Renally adjust medications


Renal diet








(4) Hypertension


 Plan to address problem: 


Continue antihypertensives


Will add as needed labetalol IV





(5) IDDM (insulin dependent diabetes mellitus)


Plan to address problem: 


Check hemoglobin A1c and coverage for now








(6) Anemia


Plan to address problem: 


Epogen q. weekly once BP controlled.





(6) Elevated troponin


Plan to address problem: 


Secondary to troponin leak








(7) DVT prophylaxis


Plan to address problem: 


On heparin and GI prophylaxis





Dispo: pending clinical improvement, pending completion of PD. Possible d/c 

tomorrow.


Full code


Renal diet





History


Interval history: 





Patient doing well on encounter.  She states that she believes she ate something

that "did not agree with my stomach" and subsequently that is why she felt the 

severe abdominal pain.  He states that she feels much better today.  Yesterday 

she states that she was feeling tenderness and had experienced symptoms of 

diarrhea.  The diarrhea is now resolved according patient.  She denied any type 

of complications from her peritoneal dialysis catheter.  She denied any 

discharge or drainage from her PD catheter insertion site.  Remainder of ROS 

negative except for stated above





Hospitalist Physical





- Physical exam


Narrative exam: 





Physical Exam:


GENERAL APPEARANCE: Well developed, well nourished, alert and cooperative, and 

appears to be in no acute distress.


HEAD: normocephalic.


EYES: PERRL, EOMI. Vision is grossly intact.


EARS: No gross deformities


NOSE: No nasal discharge.


THROAT: Oral cavity and pharynx normal. No inflammation, swelling, exudate, or 

lesions. Teeth and gingiva in good general condition.


NECK: Neck supple, non-tender without lymphadenopathy, masses or thyromegaly.


CARDIAC: Normal S1 and S2. No S3, S4 or murmurs. Rhythm is regular. There is no 

peripheral edema, cyanosis or pallor. Extremities are warm and well perfused. 

Capillary refill is less than 2 seconds. No carotid bruits.


LUNGS: Clear to auscultation and percussion without rales, rhonchi, wheezing or 

diminished breath sounds.


ABDOMEN: PD catheter protruding from central abdomen.  Mild distention, soft 

positive bowel sounds, nontender. No guarding or rebound. No masses.


MUSKULOSKELETAL: Adequately aligned spine. ROM intact spine and extremities. No 

joint erythema or tenderness. Normal muscular development. Normal gait.


BACK: Examination of the spine reveals normal gait and posture


EXTREMITIES: No significant deformity or joint abnormality. No edema. Peripheral

pulses intact. No varicosities.


NEUROLOGICAL: CN II-XII intact. Strength and sensation symmetric and intact 

throughout. Reflexes 2+ throughout. 


PSYCHIATRIC: The mental examination revealed the patient was oriented to person,

place, and time.





- Constitutional


Vitals: 


                                        











Temp Pulse Resp BP Pulse Ox


 


 98.5 F   106 H  19   170/87   91 


 


 07/19/21 11:59  07/19/21 11:59  07/19/21 11:59  07/19/21 11:59  07/19/21 11:59











General appearance: Present: no acute distress, well-nourished





HEART Score





- HEART Score


Troponin: 


                                        











Troponin T  0.092 ng/mL (0.00-0.029)  H  07/18/21  14:09    














Results





- Labs


CBC & Chem 7: 


                                 07/19/21 03:57





                                 07/19/21 03:57


Labs: 


                             Laboratory Last Values











WBC  17.7 K/mm3 (4.5-11.0)  H  07/19/21  03:57    


 


RBC  2.94 M/mm3 (3.65-5.03)  L  07/19/21  03:57    


 


Hgb  9.1 gm/dl (10.1-14.3)  L  07/19/21  03:57    


 


Hct  29.0 % (30.3-42.9)  L  07/19/21  03:57    


 


MCV  98 fl (79-97)  H  07/19/21  03:57    


 


MCH  31 pg (28-32)   07/19/21  03:57    


 


MCHC  32 % (30-34)   07/19/21  03:57    


 


RDW  19.6 % (13.2-15.2)  H  07/19/21  03:57    


 


Plt Count  217 K/mm3 (140-440)   07/19/21  03:57    


 


Lymph % (Auto)  3.6 % (13.4-35.0)  L  07/19/21  03:57    


 


Mono % (Auto)  6.6 % (0.0-7.3)   07/19/21  03:57    


 


Eos % (Auto)  0.3 % (0.0-4.3)   07/19/21  03:57    


 


Baso % (Auto)  0.2 % (0.0-1.8)   07/19/21  03:57    


 


Lymph # (Auto)  0.6 K/mm3 (1.2-5.4)  L  07/19/21  03:57    


 


Mono # (Auto)  1.2 K/mm3 (0.0-0.8)  H  07/19/21  03:57    


 


Eos # (Auto)  0.0 K/mm3 (0.0-0.4)   07/19/21  03:57    


 


Baso # (Auto)  0.0 K/mm3 (0.0-0.1)   07/19/21  03:57    


 


Add Manual Diff  Complete   07/18/21  14:09    


 


Total Counted  100   07/18/21  14:09    


 


Seg Neutrophils %  89.3 % (40.0-70.0)  H  07/19/21  03:57    


 


Seg Neuts % (Manual)  92.0 % (40.0-70.0)  H  07/18/21  14:09    


 


Lymphocytes % (Manual)  6.0 % (13.4-35.0)  L  07/18/21  14:09    


 


Monocytes % (Manual)  2.0 % (0.0-7.3)   07/18/21  14:09    


 


Nucleated RBC %  Not Reportable   07/18/21  14:09    


 


Seg Neutrophils #  15.9 K/mm3 (1.8-7.7)  H  07/19/21  03:57    


 


Seg Neutrophils # Man  19.3 K/mm3 (1.8-7.7)  H  07/18/21  14:09    


 


Band Neutrophils #  0.0 K/mm3  07/18/21  14:09    


 


Lymphocytes # (Manual)  1.3 K/mm3 (1.2-5.4)   07/18/21  14:09    


 


Abs React Lymphs (Man)  0.0 K/mm3  07/18/21  14:09    


 


Monocytes # (Manual)  0.4 K/mm3 (0.0-0.8)   07/18/21  14:09    


 


Eosinophils # (Manual)  0.0 K/mm3 (0.0-0.4)   07/18/21  14:09    


 


Basophils # (Manual)  0.0 K/mm3 (0.0-0.1)   07/18/21  14:09    


 


Metamyelocytes #  0.0 K/mm3  07/18/21  14:09    


 


Myelocytes #  0.0 K/mm3  07/18/21  14:09    


 


Promyelocytes #  0.0 K/mm3  07/18/21  14:09    


 


Blast Cells #  0.0 K/mm3  07/18/21  14:09    


 


WBC Morphology  Not Reportable   07/18/21  14:09    


 


Hypersegmented Neuts  Not Reportable   07/18/21  14:09    


 


Hyposegmented Neuts  Not Reportable   07/18/21  14:09    


 


Hypogranular Neuts  Not Reportable   07/18/21  14:09    


 


Smudge Cells  Not Reportable   07/18/21  14:09    


 


Toxic Granulation  Not Reportable   07/18/21  14:09    


 


Toxic Vacuolation  Not Reportable   07/18/21  14:09    


 


Dohle Bodies  Not Reportable   07/18/21  14:09    


 


Pelger-Huet Anomaly  Not Reportable   07/18/21  14:09    


 


Brigid Rods  Not Reportable   07/18/21  14:09    


 


Platelet Estimate  Consistent w auto   07/18/21  14:09    


 


Clumped Platelets  Not Reportable   07/18/21  14:09    


 


Plt Clumps, EDTA  Not Reportable   07/18/21  14:09    


 


Large Platelets  Not Reportable   07/18/21  14:09    


 


Giant Platelets  Not Reportable   07/18/21  14:09    


 


Platelet Satelliting  Not Reportable   07/18/21  14:09    


 


Plt Morphology Comment  Not Reportable   07/18/21  14:09    


 


RBC Morphology  Normal   07/18/21  14:09    


 


Dimorphic RBCs  Not Reportable   07/18/21  14:09    


 


Polychromasia  Not Reportable   07/18/21  14:09    


 


Hypochromasia  Not Reportable   07/18/21  14:09    


 


Poikilocytosis  Not Reportable   07/18/21  14:09    


 


Anisocytosis  Not Reportable   07/18/21  14:09    


 


Microcytosis  Not Reportable   07/18/21  14:09    


 


Macrocytosis  Not Reportable   07/18/21  14:09    


 


Spherocytes  Not Reportable   07/18/21  14:09    


 


Pappenheimer Bodies  Not Reportable   07/18/21  14:09    


 


Sickle Cells  Not Reportable   07/18/21  14:09    


 


Target Cells  Not Reportable   07/18/21  14:09    


 


Tear Drop Cells  Not Reportable   07/18/21  14:09    


 


Ovalocytes  Not Reportable   07/18/21  14:09    


 


Helmet Cells  Not Reportable   07/18/21  14:09    


 


[place] Bodies  Not Reportable   07/18/21  14:09    


 


Cabot Rings  Not Reportable   07/18/21  14:09    


 


Stuart Cells  Not Reportable   07/18/21  14:09    


 


Bite Cells  Not Reportable   07/18/21  14:09    


 


Crenated Cell  Not Reportable   07/18/21  14:09    


 


Elliptocytes  Not Reportable   07/18/21  14:09    


 


Acanthocytes (Spur)  Not Reportable   07/18/21  14:09    


 


Rouleaux  Not Reportable   07/18/21  14:09    


 


Hemoglobin C Crystals  Not Reportable   07/18/21  14:09    


 


Schistocytes  Not Reportable   07/18/21  14:09    


 


Malaria parasites  Not Reportable   07/18/21  14:09    


 


Lakhwinder Bodies  Not Reportable   07/18/21  14:09    


 


Hem Pathologist Commnt  No   07/18/21  14:09    


 


PT  13.3 Sec. (12.2-14.9)   07/18/21  14:09    


 


INR  0.96  (0.87-1.13)   07/18/21  14:09    


 


APTT  29.0 Sec. (24.2-36.6)   07/18/21  14:09    


 


Sodium  143 mmol/L (137-145)   07/19/21  03:57    


 


Potassium  5.0 mmol/L (3.6-5.0)   07/19/21  03:57    


 


Chloride  99.1 mmol/L ()   07/19/21  03:57    


 


Carbon Dioxide  30 mmol/L (22-30)   07/19/21  03:57    


 


Anion Gap  19 mmol/L  07/19/21  03:57    


 


BUN  57 mg/dL (7-17)  H  07/19/21  03:57    


 


Creatinine  11.2 mg/dL (0.6-1.2)  H  07/19/21  03:57    


 


Estimated GFR  4 ml/min  07/19/21  03:57    


 


BUN/Creatinine Ratio  5 %  07/19/21  03:57    


 


Glucose  70 mg/dL ()   07/19/21  03:57    


 


POC Glucose  95 mg/dL ()   07/19/21  11:24    


 


Lactic Acid  2.00 mmol/L (0.7-2.0)   07/18/21  17:11    


 


Calcium  8.2 mg/dL (8.4-10.2)  L  07/19/21  03:57    


 


Magnesium  1.60 mg/dL (1.7-2.3)  L  07/18/21  14:09    


 


Total Bilirubin  0.50 mg/dL (0.1-1.2)   07/19/21  03:57    


 


AST  13 units/L (5-40)   07/19/21  03:57    


 


ALT  10 units/L (7-56)   07/19/21  03:57    


 


Alkaline Phosphatase  113 units/L ()   07/19/21  03:57    


 


Troponin T  0.092 ng/mL (0.00-0.029)  H  07/18/21  14:09    


 


Total Protein  5.9 g/dL (6.3-8.2)  L  07/19/21  03:57    


 


Albumin  2.9 g/dL (3.9-5)  L  07/19/21  03:57    


 


Albumin/Globulin Ratio  1.0 %  07/19/21  03:57    


 


Lipase  9 units/L (13-60)  L  07/18/21  14:09    


 


Urine Color  Yellow  (Yellow)   07/18/21  21:14    


 


Urine Turbidity  Cloudy  (Clear)   07/18/21  21:14    


 


Urine pH  8.0  (5.0-7.0)  H  07/18/21  21:14    


 


Ur Specific Gravity  1.012  (1.003-1.030)   07/18/21  21:14    


 


Urine Protein  100 mg/dl mg/dL (Negative)   07/18/21  21:14    


 


Urine Glucose (UA)  Neg mg/dL (Negative)   07/18/21  21:14    


 


Urine Ketones  Neg mg/dL (Negative)   07/18/21  21:14    


 


Urine Blood  Neg  (Negative)   07/18/21  21:14    


 


Urine Nitrite  Neg  (Negative)   07/18/21  21:14    


 


Urine Bilirubin  Neg  (Negative)   07/18/21  21:14    


 


Urine Urobilinogen  < 2.0 mg/dL (<2.0)   07/18/21  21:14    


 


Ur Leukocyte Esterase  Neg  (Negative)   07/18/21  21:14    


 


Urine WBC (Auto)  3.0 /HPF (0.0-6.0)   07/18/21  21:14    


 


Urine RBC (Auto)  3.0 /HPF (0.0-6.0)   07/18/21  21:14    


 


U Epithel Cells (Auto)  41.0 /HPF (0-13.0)  H  07/18/21  21:14    


 


Urine Bacteria (Auto)  4+ /HPF (Negative)   07/18/21  21:14    


 


Urine Mucus  Few /HPF  07/18/21  21:14    


 


Fluid Type  Peritoneal   07/18/21  Unknown


 


Fluid Color  Yellow   07/18/21  Unknown


 


Fluid Appearance  Cloudy   07/18/21  Unknown


 


Fluid WBC  3982 /mm3  07/18/21  Unknown


 


Fluid RBC  78 /mm3  07/18/21  Unknown


 


Fluid Seg Neutrophils  99.0 %  07/18/21  Unknown


 


Fluid Lymphocytes  1.0 %  07/18/21  Unknown


 


Fluid Reactive Lymphs  0 %  07/18/21  Unknown


 


Fluid Monocytes  0 %  07/18/21  Unknown


 


Fluid Eosinophils  0 %  07/18/21  Unknown


 


Fluid Basophils  0 %  07/18/21  Unknown











Microbiology: 


Microbiology





07/19/21 08:36   Peripheral/Venous   Blood Culture - Preliminary


                            Culture in Progress


07/19/21 08:36   Peripheral/Venous   Blood Culture - Preliminary


                            Culture in Progress


07/18/21 21:16   Peritioneal Dialysate   Peritoneal Dialysate Culture - Pr

eliminary








Cavazos/IV: 


                                        





Voiding Method                   Bedpan











Active Medications





- Current Medications


Current Medications: 














Generic Name Dose Route Start Last Admin





  Trade Name Freq  PRN Reason Stop Dose Admin


 


Acetaminophen  650 mg  07/18/21 21:58  07/19/21 00:51





  Acetaminophen 325 Mg Tab  PO   650 mg





  Q4H PRN   Administration





  Pain MILD(1-3)/Fever >100.5/HA  


 


Famotidine  10 mg  07/18/21 23:00  07/19/21 09:16





  Famotidine 20 Mg/2 Ml Inj  IV   10 mg





  BID ANTONY   Administration


 


Heparin Sodium (Porcine)  5,000 unit  07/18/21 22:15  07/19/21 09:16





  Heparin 5,000 Unit/1 Ml Vial  SUB-Q   5,000 unit





  Q12HR ANTONY   Administration


 


Hydromorphone HCl  0.5 mg  07/18/21 22:04 





  Hydromorphone 1 Mg/1 Ml Inj  IV  





  Q3H PRN  





  Pain , Severe (7-10)  


 


Cefepime HCl  1 gm in 100 mls @ 200 mls/hr  07/19/21 17:00 





  Cefepime/Ns 1 Gm/100 Ml  IV  





  Q24H [facility]  





  Protocol  


 


Ondansetron HCl  4 mg  07/18/21 21:58 





  Ondansetron 4 Mg/2 Ml Inj  IV  





  Q8H PRN  





  Nausea And Vomiting  


 


Oxycodone/Acetaminophen  1 tab  07/18/21 22:04  07/19/21 01:22





  Oxycodone /Acetaminophen 5-325mg Tab  PO   1 tab





  Q6H PRN   Administration





  Pain, Moderate (4-6)  


 


Peritoneal Dialysis Solution  2,000 ml  07/19/21 12:00  07/19/21 12:54





  Dialysate Pd2 2.5% Soln 2000 Ml  IP   2,000 ml





  Q6HR ANTONY   Administration


 


Sodium Chloride  10 ml  07/18/21 22:00  07/19/21 09:16





  Sodium Chloride 0.9% 10 Ml Flush Syringe  IV   10 ml





  BID ANTONY   Administration


 


Sodium Chloride  10 ml  07/18/21 21:58 





  Sodium Chloride 0.9% 10 Ml Flush Syringe  IV  





  PRN PRN  





  LINE FLUSH

## 2021-07-19 NOTE — CONSULTATION
History of Present Illness





- Reason for Consult


Consult date: 07/19/21


Sepsis, peritonitis


Requesting physician: ADIEL LUCAS





- History of Present Illness





71-year-old female with history of ESRD on peritoneal dialysis, diabetes 

mellitus, CHF, hypertension, admitted on 7/19/2021 secondary to 3-day history of

acute abdominal pain, diffuse, 5 out of 10 in intensity.  Patient also developed

some nausea vomiting and diarrhea, she ate barbecue, ice cream and baked beans 

the day before admission.  Her PD catheter was placed 2 years and a half ago.  

She denies any changes in the color of the peritoneal fluid.  She denies any 

previous PD peritonitis.  On arrival, temperature 99.6, , RR 20, BP 

120/135.  Temperature up to 100.2.  Initial WBC 21,000.  Peritoneal fluid cell 

count WBC 3982, segs 99%.  Blood culture 7/19/2021 no growth today.  PD culture 

7/18/2021 with many polymorphonuclears and no organisms.  CT of the abdomen 

shows increased ascites.








Review of Systems: positive in bold print 


General:  fever, chills,  malaise


Cutaneous: rash, pruritus 


Head:  headaches or injury


Eyes:  changes in vision, eye pain, double vision


Ears: ear pain, ear discharge, ringing or hearing loss 


Nose: nose bleeding, stuffiness


Mouth & throat: bleeding gums,  horseness, no dental problems, or swollen glands


Neck: no pain, node enlargement/lumps, tyroid enlargement or tenderness


Respiratory:  SOB, cough, GILLESPIE, wheezing, sputum, hemoptysis, pleuritic chest 

pain


Cardiovascular:  chest pain, leg edema, cyanosis, GILLESPIE, orthopnea


Musculoskeletal:  edema, deformities, pain


Gastrointestinal:  nausea,  vomiting, diarrhea, constipation, melena, bright red

blood in stools, fecal incontinence, jaundice


Genitourinary/Reproductive: frequent urination, dysuria, hematuria, incontinence


Neurogical:  seizures, headaches,  weakness,  paresthesias,  loss of speech or 

vision;  memory loss, vertigo,   tremors,  numbness


Psychiatric: stable mood;  excessive anxiety, sadness or moodiness


 














 








 








Medications and Allergies


                                    Allergies











Allergy/AdvReac Type Severity Reaction Status Date / Time


 


digoxin Allergy  Hives Verified 07/18/21 17:56


 


lisinopril Allergy  Swelling Verified 07/18/21 17:57


 


nitroglycerin AdvReac  Rash, Verified 07/18/21 17:57





   HEADACHES  


 


shellfish derived AdvReac  Swelling Verified 07/18/21 17:57











                                Home Medications











 Medication  Instructions  Recorded  Confirmed  Last Taken  Type


 


NIFEdipine XL [Procardia Xl] 60 mg PO HS 08/21/15 05/12/21 10/24/20 History


 


carvediloL [Coreg] 6.25 mg PO BID 08/21/15 05/12/21 10/24/20 History


 


cloNIDine [Catapres] 0.2 mg PO DAILY 12/09/16 05/12/21 10/24/20 History


 


Gentamicin 0.1% Top Oint(Nf) 1 applic TP TID 10/25/20 05/12/21 Unknown History





[Gentamicin 0.1% Top Oint (Nf)]     


 


Cholecalciferol Vit D3 [Vitamin D3 3,000 unit PO DAILY  tablet 10/26/20 05/12/21

 Unknown Rx





1,000 UNIT TAB]     


 


Cinacalcet [Sensipar] 90 mg PO QDAY 05/12/21 05/12/21 Unknown History


 


Hydralazine HCl 50 mg PO TID 05/12/21 05/12/21 Unknown History


 


Aspirin EC [Halfprin EC] 81 mg PO QDAY #30 tablet. 05/20/21  Unknown Rx


 


Lispro Insulin [HumaLOG] 0 unit SUB-Q ACHS  units 05/20/21  Unknown Rx


 


Pantoprazole [Protonix TAB] 40 mg PO BIDAC #60 tablet 05/20/21  Unknown Rx


 


Sucralfate [Carafate] 1 gm PO ACHS #30 tablet 05/20/21  Unknown Rx


 


hydrOXYzine HCL [Atarax] 25 mg PO Q6H PRN #20 tablet 05/20/21  Unknown Rx


 


polyethylene glycoL 3350 [Miralax 17 gm PO QDAY #20 powd.pack 05/20/21  Unknown 

Rx





3350]     











Active Meds: 


Active Medications





Acetaminophen (Acetaminophen 325 Mg Tab)  650 mg PO Q4H PRN


   PRN Reason: Pain MILD(1-3)/Fever >100.5/HA


   Last Admin: 07/19/21 00:51 Dose:  650 mg


   Documented by: 


Famotidine (Famotidine 20 Mg/2 Ml Inj)  10 mg IV BID ANTONY


   Last Admin: 07/19/21 09:16 Dose:  10 mg


   Documented by: 


Heparin Sodium (Porcine) (Heparin 5,000 Unit/1 Ml Vial)  5,000 unit SUB-Q Q12HR 

[facility]


   Last Admin: 07/19/21 09:16 Dose:  5,000 unit


   Documented by: 


Hydromorphone HCl (Hydromorphone 1 Mg/1 Ml Inj)  0.5 mg IV Q3H PRN


   PRN Reason: Pain , Severe (7-10)


Cefepime HCl (Cefepime/Ns 1 Gm/100 Ml)  1 gm in 100 mls @ 200 mls/hr IV Q24H 

[facility]; Protocol


Ondansetron HCl (Ondansetron 4 Mg/2 Ml Inj)  4 mg IV Q8H PRN


   PRN Reason: Nausea And Vomiting


Oxycodone/Acetaminophen (Oxycodone /Acetaminophen 5-325mg Tab)  1 tab PO Q6H PRN


   PRN Reason: Pain, Moderate (4-6)


   Last Admin: 07/19/21 01:22 Dose:  1 tab


   Documented by: 


Peritoneal Dialysis Solution (Dialysate Pd2 2.5% Soln 2000 Ml)  2,000 ml IP Q6HR

[facility]


   Last Admin: 07/19/21 12:54 Dose:  2,000 ml


   Documented by: 


Sodium Chloride (Sodium Chloride 0.9% 10 Ml Flush Syringe)  10 ml IV BID [facility]


   Last Admin: 07/19/21 09:16 Dose:  10 ml


   Documented by: 


Sodium Chloride (Sodium Chloride 0.9% 10 Ml Flush Syringe)  10 ml IV PRN PRN


   PRN Reason: LINE FLUSH











Physical Examination





- Physical Exam


Narrative exam: 








General appearance: Alert in NAD pleasant


Eyes: anicteric sclerae, moist conjunctivae; no lid-lag; PERRLA 


HENT: Normocephalic, Atraumatic; normal external ears, nares open, oropharynx 

clear with moist mucous membranes and no oral thrush; normal hard and soft 

palate. 


Neck: supple, tracheal midline, no JVD


Lungs: CTA, with normal respiratory effort and no intercostal retractions 


CV: RRR no murmur


Abdomen: Soft, obese, nontender, PD catheter no erythema no drainage


Extremities: no edema, no cyanosis


Skin: No rash. 


Psych: no agitated


Neuro: alert and oriented x 3. Moving all extermities





 





- Constitutional


Vitals: 


                                   Vital Signs











Temp Pulse Resp BP Pulse Ox


 


 98.5 F   106 H  19   170/87   91 


 


 07/19/21 11:59  07/19/21 11:59  07/19/21 11:59  07/19/21 11:59  07/19/21 11:59








                           Temperature -Last 24 Hours











Temperature                    98.5 F


 


Temperature                    97.9 F


 


Temperature                    98.8 F


 


Temperature                    100.2 F


 


Temperature                    98.1 F


 


Temperature                    99.6 F

















Results





- Labs


CBC & Chem 7: 


                                 07/19/21 03:57





                                 07/19/21 03:57


Labs: 


                              Abnormal lab results











  07/18/21 07/18/21 07/18/21 Range/Units





  14:09 14:09 21:14 


 


WBC  21.0 H    (4.5-11.0)  K/mm3


 


RBC  3.59 L    (3.65-5.03)  M/mm3


 


Hgb     (10.1-14.3)  gm/dl


 


Hct     (30.3-42.9)  %


 


MCV     (79-97)  fl


 


RDW  19.5 H    (13.2-15.2)  %


 


Lymph % (Auto)     (13.4-35.0)  %


 


Lymph # (Auto)     (1.2-5.4)  K/mm3


 


Mono # (Auto)     (0.0-0.8)  K/mm3


 


Seg Neutrophils %     (40.0-70.0)  %


 


Seg Neuts % (Manual)  92.0 H    (40.0-70.0)  %


 


Lymphocytes % (Manual)  6.0 L    (13.4-35.0)  %


 


Seg Neutrophils #     (1.8-7.7)  K/mm3


 


Seg Neutrophils # Man  19.3 H    (1.8-7.7)  K/mm3


 


Chloride   95.0 L   ()  mmol/L


 


Carbon Dioxide   32 H   (22-30)  mmol/L


 


BUN   51 H   (7-17)  mg/dL


 


Creatinine   10.3 H   (0.6-1.2)  mg/dL


 


Glucose   106 H   ()  mg/dL


 


Calcium     (8.4-10.2)  mg/dL


 


Magnesium   1.60 L   (1.7-2.3)  mg/dL


 


Alkaline Phosphatase   145 H   ()  units/L


 


Troponin T   0.092 H   (0.00-0.029)  ng/mL


 


Total Protein     (6.3-8.2)  g/dL


 


Albumin   3.7 L   (3.9-5)  g/dL


 


Lipase   9 L   (13-60)  units/L


 


Urine pH    8.0 H  (5.0-7.0)  


 


U Epithel Cells (Auto)    41.0 H  (0-13.0)  /HPF














  07/19/21 07/19/21 Range/Units





  03:57 03:57 


 


WBC  17.7 H   (4.5-11.0)  K/mm3


 


RBC  2.94 L   (3.65-5.03)  M/mm3


 


Hgb  9.1 L   (10.1-14.3)  gm/dl


 


Hct  29.0 L   (30.3-42.9)  %


 


MCV  98 H   (79-97)  fl


 


RDW  19.6 H   (13.2-15.2)  %


 


Lymph % (Auto)  3.6 L   (13.4-35.0)  %


 


Lymph # (Auto)  0.6 L   (1.2-5.4)  K/mm3


 


Mono # (Auto)  1.2 H   (0.0-0.8)  K/mm3


 


Seg Neutrophils %  89.3 H   (40.0-70.0)  %


 


Seg Neuts % (Manual)    (40.0-70.0)  %


 


Lymphocytes % (Manual)    (13.4-35.0)  %


 


Seg Neutrophils #  15.9 H   (1.8-7.7)  K/mm3


 


Seg Neutrophils # Man    (1.8-7.7)  K/mm3


 


Chloride    ()  mmol/L


 


Carbon Dioxide    (22-30)  mmol/L


 


BUN   57 H  (7-17)  mg/dL


 


Creatinine   11.2 H  (0.6-1.2)  mg/dL


 


Glucose    ()  mg/dL


 


Calcium   8.2 L  (8.4-10.2)  mg/dL


 


Magnesium    (1.7-2.3)  mg/dL


 


Alkaline Phosphatase    ()  units/L


 


Troponin T    (0.00-0.029)  ng/mL


 


Total Protein   5.9 L  (6.3-8.2)  g/dL


 


Albumin   2.9 L  (3.9-5)  g/dL


 


Lipase    (13-60)  units/L


 


Urine pH    (5.0-7.0)  


 


U Epithel Cells (Auto)    (0-13.0)  /HPF














Assessment and Plan


Cultures:


Blood culture 7/19/2021 no growth today.  


PD culture 7/18/2021 with many polymorphonuclears and no organisms.





Assessment: 71-year-old female with history of ESRD on peritoneal dialysis, 

diabetes mellitus, CHF, hypertension, admitted on 7/19/2021 secondary to 3-day 

history of acute abdominal pain, diffuse, 5 out of 10 in intensity associated 

with nausea vomiting and diarrhea after eating barbecue, ice cream and baked b

eans the day before admission:





#Sepsis: Present with fever, tachycardia, leukocytosis.  Secondary to 

peritonitis. 





#PD catheter associated peritonitis versus secondary peritonitis 

(gastroenteritis): No evidence of exit site or tunnel infection. Peritoneal 

effluent is clear. Her PD catheter was placed 2 years and a half ago.  She 

denies any changes in the color of the peritoneal fluid.  She denies any 

previous PD peritonitis. 





#End-stage renal disease: PD


 


Recommendations:


-Follow-up dialysate culture


-Repeat dialysate cell count and diff in 3-5 days  


-Continue cefepime 1 g IV once a day renally adjusted


-Continue pulse vancomycin for now


-f/u leukocytosis


-remove femoral TLC as feasible





Will follow.





Marisa Lynch MD


Infectious Diseases Consultant 


[facility] ([facility])


M [phone]


O [phone]

## 2021-07-20 LAB
ANISOCYTOSIS BLD QL SMEAR: (no result)
BAND NEUTROPHILS # (MANUAL): 0 K/MM3
BUN SERPL-MCNC: 61 MG/DL (ref 7–17)
BUN/CREAT SERPL: 5 %
CALCIUM SERPL-MCNC: 8 MG/DL (ref 8.4–10.2)
HCT VFR BLD CALC: 28 % (ref 30.3–42.9)
HEMOLYSIS INDEX: 18
HGB BLD-MCNC: 8.8 GM/DL (ref 10.1–14.3)
MCHC RBC AUTO-ENTMCNC: 31 % (ref 30–34)
MCV RBC AUTO: 98 FL (ref 79–97)
MYELOCYTES # (MANUAL): 0 K/MM3
PLATELET # BLD: 243 K/MM3 (ref 140–440)
PROMYELOCYTES # (MANUAL): 0 K/MM3
RBC # BLD AUTO: 2.87 M/MM3 (ref 3.65–5.03)
TOTAL CELLS COUNTED BLD: 100

## 2021-07-20 RX ADMIN — SODIUM CHLORIDE, SODIUM LACTATE, CALCIUM CHLORIDE, MAGNESIUM CHLORIDE AND DEXTROSE SCH ML: 2.5; 538; 448; 25.7; 5.08 INJECTION, SOLUTION INTRAPERITONEAL at 12:30

## 2021-07-20 RX ADMIN — PANTOPRAZOLE SODIUM SCH MG: 40 TABLET, DELAYED RELEASE ORAL at 08:30

## 2021-07-20 RX ADMIN — ASPIRIN SCH MG: 81 TABLET, COATED ORAL at 09:17

## 2021-07-20 RX ADMIN — SUCRALFATE SCH GM: 1 TABLET ORAL at 08:30

## 2021-07-20 RX ADMIN — HEPARIN SODIUM SCH UNIT: 5000 INJECTION, SOLUTION INTRAVENOUS; SUBCUTANEOUS at 21:56

## 2021-07-20 RX ADMIN — HEPARIN SODIUM SCH UNIT: 5000 INJECTION, SOLUTION INTRAVENOUS; SUBCUTANEOUS at 09:20

## 2021-07-20 RX ADMIN — Medication SCH UNIT: at 09:18

## 2021-07-20 RX ADMIN — CINACALCET HYDROCHLORIDE SCH MG: 30 TABLET, FILM COATED ORAL at 09:17

## 2021-07-20 RX ADMIN — SUCRALFATE SCH GM: 1 TABLET ORAL at 21:55

## 2021-07-20 RX ADMIN — CEFEPIME SCH MLS/HR: 1 INJECTION, POWDER, FOR SOLUTION INTRAMUSCULAR; INTRAVENOUS at 17:03

## 2021-07-20 RX ADMIN — Medication SCH ML: at 21:56

## 2021-07-20 RX ADMIN — SODIUM CHLORIDE, SODIUM LACTATE, CALCIUM CHLORIDE, MAGNESIUM CHLORIDE AND DEXTROSE SCH ML: 2.5; 538; 448; 25.7; 5.08 INJECTION, SOLUTION INTRAPERITONEAL at 18:57

## 2021-07-20 RX ADMIN — SUCRALFATE SCH GM: 1 TABLET ORAL at 17:04

## 2021-07-20 RX ADMIN — FAMOTIDINE SCH MG: 10 INJECTION, SOLUTION INTRAVENOUS at 09:18

## 2021-07-20 RX ADMIN — PANTOPRAZOLE SODIUM SCH MG: 40 TABLET, DELAYED RELEASE ORAL at 17:04

## 2021-07-20 RX ADMIN — Medication SCH ML: at 09:20

## 2021-07-20 RX ADMIN — NIFEDIPINE SCH MG: 60 TABLET, EXTENDED RELEASE ORAL at 21:56

## 2021-07-20 RX ADMIN — SUCRALFATE SCH GM: 1 TABLET ORAL at 11:31

## 2021-07-20 RX ADMIN — SODIUM CHLORIDE, SODIUM LACTATE, CALCIUM CHLORIDE, MAGNESIUM CHLORIDE AND DEXTROSE SCH ML: 2.5; 538; 448; 25.7; 5.08 INJECTION, SOLUTION INTRAPERITONEAL at 06:06

## 2021-07-20 RX ADMIN — SODIUM CHLORIDE, SODIUM LACTATE, CALCIUM CHLORIDE, MAGNESIUM CHLORIDE AND DEXTROSE SCH ML: 2.5; 538; 448; 25.7; 5.08 INJECTION, SOLUTION INTRAPERITONEAL at 00:24

## 2021-07-20 NOTE — PROGRESS NOTE
Assessment and Plan


71-year-old female with history of ESRD on peritoneal dialysis, diabetes 

mellitus, hypertension, admitted on 7/19/2021 secondary to 3-day history of 

acute abdominal pain with some nausea vomiting and diarrhea. On arrival, 

temperature 99.6, , RR 20, /135.  Temperature up to 100.2.  Initial 

WBC 21,000.  Peritoneal fluid cell count WBC 3982, segs 99%.  Blood culture 

7/19/2021 no growth today.  PD culture 7/18/2021 with many polymorphonuclears 

and no organisms.  CT of the abdomen shows increased ascites.





-- Sepsis


Patient has a high white count of 21,000, now downtrending


Diffuse abdominal pain, diarrhea however no discharge noted. 


Possibly secondary to peritonitis 


CT abdomen pelvis showed no bowel obstruction


Peritoneal fluid culture showed polymorphonuclear cells but no growth


Trend WBC on CBC


Antibiotic therapy with IV cefepime and vancomycin





--Acute peritonitis


Patient has peritoneal dialysis catheter for regular peritoneal dialysis


Diffuse abdominal pain and tenderness present.


High white count.,  Now downtrending


Consistent with sepsis and peritonitis.  


Patient initiated on IV cefepime and vancomycin


ID consult also requested





-- abdominal pain


Diffuse abdominal pain, diarrhea, and tenderness present on admission


Differential includes peritonitis versus gastroenteritis


CTAP: Significant increase in intra-abdominal ascites since May 2021 exam.  

Obstructive bowel disease unlikely.  Official read per radiology report





-- End stage renal disease   


Nephrology consult requested for better dialysis


Patient may need hemodialysis for now, no indication for TLC at this time


Renally adjust medications


Renal diet





-- Hypertension


Continue antihypertensives


as needed labetalol IV





-- IDDM (insulin dependent diabetes mellitus)


Check hemoglobin A1c and SSI coverage for now








--Anemia of chronic disease


Epogen q. weekly once BP controlled.





--Elevated troponin


Secondary to troponin leak








-- DVT prophylaxis


On heparin and GI prophylaxis





Dispo: pending clinical improvement, 


Full code, Renal diet





Daily clinical course:


7/19/21; Patient doing well on encounter.  She states that she believes she ate 

something that "did not agree with my stomach" and subsequently that is why she 

felt the severe abdominal pain.  He states that she feels much better today.  

Yesterday she states that she was feeling tenderness and had experienced 

symptoms of diarrhea.  The diarrhea is now resolved according patient.  She 

denied any type of complications from her peritoneal dialysis catheter.  She 

denied any discharge or drainage from her PD catheter insertion site.  Remainder

of ROS negative except for stated above





7/20/21: PD dialysis fluid Cx neg, blood cx neg. Continue cefepime 1 g IV once a

day renally adjusted, Continue pulse vancomycin for now. f/u leukocytosis, will 

remove femoral TLC. follow clinically





 





Subjective


Date of service: 07/20/21


Principal diagnosis: Abdominal pain


Interval history: 





Patient seen and examined.  Medical records and medication list reviewed.  


No acute event overnight noted by the RN.  


Patient denies any chest pain or difficulty breathing.  Patient is tolerating 

diet.  


She continues to complains of diffuse abdominal pain


Discussed plan of care at bedside with patient.








Objective





- Exam


Narrative Exam: 





GENERAL:  well-developed and well-nourished elderly -American lying on 

bed appeared to be in no discomfort. 


HEENT: Normocephalic.  Atraumatic.  No conjunctival congestion or icterus. 

Patient has moist mucous membranes.


NECK: Supple.  Trachea midline.


CHEST/LUNGS: Clear to auscultated bilaterally, breathing nonlabored. No wheezes 

crackles or rhonchi.


HEART/CARDIOVASCULAR: Regular in rate and rhythm.  S1 and S2 positive.


ABDOMEN: Abdomen is soft, + diffuse tender.  Patient has normal bowel sounds.  


SKIN: There is no rash.  Warm and dry.


NEURO:  No focal motor deficit.  Follows command.


MUSCULOSKELETAL: No joint effusion or tenderness.


EXTRIMITY: No edema, no cyanosis or clubbing.


PSYCH:  Cooperative.








- Constitutional


Vitals: 


                               Vital Signs - 12hr











  07/20/21 07/20/21 07/20/21





  03:07 08:45 09:00


 


Temperature 98.2 F  


 


Pulse Rate 99 H 82 


 


Pulse Rate [   87





From Monitor]   


 


Respiratory 18  17





Rate   


 


Blood Pressure 124/64 130/99 


 


Blood Pressure   





[Right]   


 


O2 Sat by Pulse 97  96





Oximetry   














  07/20/21 07/20/21 07/20/21





  09:20 09:24 11:10


 


Temperature  97.1 F L 97.9 F


 


Pulse Rate 82 112 H 112 H


 


Pulse Rate [   





From Monitor]   


 


Respiratory  19 19





Rate   


 


Blood Pressure 130/99  152/80


 


Blood Pressure  147/77 





[Right]   


 


O2 Sat by Pulse  98 89





Oximetry   














  07/20/21





  13:07


 


Temperature 


 


Pulse Rate 101 H


 


Pulse Rate [ 





From Monitor] 


 


Respiratory 





Rate 


 


Blood Pressure 148/80


 


Blood Pressure 





[Right] 


 


O2 Sat by Pulse 





Oximetry 














- Labs


CBC & Chem 7: 


                                 07/22/21 04:49





                                 07/22/21 04:49


Labs: 


                              Abnormal lab results











  07/19/21 07/20/21 07/20/21 Range/Units





  16:48 04:36 04:36 


 


WBC   17.3 H   (4.5-11.0)  K/mm3


 


RBC   2.87 L   (3.65-5.03)  M/mm3


 


Hgb   8.8 L   (10.1-14.3)  gm/dl


 


Hct   28.0 L   (30.3-42.9)  %


 


MCV   98 H   (79-97)  fl


 


RDW   19.2 H   (13.2-15.2)  %


 


Seg Neuts % (Manual)   93.0 H   (40.0-70.0)  %


 


Lymphocytes % (Manual)   2.0 L   (13.4-35.0)  %


 


Seg Neutrophils # Man   16.1 H   (1.8-7.7)  K/mm3


 


Lymphocytes # (Manual)   0.3 L   (1.2-5.4)  K/mm3


 


Chloride    95.0 L  ()  mmol/L


 


BUN    61 H  (7-17)  mg/dL


 


Creatinine    11.9 H  (0.6-1.2)  mg/dL


 


POC Glucose  122 H    ()  mg/dL


 


Calcium    8.0 L  (8.4-10.2)  mg/dL














  07/20/21 07/20/21 Range/Units





  08:01 11:10 


 


WBC    (4.5-11.0)  K/mm3


 


RBC    (3.65-5.03)  M/mm3


 


Hgb    (10.1-14.3)  gm/dl


 


Hct    (30.3-42.9)  %


 


MCV    (79-97)  fl


 


RDW    (13.2-15.2)  %


 


Seg Neuts % (Manual)    (40.0-70.0)  %


 


Lymphocytes % (Manual)    (13.4-35.0)  %


 


Seg Neutrophils # Man    (1.8-7.7)  K/mm3


 


Lymphocytes # (Manual)    (1.2-5.4)  K/mm3


 


Chloride    ()  mmol/L


 


BUN    (7-17)  mg/dL


 


Creatinine    (0.6-1.2)  mg/dL


 


POC Glucose  160 H  124 H  ()  mg/dL


 


Calcium    (8.4-10.2)  mg/dL














HEART Score





- HEART Score


Troponin: 


                                        











Troponin T  0.092 ng/mL (0.00-0.029)  H  07/18/21  14:09

## 2021-07-20 NOTE — PROGRESS NOTE
Assessment and Plan


End-stage renal disease on peritoneal dialysis


Abdominal pain, PD peritonitis?


Anemia


Essential hypertension





Continue manual PD


Agree with antibiotics


Follow-up cultures


Epogen weekly once bp is better controlled


No indication for TLC


Continue antihypertensives, monitor trend and assess need for further titration


Renally dose medications


Avoid nephrotoxins


Renal diet


Recommend bowel regimen 








Subjective


Date of service: 07/20/21


Principal diagnosis: Abdominal pain


Interval history: 


Experiencing vomiting but notes resolution of abdominal pain.








Objective





- Exam


Narrative Exam: 


General: No acute distress


HEENT: Oral mucosa moist


Neck: Supple, no JVD


Chest: Clear to auscultation bilaterally


Heart: RRR, S1 and S2, no pericardial rub


Abdomen: Soft, nontender, no renal bruit.  PD site without erythema or signs of 

inflammation.  No drainage.


Extremity: No peripheral cyanosis, edema


Neurological: Alert, awake, no asterixis


Dermatology: No skin rash


Psych: No agitation


Musculoskeletal: No joint effusion








- Vital Signs


Vital signs: 


                               Vital Signs - 12hr











  07/20/21 07/20/21 07/20/21





  03:07 08:45 09:00


 


Temperature 98.2 F  


 


Pulse Rate 99 H 82 


 


Pulse Rate [   87





From Monitor]   


 


Respiratory 18  17





Rate   


 


Blood Pressure 124/64 130/99 


 


Blood Pressure   





[Right]   


 


O2 Sat by Pulse 97  96





Oximetry   














  07/20/21 07/20/21





  09:20 09:24


 


Temperature  97.1 F L


 


Pulse Rate 82 112 H


 


Pulse Rate [  





From Monitor]  


 


Respiratory  19





Rate  


 


Blood Pressure 130/99 


 


Blood Pressure  147/77





[Right]  


 


O2 Sat by Pulse  98





Oximetry  














- Lab





                                 07/20/21 04:36





                                 07/20/21 04:36


                             Most recent lab results











Calcium  8.0 mg/dL (8.4-10.2)  L  07/20/21  04:36    


 


Magnesium  1.60 mg/dL (1.7-2.3)  L  07/18/21  14:09    














Medications & Allergies





- Medications


Allergies/Adverse Reactions: 


                                    Allergies





digoxin Allergy (Verified 07/18/21 17:56)


   Hives


lisinopril Allergy (Verified 07/18/21 17:57)


   Swelling


nitroglycerin Adverse Reaction (Verified 07/18/21 17:57)


   Rash, HEADACHES


shellfish derived Adverse Reaction (Verified 07/18/21 17:57)


   Swelling








Home Medications: 


                                Home Medications











 Medication  Instructions  Recorded  Confirmed  Last Taken  Type


 


NIFEdipine XL [Procardia Xl] 60 mg PO HS 08/21/15 05/12/21 10/24/20 History


 


carvediloL [Coreg] 6.25 mg PO BID 08/21/15 05/12/21 10/24/20 History


 


cloNIDine [Catapres] 0.2 mg PO DAILY 12/09/16 05/12/21 10/24/20 History


 


Gentamicin 0.1% Top Oint(Nf) 1 applic TP TID 10/25/20 05/12/21 Unknown History





[Gentamicin 0.1% Top Oint (Nf)]     


 


Cholecalciferol Vit D3 [Vitamin D3 3,000 unit PO DAILY  tablet 10/26/20 05/12/21

 Unknown Rx





1,000 UNIT TAB]     


 


Cinacalcet [Sensipar] 90 mg PO QDAY 05/12/21 05/12/21 Unknown History


 


Hydralazine HCl 50 mg PO TID 05/12/21 05/12/21 Unknown History


 


Aspirin EC [Halfprin EC] 81 mg PO QDAY #30 tablet. 05/20/21  Unknown Rx


 


Lispro Insulin [HumaLOG] 0 unit SUB-Q ACHS  units 05/20/21  Unknown Rx


 


Pantoprazole [Protonix TAB] 40 mg PO BIDAC #60 tablet 05/20/21  Unknown Rx


 


Sucralfate [Carafate] 1 gm PO ACHS #30 tablet 05/20/21  Unknown Rx


 


hydrOXYzine HCL [Atarax] 25 mg PO Q6H PRN #20 tablet 05/20/21  Unknown Rx


 


polyethylene glycoL 3350 [Miralax 17 gm PO QDAY #20 powd.pack 05/20/21  Unknown 

Rx





3350]     











Active Medications: 














Generic Name Dose Route Start Last Admin





  Trade Name Freq  PRN Reason Stop Dose Admin


 


Acetaminophen  650 mg  07/18/21 21:58  07/19/21 00:51





  Acetaminophen 325 Mg Tab  PO   650 mg





  Q4H PRN   Administration





  Pain MILD(1-3)/Fever >100.5/HA  


 


Aspirin  81 mg  07/19/21 17:00  07/20/21 09:17





  Aspirin Ec 81 Mg Tab  PO   81 mg





  QDAY ANTONY   Administration


 


Carvedilol  6.25 mg  07/19/21 22:00  07/20/21 09:20





  Carvedilol 6.25 Mg Tab  PO   6.25 mg





  BID ANTONY   Administration


 


Cholecalciferol  3,000 unit  07/20/21 10:00  07/20/21 09:18





  Cholecalciferol (Vit D3) 1000 Unit (25 Mcg) Tab  PO   3,000 unit





  DAILY ANTONY   Administration


 


Cinacalcet  90 mg  07/20/21 10:00  07/20/21 09:17





  Cinacalcet 30 Mg Tab  PO   90 mg





  QDAY ANTONY   Administration


 


Clonidine HCl  0.2 mg  07/20/21 10:00  07/20/21 09:20





  Clonidine 0.2 Mg Tab  PO   0.2 mg





  DAILY ANTONY   Administration


 


Famotidine  10 mg  07/18/21 23:00  07/20/21 09:18





  Famotidine 20 Mg/2 Ml Inj  IV   10 mg





  BID ANTONY   Administration


 


Heparin Sodium (Porcine)  5,000 unit  07/18/21 22:15  07/20/21 09:20





  Heparin 5,000 Unit/1 Ml Vial  SUB-Q   5,000 unit





  Q12HR ANTONY   Administration


 


Hydralazine HCl  50 mg  07/19/21 20:00  07/20/21 08:45





  Hydralazine 25 Mg Tab  PO   50 mg





  TID ANTONY   Administration


 


Hydromorphone HCl  0.5 mg  07/18/21 22:04 





  Hydromorphone 1 Mg/1 Ml Inj  IV  





  Q3H PRN  





  Pain , Severe (7-10)  


 


Hydroxyzine HCl  25 mg  07/19/21 16:29 





  Hydroxyzine Hcl 25 Mg Tab  PO  





  Q6H PRN  





  Itching  


 


Cefepime HCl  1 gm in 100 mls @ 200 mls/hr  07/19/21 17:00  07/19/21 17:39





  Cefepime/Ns 1 Gm/100 Ml  IV   200 mls/hr





  Q24H ANTONY   Administration





  Protocol  


 


Nifedipine  60 mg  07/19/21 22:00  07/19/21 21:56





  Nifedipine Xl 60 Mg Tab  PO   60 mg





  [facility] ANTONY   Administration


 


Ondansetron HCl  4 mg  07/18/21 21:58 





  Ondansetron 4 Mg/2 Ml Inj  IV  





  Q8H PRN  





  Nausea And Vomiting  


 


Oxycodone/Acetaminophen  1 tab  07/18/21 22:04  07/19/21 01:22





  Oxycodone /Acetaminophen 5-325mg Tab  PO   1 tab





  Q6H PRN   Administration





  Pain, Moderate (4-6)  


 


Pantoprazole Sodium  40 mg  07/19/21 16:30  07/20/21 08:30





  Pantoprazole 40 Mg Tab  PO   40 mg





  BIDAC ANTONY   Administration


 


Peritoneal Dialysis Solution  2,000 ml  07/19/21 12:00  07/20/21 06:06





  Dialysate Pd2 2.5% Soln 2000 Ml  IP   2,000 ml





  Q6HR ANTONY   Administration


 


Sodium Chloride  10 ml  07/18/21 22:00  07/20/21 09:20





  Sodium Chloride 0.9% 10 Ml Flush Syringe  IV   10 ml





  BID ANTONY   Administration


 


Sodium Chloride  10 ml  07/18/21 21:58 





  Sodium Chloride 0.9% 10 Ml Flush Syringe  IV  





  PRN PRN  





  LINE FLUSH  


 


Sucralfate  1 gm  07/19/21 16:30  07/20/21 08:30





  Sucralfate 1 Gm Tab  PO   1 gm





  [facility] ANTONY   Administration

## 2021-07-20 NOTE — PROGRESS NOTE
Assessment and Plan


Cultures:


Blood culture 7/19/2021 no growth today.  


PD culture 7/18/2021 with many polymorphonuclears and no organisms.





Assessment: 71-year-old female with history of ESRD on peritoneal dialysis, 

diabetes mellitus, CHF, hypertension, admitted on 7/19/2021 secondary to 3-day 

history of acute abdominal pain, diffuse, 5 out of 10 in intensity associated 

with nausea vomiting and diarrhea after eating barbecue, ice cream and baked 

beans the day before admission:





#Sepsis: No fever, leukocytosis not better.  Secondary to peritonitis. 





#PD catheter associated peritonitis versus secondary peritonitis (gastro

enteritis): Abdominal pain not better.  No evidence of exit site or tunnel infe

ction. Peritoneal effluent is clear. Her PD catheter was placed 2 years and a 

half ago.  She denies any changes in the color of the peritoneal fluid.  She 

denies any previous PD peritonitis. 





#End-stage renal disease: PD


 


Recommendations:


-If abdominal pain continues please consult surgery


-Follow-up dialysate culture


-Repeat dialysate cell count and diff in 3-5 days  


-Continue cefepime 1 g IV once a day renally adjusted


-Continue pulse vancomycin for now


-f/u leukocytosis


-remove femoral TLC  





Will follow.





Marisa Lynch MD


Infectious Diseases Consultant 


[facility] ([facility])


M [phone]


O [phone]


 





Subjective


Date of service: 07/20/21


Principal diagnosis: Abdominal pain


Interval history: 


Patient does not feel better, she is complaining of abdominal pain 8 out of 10, 

associated with nausea and vomiting.  No fever.  No diarrhea.








Objective





- Exam


Narrative Exam: 


General appearance: Alert in NAD pleasant


Eyes: anicteric sclerae, moist conjunctivae; no lid-lag; PERRLA 


HENT: Normocephalic, Atraumatic; normal external ears, nares open, oropharynx 

clear with moist mucous membranes and no oral thrush; normal hard and soft 

palate. 


Neck: supple, tracheal midline, no JVD


Lungs: CTA, with normal respiratory effort and no intercostal retractions 


CV: RRR no murmur


Abdomen: Soft, tenderness palpation diffusely, PD catheter clean no drainage


Extremities: no edema, no cyanosis


Skin: No rash. 


Psych: no agitated


Neuro: alert and oriented x 3. Moving all extermities





 





- Constitutional


Vitals: 


                                   Vital Signs











Temp Pulse Resp BP Pulse Ox


 


 97.9 F   101 H  19   148/80   89 


 


 07/20/21 11:10  07/20/21 13:07  07/20/21 11:10  07/20/21 13:07  07/20/21 11:10








                           Temperature -Last 24 Hours











Temperature                    97.9 F


 


Temperature                    97.1 F


 


Temperature                    98.2 F


 


Temperature                    98.5 F


 


Temperature                    98.3 F


 


Temperature                    98.8 F

















- Labs


CBC & Chem 7: 


                                 07/20/21 04:36





                                 07/20/21 04:36


Labs: 


                              Abnormal lab results











  07/19/21 07/20/21 07/20/21 Range/Units





  16:48 04:36 04:36 


 


WBC   17.3 H   (4.5-11.0)  K/mm3


 


RBC   2.87 L   (3.65-5.03)  M/mm3


 


Hgb   8.8 L   (10.1-14.3)  gm/dl


 


Hct   28.0 L   (30.3-42.9)  %


 


MCV   98 H   (79-97)  fl


 


RDW   19.2 H   (13.2-15.2)  %


 


Seg Neuts % (Manual)   93.0 H   (40.0-70.0)  %


 


Lymphocytes % (Manual)   2.0 L   (13.4-35.0)  %


 


Seg Neutrophils # Man   16.1 H   (1.8-7.7)  K/mm3


 


Lymphocytes # (Manual)   0.3 L   (1.2-5.4)  K/mm3


 


Chloride    95.0 L  ()  mmol/L


 


BUN    61 H  (7-17)  mg/dL


 


Creatinine    11.9 H  (0.6-1.2)  mg/dL


 


POC Glucose  122 H    ()  mg/dL


 


Calcium    8.0 L  (8.4-10.2)  mg/dL














  07/20/21 07/20/21 Range/Units





  08:01 11:10 


 


WBC    (4.5-11.0)  K/mm3


 


RBC    (3.65-5.03)  M/mm3


 


Hgb    (10.1-14.3)  gm/dl


 


Hct    (30.3-42.9)  %


 


MCV    (79-97)  fl


 


RDW    (13.2-15.2)  %


 


Seg Neuts % (Manual)    (40.0-70.0)  %


 


Lymphocytes % (Manual)    (13.4-35.0)  %


 


Seg Neutrophils # Man    (1.8-7.7)  K/mm3


 


Lymphocytes # (Manual)    (1.2-5.4)  K/mm3


 


Chloride    ()  mmol/L


 


BUN    (7-17)  mg/dL


 


Creatinine    (0.6-1.2)  mg/dL


 


POC Glucose  160 H  124 H  ()  mg/dL


 


Calcium    (8.4-10.2)  mg/dL

## 2021-07-20 NOTE — ELECTROCARDIOGRAPH REPORT
[facility]

                                       

Test Date:    2021               Test Time:    10:09:27

Pat Name:     ANDREW CARSON         Department:   

Patient ID:   [id number]          Room:         A462 1

Gender:       F                        Technician:   RAMÓN

:          1950               Requested By: LIZZIE HOLBROOK

Order Number: [id number]              Reading MD:   Sheri Grande

                                 Measurements

Intervals                              Axis          

Rate:         100                      P:            63

ND:           124                      QRS:          15

QRSD:         78                       T:            15

QT:           375                                    

QTc:          484                                    

                           Interpretive Statements

Sinus tachycardia

Compared to ECG 2021 11:53:39

No significant change

Electronically Signed On 2021 10:16:43 EDT by Sheri Grande

## 2021-07-21 RX ADMIN — PANTOPRAZOLE SODIUM SCH MG: 40 TABLET, DELAYED RELEASE ORAL at 10:16

## 2021-07-21 RX ADMIN — Medication SCH UNIT: at 10:10

## 2021-07-21 RX ADMIN — SODIUM CHLORIDE, SODIUM LACTATE, CALCIUM CHLORIDE, MAGNESIUM CHLORIDE AND DEXTROSE SCH: 2.5; 538; 448; 25.7; 5.08 INJECTION, SOLUTION INTRAPERITONEAL at 07:31

## 2021-07-21 RX ADMIN — SUCRALFATE SCH GM: 1 TABLET ORAL at 10:12

## 2021-07-21 RX ADMIN — SUCRALFATE SCH GM: 1 TABLET ORAL at 17:35

## 2021-07-21 RX ADMIN — HEPARIN SODIUM SCH UNIT: 5000 INJECTION, SOLUTION INTRAVENOUS; SUBCUTANEOUS at 10:09

## 2021-07-21 RX ADMIN — Medication SCH ML: at 10:17

## 2021-07-21 RX ADMIN — HEPARIN SODIUM SCH UNIT: 5000 INJECTION, SOLUTION INTRAVENOUS; SUBCUTANEOUS at 21:22

## 2021-07-21 RX ADMIN — SUCRALFATE SCH GM: 1 TABLET ORAL at 21:20

## 2021-07-21 RX ADMIN — SUCRALFATE SCH GM: 1 TABLET ORAL at 13:08

## 2021-07-21 RX ADMIN — SODIUM CHLORIDE, SODIUM LACTATE, CALCIUM CHLORIDE, MAGNESIUM CHLORIDE AND DEXTROSE SCH ML: 2.5; 538; 448; 25.7; 5.08 INJECTION, SOLUTION INTRAPERITONEAL at 18:34

## 2021-07-21 RX ADMIN — ASPIRIN SCH MG: 81 TABLET, COATED ORAL at 10:10

## 2021-07-21 RX ADMIN — NIFEDIPINE SCH MG: 60 TABLET, EXTENDED RELEASE ORAL at 21:20

## 2021-07-21 RX ADMIN — SODIUM CHLORIDE, SODIUM LACTATE, CALCIUM CHLORIDE, MAGNESIUM CHLORIDE AND DEXTROSE SCH: 2.5; 538; 448; 25.7; 5.08 INJECTION, SOLUTION INTRAPERITONEAL at 00:00

## 2021-07-21 RX ADMIN — CEFEPIME SCH MLS/HR: 1 INJECTION, POWDER, FOR SOLUTION INTRAMUSCULAR; INTRAVENOUS at 17:38

## 2021-07-21 RX ADMIN — Medication SCH ML: at 21:20

## 2021-07-21 RX ADMIN — PANTOPRAZOLE SODIUM SCH MG: 40 TABLET, DELAYED RELEASE ORAL at 17:35

## 2021-07-21 RX ADMIN — CINACALCET HYDROCHLORIDE SCH MG: 30 TABLET, FILM COATED ORAL at 10:10

## 2021-07-21 RX ADMIN — SODIUM CHLORIDE, SODIUM LACTATE, CALCIUM CHLORIDE, MAGNESIUM CHLORIDE AND DEXTROSE SCH ML: 2.5; 538; 448; 25.7; 5.08 INJECTION, SOLUTION INTRAPERITONEAL at 13:08

## 2021-07-21 NOTE — CONSULTATION
History of Present Illness


Consult date: 07/21/21


Reason for consult: abdominal pain





- History of present illness


History of present illness: 





71-year-old female who was admitted to the hospital with abdominal pain nausea 

and vomiting.  Patient says that the nausea and vomiting, diarrhea and abdominal

pain started 3 days ago after eating some barbecue.  She denies any nausea and 

vomiting and diarrhea over the last 24 hours however she says she continues to 

have abdominal pain that she rates 9 out of 10.  Patient had a CT scan that 

showed no acute pathology except for abdominal ascites, and an intact peritoneal

dialysis catheter.  Patient says that she had a peritoneal dialysis catheter 

placed 2 years ago and has had no issues.  She says that her dialysis fluid has 

remained clear.





Past History


Past Medical History: diabetes, hypertension, renal failure, other 

(Cardiomyopathy)


Past Surgical History: Other (PD catheter placement, appendectomy)





Medications and Allergies


                                    Allergies











Allergy/AdvReac Type Severity Reaction Status Date / Time


 


digoxin Allergy  Hives Verified 07/18/21 17:56


 


lisinopril Allergy  Swelling Verified 07/18/21 17:57


 


nitroglycerin AdvReac  Rash, Verified 07/18/21 17:57





   HEADACHES  


 


shellfish derived AdvReac  Swelling Verified 07/18/21 17:57











                                Home Medications











 Medication  Instructions  Recorded  Confirmed  Last Taken  Type


 


NIFEdipine XL [Procardia Xl] 60 mg PO HS 08/21/15 05/12/21 10/24/20 History


 


carvediloL [Coreg] 6.25 mg PO BID 08/21/15 05/12/21 10/24/20 History


 


cloNIDine [Catapres] 0.2 mg PO DAILY 12/09/16 05/12/21 10/24/20 History


 


Gentamicin 0.1% Top Oint(Nf) 1 applic TP TID 10/25/20 05/12/21 Unknown History





[Gentamicin 0.1% Top Oint (Nf)]     


 


Cholecalciferol Vit D3 [Vitamin D3 3,000 unit PO DAILY  tablet 10/26/20 05/12/21

 Unknown Rx





1,000 UNIT TAB]     


 


Cinacalcet [Sensipar] 90 mg PO QDAY 05/12/21 05/12/21 Unknown History


 


Hydralazine HCl 50 mg PO TID 05/12/21 05/12/21 Unknown History


 


Aspirin EC [Halfprin EC] 81 mg PO QDAY #30 tablet. 05/20/21  Unknown Rx


 


Lispro Insulin [HumaLOG] 0 unit SUB-Q ACHS  units 05/20/21  Unknown Rx


 


Pantoprazole [Protonix TAB] 40 mg PO BIDAC #60 tablet 05/20/21  Unknown Rx


 


Sucralfate [Carafate] 1 gm PO ACHS #30 tablet 05/20/21  Unknown Rx


 


hydrOXYzine HCL [Atarax] 25 mg PO Q6H PRN #20 tablet 05/20/21  Unknown Rx


 


polyethylene glycoL 3350 [Miralax 17 gm PO QDAY #20 powd.pack 05/20/21  Unknown 

Rx





3350]     











Active Meds: 


Active Medications





Acetaminophen (Acetaminophen 325 Mg Tab)  650 mg PO Q4H PRN


   PRN Reason: Pain MILD(1-3)/Fever >100.5/HA


   Last Admin: 07/19/21 00:51 Dose:  650 mg


   Documented by: 


Aspirin (Aspirin Ec 81 Mg Tab)  81 mg PO QDAY Replaced by [facility]


   Last Admin: 07/21/21 10:10 Dose:  81 mg


   Documented by: 


Carvedilol (Carvedilol 6.25 Mg Tab)  6.25 mg PO BID Replaced by [facility]


   Last Admin: 07/21/21 10:12 Dose:  6.25 mg


   Documented by: 


Cholecalciferol (Cholecalciferol (Vit D3) 1000 Unit (25 Mcg) Tab)  3,000 unit PO

DAILY Replaced by [facility]


   Last Admin: 07/21/21 10:10 Dose:  3,000 unit


   Documented by: 


Cinacalcet (Cinacalcet 30 Mg Tab)  90 mg PO QDAY Replaced by [facility]


   Last Admin: 07/21/21 10:10 Dose:  90 mg


   Documented by: 


Clonidine HCl (Clonidine 0.2 Mg Tab)  0.2 mg PO DAILY Replaced by [facility]


   Last Admin: 07/21/21 10:10 Dose:  0.2 mg


   Documented by: 


Heparin Sodium (Porcine) (Heparin 5,000 Unit/1 Ml Vial)  5,000 unit SUB-Q Q12HR 

Replaced by [facility]


   Last Admin: 07/21/21 10:09 Dose:  5,000 unit


   Documented by: 


Hydralazine HCl (Hydralazine 25 Mg Tab)  50 mg PO TID Replaced by [facility]


   Last Admin: 07/21/21 13:08 Dose:  50 mg


   Documented by: 


Hydromorphone HCl (Hydromorphone 1 Mg/1 Ml Inj)  0.5 mg IV Q3H PRN


   PRN Reason: Pain , Severe (7-10)


Hydroxyzine HCl (Hydroxyzine Hcl 25 Mg Tab)  25 mg PO Q6H PRN


   PRN Reason: Itching


   Last Admin: 07/21/21 06:10 Dose:  25 mg


   Documented by: 


Cefepime HCl (Cefepime/Ns 1 Gm/100 Ml)  1 gm in 100 mls @ 200 mls/hr IV Q24H 

Replaced by [facility]; Protocol


   Last Admin: 07/21/21 17:38 Dose:  200 mls/hr


   Documented by: 


Nifedipine (Nifedipine Xl 60 Mg Tab)  60 mg PO HS Replaced by [facility]


   Last Admin: 07/20/21 21:56 Dose:  60 mg


   Documented by: 


Ondansetron HCl (Ondansetron 4 Mg/2 Ml Inj)  4 mg IV Q8H PRN


   PRN Reason: Nausea And Vomiting


Oxycodone/Acetaminophen (Oxycodone /Acetaminophen 5-325mg Tab)  1 tab PO Q6H PRN


   PRN Reason: Pain, Moderate (4-6)


   Last Admin: 07/19/21 01:22 Dose:  1 tab


   Documented by: 


Pantoprazole Sodium (Pantoprazole 40 Mg Tab)  40 mg PO BIDAC Replaced by [facility]


   Last Admin: 07/21/21 17:35 Dose:  40 mg


   Documented by: 


Peritoneal Dialysis Solution (Dialysate Pd2 2.5% Soln 2000 Ml)  2,000 ml IP Q6HR

Replaced by [facility]


   Last Admin: 07/21/21 13:08 Dose:  2,000 ml


   Documented by: 


Sodium Chloride (Sodium Chloride 0.9% 10 Ml Flush Syringe)  10 ml IV BID Replaced by [facility]


   Last Admin: 07/21/21 10:17 Dose:  10 ml


   Documented by: 


Sodium Chloride (Sodium Chloride 0.9% 10 Ml Flush Syringe)  10 ml IV PRN PRN


   PRN Reason: LINE FLUSH


Sucralfate (Sucralfate 1 Gm Tab)  1 gm PO ACHS Replaced by [facility]


   Last Admin: 07/21/21 17:35 Dose:  1 gm


   Documented by: 











Review of Systems


All systems: negative





- Cardiovascular


no chest pain





- Respiratory


no cough





- Gastrointestinal


abdominal pain, no nausea, no vomiting, no diarrhea





Exam


                                   Vital Signs











Temp Pulse Resp BP Pulse Ox


 


 99.6 F   113 H  20   220/135   100 


 


 07/18/21 14:02  07/18/21 14:02  07/18/21 14:02  07/18/21 14:02  07/18/21 14:02














- General physical appearance


Positive: well developed, no distress, no pain





- Respiratory


Positive: normal expansion, normal respiratory effort





- Cardiovascular


Heart Sounds: Present: S1 & S2





- Extremities


Extremities: no ischemia





- Abdomen


Abdomen: Present: soft, tender, other (PD catheter exiting left of midline with 

no signs of surrounding erythema or purulence.  Clear PD cath fluid in tubing.  

Tender to palpation generally.).  Absent: distended, guarding, rigid





Results





- Labs





                                 07/20/21 04:36





                                 07/20/21 04:36


                              Abnormal lab results











  07/21/21 07/21/21 Range/Units





  11:25 15:50 


 


POC Glucose  123 H  123 H  ()  mg/dL














- Imaging


CT scan - abdomen: report reviewed, image reviewed


CT scan - pelvis: report reviewed, image reviewed





Assessment and Plan





71-year-old female with acute abdominal pain, history of renal failure on 

peritoneal dialysis.  Abdominal pain of unclear etiology.  May be due to 

bacterial peritonitis (white blood cell count on peritoneal fluid cytology more 

than 3000)Or residual gastroenteritis.  Patient is afebrile and stable.  Patient

has no focal pathology requiring surgical intervention currently.  Would 

continue antibiotics per infectious disease and renal specialist.  No acute 

surgical intervention indicated at this time.  If renal and/or infectious 

disease believe that patient has unresolving bacterial peritonitis that may 

improve with removal of PD catheter, can remove PD catheter this admission if 

requested.

## 2021-07-21 NOTE — PROGRESS NOTE
Assessment and Plan


End-stage renal disease on peritoneal dialysis


Abdominal pain, PD peritonitis?


Anemia


Essential hypertension





Continue manual PD


Agree with antibiotics


Follow-up cultures


Epogen weekly 


No indication for TLC


Continue antihypertensives, monitor trend and assess need for further titration


Renally dose medications


Avoid nephrotoxins


Renal diet


Recommend bowel regimen 


Case discussed with surgery








Subjective


Date of service: 07/21/21


Principal diagnosis: Abdominal pain


Interval history: 


Denies further episodes of emesis.








Objective





- Exam


Narrative Exam: 


General: No acute distress


HEENT: Oral mucosa moist


Neck: Supple, no JVD


Chest: Clear to auscultation bilaterally


Heart: RRR, S1 and S2, no pericardial rub


Abdomen: Soft, nontender, no renal bruit.  PD site without erythema or signs of 

inflammation.  No drainage.


Extremity: No peripheral cyanosis, edema


Neurological: Alert, awake, no asterixis


Dermatology: No skin rash


Psych: No agitation


Musculoskeletal: No joint effusion








- Vital Signs


Vital signs: 


                               Vital Signs - 12hr











  07/21/21 07/21/21 07/21/21





  05:53 07:39 10:10


 


Temperature 98.2 F 98.2 F 


 


Pulse Rate 100 H 105 H 100 H


 


Respiratory 16 18 





Rate   


 


Blood Pressure 150/80 126/62 150/80


 


O2 Sat by Pulse 98 96 





Oximetry   














  07/21/21 07/21/21 07/21/21





  10:12 10:16 11:29


 


Temperature   98.5 F


 


Pulse Rate 100 H 100 H 98 H


 


Respiratory   16





Rate   


 


Blood Pressure 150/80 150/80 133/71


 


O2 Sat by Pulse   90





Oximetry   














  07/21/21 07/21/21





  13:08 15:52


 


Temperature  98.6 F


 


Pulse Rate 98 H 100 H


 


Respiratory  16





Rate  


 


Blood Pressure 133/71 116/65


 


O2 Sat by Pulse  96





Oximetry  














- Lab





                                 07/20/21 04:36





                                 07/20/21 04:36


                             Most recent lab results











Calcium  8.0 mg/dL (8.4-10.2)  L  07/20/21  04:36    


 


Magnesium  1.60 mg/dL (1.7-2.3)  L  07/18/21  14:09    














Medications & Allergies





- Medications


Allergies/Adverse Reactions: 


                                    Allergies





digoxin Allergy (Verified 07/18/21 17:56)


   Hives


lisinopril Allergy (Verified 07/18/21 17:57)


   Swelling


nitroglycerin Adverse Reaction (Verified 07/18/21 17:57)


   Rash, HEADACHES


shellfish derived Adverse Reaction (Verified 07/18/21 17:57)


   Swelling








Home Medications: 


                                Home Medications











 Medication  Instructions  Recorded  Confirmed  Last Taken  Type


 


NIFEdipine XL [Procardia Xl] 60 mg PO HS 08/21/15 05/12/21 10/24/20 History


 


carvediloL [Coreg] 6.25 mg PO BID 08/21/15 05/12/21 10/24/20 History


 


cloNIDine [Catapres] 0.2 mg PO DAILY 12/09/16 05/12/21 10/24/20 History


 


Gentamicin 0.1% Top Oint(Nf) 1 applic TP TID 10/25/20 05/12/21 Unknown History





[Gentamicin 0.1% Top Oint (Nf)]     


 


Cholecalciferol Vit D3 [Vitamin D3 3,000 unit PO DAILY  tablet 10/26/20 05/12/21

 Unknown Rx





1,000 UNIT TAB]     


 


Cinacalcet [Sensipar] 90 mg PO QDAY 05/12/21 05/12/21 Unknown History


 


Hydralazine HCl 50 mg PO TID 05/12/21 05/12/21 Unknown History


 


Aspirin EC [Halfprin EC] 81 mg PO QDAY #30 tablet. 05/20/21  Unknown Rx


 


Lispro Insulin [HumaLOG] 0 unit SUB-Q ACHS  units 05/20/21  Unknown Rx


 


Pantoprazole [Protonix TAB] 40 mg PO BIDAC #60 tablet 05/20/21  Unknown Rx


 


Sucralfate [Carafate] 1 gm PO ACHS #30 tablet 05/20/21  Unknown Rx


 


hydrOXYzine HCL [Atarax] 25 mg PO Q6H PRN #20 tablet 05/20/21  Unknown Rx


 


polyethylene glycoL 3350 [Miralax 17 gm PO QDAY #20 powd.pack 05/20/21  Unknown 

Rx





3350]     











Active Medications: 














Generic Name Dose Route Start Last Admin





  Trade Name Freq  PRN Reason Stop Dose Admin


 


Acetaminophen  650 mg  07/18/21 21:58  07/19/21 00:51





  Acetaminophen 325 Mg Tab  PO   650 mg





  Q4H PRN   Administration





  Pain MILD(1-3)/Fever >100.5/HA  


 


Aspirin  81 mg  07/19/21 17:00  07/21/21 10:10





  Aspirin Ec 81 Mg Tab  PO   81 mg





  QDAY ANTONY   Administration


 


Carvedilol  6.25 mg  07/19/21 22:00  07/21/21 10:12





  Carvedilol 6.25 Mg Tab  PO   6.25 mg





  BID ANTONY   Administration


 


Cholecalciferol  3,000 unit  07/20/21 10:00  07/21/21 10:10





  Cholecalciferol (Vit D3) 1000 Unit (25 Mcg) Tab  PO   3,000 unit





  DAILY ANTONY   Administration


 


Cinacalcet  90 mg  07/20/21 10:00  07/21/21 10:10





  Cinacalcet 30 Mg Tab  PO   90 mg





  QDAY ANTONY   Administration


 


Clonidine HCl  0.2 mg  07/20/21 10:00  07/21/21 10:10





  Clonidine 0.2 Mg Tab  PO   0.2 mg





  DAILY ANTONY   Administration


 


Heparin Sodium (Porcine)  5,000 unit  07/18/21 22:15  07/21/21 10:09





  Heparin 5,000 Unit/1 Ml Vial  SUB-Q   5,000 unit





  Q12HR ANTONY   Administration


 


Hydralazine HCl  50 mg  07/19/21 20:00  07/21/21 13:08





  Hydralazine 25 Mg Tab  PO   50 mg





  TID ANTONY   Administration


 


Hydromorphone HCl  0.5 mg  07/18/21 22:04 





  Hydromorphone 1 Mg/1 Ml Inj  IV  





  Q3H PRN  





  Pain , Severe (7-10)  


 


Hydroxyzine HCl  25 mg  07/19/21 16:29  07/21/21 06:10





  Hydroxyzine Hcl 25 Mg Tab  PO   25 mg





  Q6H PRN   Administration





  Itching  


 


Cefepime HCl  1 gm in 100 mls @ 200 mls/hr  07/19/21 17:00  07/21/21 17:38





  Cefepime/Ns 1 Gm/100 Ml  IV   200 mls/hr





  Q24H ANTONY   Administration





  Protocol  


 


Nifedipine  60 mg  07/19/21 22:00  07/20/21 21:56





  Nifedipine Xl 60 Mg Tab  PO   60 mg





  HS ANTONY   Administration


 


Ondansetron HCl  4 mg  07/18/21 21:58 





  Ondansetron 4 Mg/2 Ml Inj  IV  





  Q8H PRN  





  Nausea And Vomiting  


 


Oxycodone/Acetaminophen  1 tab  07/18/21 22:04  07/19/21 01:22





  Oxycodone /Acetaminophen 5-325mg Tab  PO   1 tab





  Q6H PRN   Administration





  Pain, Moderate (4-6)  


 


Pantoprazole Sodium  40 mg  07/19/21 16:30  07/21/21 17:35





  Pantoprazole 40 Mg Tab  PO   40 mg





  BIDAC ANTONY   Administration


 


Peritoneal Dialysis Solution  2,000 ml  07/19/21 12:00  07/21/21 13:08





  Dialysate Pd2 2.5% Soln 2000 Ml  IP   2,000 ml





  Q6HR ANTONY   Administration


 


Sodium Chloride  10 ml  07/18/21 22:00  07/21/21 10:17





  Sodium Chloride 0.9% 10 Ml Flush Syringe  IV   10 ml





  BID ANTONY   Administration


 


Sodium Chloride  10 ml  07/18/21 21:58 





  Sodium Chloride 0.9% 10 Ml Flush Syringe  IV  





  PRN PRN  





  LINE FLUSH  


 


Sucralfate  1 gm  07/19/21 16:30  07/21/21 17:35





  Sucralfate 1 Gm Tab  PO   1 gm





  [facility] ANTONY   Administration

## 2021-07-21 NOTE — PROGRESS NOTE
Assessment and Plan


71-year-old female with history of ESRD on peritoneal dialysis, diabetes 

mellitus, hypertension, admitted on 7/19/2021 secondary to 3-day history of 

acute abdominal pain with some nausea vomiting and diarrhea. On arrival, 

temperature 99.6, , RR 20, /135.  Temperature up to 100.2.  Initial 

WBC 21,000.  Peritoneal fluid cell count WBC 3982, segs 99%.  Blood culture 

7/19/2021 no growth today.  PD culture 7/18/2021 with many polymorphonuclears 

and no organisms.  CT of the abdomen shows increased ascites.





-- Sepsis


Patient has a high white count of 21,000, now downtrending


Diffuse abdominal pain, diarrhea however no discharge noted. 


Possibly secondary to peritonitis 


CT abdomen pelvis showed no bowel obstruction


Peritoneal fluid culture showed polymorphonuclear cells but no growth


Trend WBC on CBC


Antibiotic therapy with IV cefepime and vancomycin





--Acute peritonitis


Patient has peritoneal dialysis catheter for regular peritoneal dialysis


Diffuse abdominal pain and tenderness present.


High white count.,  Now downtrending


Consistent with sepsis and peritonitis.  


Patient initiated on IV cefepime and vancomycin


ID consult also requested





-- abdominal pain


Diffuse abdominal pain, diarrhea, and tenderness present on admission


Differential includes peritonitis versus gastroenteritis


CTAP: Significant increase in intra-abdominal ascites since May 2021 exam.  

Obstructive bowel disease unlikely.  Official read per radiology report





-- End stage renal disease   


Nephrology consult requested for better dialysis


Patient may need hemodialysis for now, no indication for TLC at this time


Renally adjust medications


Renal diet





-- Hypertension


Continue antihypertensives


as needed labetalol IV





-- IDDM (insulin dependent diabetes mellitus)


Check hemoglobin A1c and SSI coverage for now








--Anemia of chronic disease


Epogen q. weekly once BP controlled.





--Elevated troponin


Secondary to troponin leak








-- DVT prophylaxis


On heparin and GI prophylaxis





Dispo: pending clinical improvement, 


Full code, Renal diet





Daily clinical course:


7/19/21; Patient doing well on encounter.  She states that she believes she ate 

something that "did not agree with my stomach" and subsequently that is why she 

felt the severe abdominal pain.  He states that she feels much better today.  

Yesterday she states that she was feeling tenderness and had experienced 

symptoms of diarrhea.  The diarrhea is now resolved according patient.  She 

denied any type of complications from her peritoneal dialysis catheter.  She 

denied any discharge or drainage from her PD catheter insertion site.  Remainder

of ROS negative except for stated above





7/20/21: PD dialysis fluid Cx neg, blood cx neg. Continue cefepime 1 g IV once a

day renally adjusted, Continue pulse vancomycin for now. f/u leukocytosis, will 

remove femoral TLC. follow clinically.  Ordered for repeat peritoneal dialysate 

culture





7/21/21: still c/o abdominal pain. cont abx, ID recommended surgical evaluation 

: placed consult.  Continue empiric antibiotic for now, follow dialysate culture

from yesterday.





Subjective


Date of service: 07/21/21


Principal diagnosis: Abdominal pain


Interval history: 





Patient seen and examined.  Medical records and medication list reviewed.  


No acute event overnight noted by the RN.  


Patient denies any chest pain or difficulty breathing.  Patient is tolerating 

diet.  


She continues to complains of diffuse abdominal pain


Discussed plan of care at bedside with patient.








Objective





- Exam


Narrative Exam: 





GENERAL:  well-developed and well-nourished elderly -American lying on 

bed appeared to be in no discomfort. 


HEENT: Normocephalic.  Atraumatic.  No conjunctival congestion or icterus. 

Patient has moist mucous membranes.


NECK: Supple.  Trachea midline.


CHEST/LUNGS: Clear to auscultated bilaterally, breathing nonlabored. No wheezes 

crackles or rhonchi.


HEART/CARDIOVASCULAR: Regular in rate and rhythm.  S1 and S2 positive.


ABDOMEN: Abdomen is soft, + diffuse tender.  Patient has normal bowel sounds.  


SKIN: There is no rash.  Warm and dry.


NEURO:  No focal motor deficit.  Follows command.


MUSCULOSKELETAL: No joint effusion or tenderness.


EXTRIMITY: No edema, no cyanosis or clubbing.


PSYCH:  Cooperative.








- Constitutional


Vitals: 


                               Vital Signs - 12hr











  07/21/21 07/21/21 07/21/21





  05:53 07:39 10:10


 


Temperature 98.2 F 98.2 F 


 


Pulse Rate 100 H 105 H 100 H


 


Respiratory 16 18 





Rate   


 


Blood Pressure 150/80 126/62 150/80


 


O2 Sat by Pulse 98 96 





Oximetry   














  07/21/21 07/21/21 07/21/21





  10:12 10:16 11:29


 


Temperature   98.5 F


 


Pulse Rate 100 H 100 H 98 H


 


Respiratory   16





Rate   


 


Blood Pressure 150/80 150/80 133/71


 


O2 Sat by Pulse   90





Oximetry   














  07/21/21





  13:08


 


Temperature 


 


Pulse Rate 98 H


 


Respiratory 





Rate 


 


Blood Pressure 133/71


 


O2 Sat by Pulse 





Oximetry 














- Labs


CBC & Chem 7: 


                                 07/22/21 04:49





                                 07/22/21 04:49


Labs: 


                              Abnormal lab results











  07/20/21 07/21/21 Range/Units





  16:34 11:25 


 


POC Glucose  112 H  123 H  ()  mg/dL














HEART Score





- HEART Score


Troponin: 


                                        











Troponin T  0.092 ng/mL (0.00-0.029)  H  07/18/21  14:09

## 2021-07-21 NOTE — PROGRESS NOTE
Assessment and Plan


Cultures:


Blood culture 7/19/2021 no growth today.  


PD culture 7/18/2021 with many polymorphonuclears and no organisms.





Assessment: 71-year-old female with history of ESRD on peritoneal dialysis, 

diabetes mellitus, CHF, hypertension, admitted on 7/19/2021 secondary to 3-day 

history of acute abdominal pain, diffuse, 5 out of 10 in intensity associated 

with nausea vomiting and diarrhea after eating barbecue, ice cream and baked 

beans the day before admission:





#Sepsis: No fever, leukocytosis not better.  Secondary to peritonitis. 





#PD catheter associated peritonitis versus secondary peritonitis (gastro

enteritis): Abdominal pain not better, reporting cloudy peritoneal fluid today. 

No evidence of exit site or tunnel infection. Her PD catheter was placed 2 years

and a half ago.  She denies any changes in the color of the peritoneal fluid.  

She denies any previous PD peritonitis. 





#End-stage renal disease: PD


 


Recommendations:


-Consult surgery re: PD eval vs other possibilities of peritonitis


-Follow-up dialysate culture which is negative so far


-Repeat dialysate cell count and diff in 3-5 days - pending


-Continue cefepime 1 g IV once a day renally adjusted


-Continue pulse vancomycin for now


-f/u leukocytosis


 





Will follow.





Marisa Lynch MD


Infectious Diseases Consultant 


[facility] ([facility])


M [phone]


O [phone]


 





Subjective


Date of service: 07/21/21


Principal diagnosis: Abdominal pain


Interval history: 


Remains complaining of severe abdominal pain now 9 out of 10.  Reports nausea no

diarrhea.  Reporting cloudy peritoneal effluent








Objective





- Exam


Narrative Exam: 


General appearance: Alert in NAD pleasant


Eyes: anicteric sclerae, moist conjunctivae; no lid-lag; PERRLA 


HENT: Normocephalic, Atraumatic; normal external ears, nares open, oropharynx 

clear with moist mucous membranes and no oral thrush; normal hard and soft 

palate. 


Neck: supple, tracheal midline, no JVD


Lungs: CTA, with normal respiratory effort and no intercostal retractions 


CV: RRR no murmur


Abdomen: Soft, tenderness palpation diffusely, PD catheter clean no drainage


Extremities: no edema, no cyanosis


Skin: No rash. 


Psych: no agitated


Neuro: alert and oriented x 3. Moving all extermities





 





- Constitutional


Vitals: 


                                   Vital Signs











Temp Pulse Resp BP Pulse Ox


 


 98.5 F   98 H  16   133/71   90 


 


 07/21/21 11:29  07/21/21 13:08  07/21/21 11:29  07/21/21 13:08  07/21/21 11:29








                           Temperature -Last 24 Hours











Temperature                    98.5 F


 


Temperature                    98.2 F


 


Temperature                    98.2 F


 


Temperature                    98.4 F


 


Temperature                    97.9 F

















- Labs


CBC & Chem 7: 


                                 07/20/21 04:36





                                 07/20/21 04:36


Labs: 


                              Abnormal lab results











  07/20/21 07/21/21 07/21/21 Range/Units





  16:34 11:25 15:50 


 


POC Glucose  112 H  123 H  123 H  ()  mg/dL

## 2021-07-22 LAB
BASOPHILS # (AUTO): 0.1 K/MM3 (ref 0–0.1)
BASOPHILS NFR BLD AUTO: 0.7 % (ref 0–1.8)
BUN SERPL-MCNC: 62 MG/DL (ref 7–17)
BUN/CREAT SERPL: 5 %
CALCIUM SERPL-MCNC: 6.9 MG/DL (ref 8.4–10.2)
EOSINOPHIL # BLD AUTO: 0.6 K/MM3 (ref 0–0.4)
EOSINOPHIL NFR BLD AUTO: 6.7 % (ref 0–4.3)
HCT VFR BLD CALC: 24.6 % (ref 30.3–42.9)
HEMOLYSIS INDEX: 0
HGB BLD-MCNC: 7.7 GM/DL (ref 10.1–14.3)
LYMPHOCYTES # BLD AUTO: 1.2 K/MM3 (ref 1.2–5.4)
LYMPHOCYTES NFR BLD AUTO: 14.9 % (ref 13.4–35)
MCHC RBC AUTO-ENTMCNC: 32 % (ref 30–34)
MCV RBC AUTO: 96 FL (ref 79–97)
MONOCYTES # (AUTO): 0.7 K/MM3 (ref 0–0.8)
MONOCYTES % (AUTO): 8.7 % (ref 0–7.3)
PLATELET # BLD: 253 K/MM3 (ref 140–440)
RBC # BLD AUTO: 2.56 M/MM3 (ref 3.65–5.03)

## 2021-07-22 RX ADMIN — SUCRALFATE SCH GM: 1 TABLET ORAL at 17:15

## 2021-07-22 RX ADMIN — SUCRALFATE SCH: 1 TABLET ORAL at 17:15

## 2021-07-22 RX ADMIN — SUCRALFATE SCH GM: 1 TABLET ORAL at 21:12

## 2021-07-22 RX ADMIN — Medication SCH ML: at 10:12

## 2021-07-22 RX ADMIN — CEFEPIME SCH MLS/HR: 1 INJECTION, POWDER, FOR SOLUTION INTRAMUSCULAR; INTRAVENOUS at 17:43

## 2021-07-22 RX ADMIN — HEPARIN SODIUM SCH UNIT: 5000 INJECTION, SOLUTION INTRAVENOUS; SUBCUTANEOUS at 21:12

## 2021-07-22 RX ADMIN — NIFEDIPINE SCH MG: 60 TABLET, EXTENDED RELEASE ORAL at 21:12

## 2021-07-22 RX ADMIN — ASPIRIN SCH MG: 81 TABLET, COATED ORAL at 10:11

## 2021-07-22 RX ADMIN — PANTOPRAZOLE SODIUM SCH MG: 40 TABLET, DELAYED RELEASE ORAL at 07:35

## 2021-07-22 RX ADMIN — Medication SCH UNIT: at 10:09

## 2021-07-22 RX ADMIN — SODIUM CHLORIDE, SODIUM LACTATE, CALCIUM CHLORIDE, MAGNESIUM CHLORIDE AND DEXTROSE SCH ML: 2.5; 538; 448; 25.7; 5.08 INJECTION, SOLUTION INTRAPERITONEAL at 12:29

## 2021-07-22 RX ADMIN — SODIUM CHLORIDE, SODIUM LACTATE, CALCIUM CHLORIDE, MAGNESIUM CHLORIDE AND DEXTROSE SCH ML: 2.5; 538; 448; 25.7; 5.08 INJECTION, SOLUTION INTRAPERITONEAL at 02:45

## 2021-07-22 RX ADMIN — CINACALCET HYDROCHLORIDE SCH MG: 30 TABLET, FILM COATED ORAL at 10:11

## 2021-07-22 RX ADMIN — Medication SCH ML: at 21:13

## 2021-07-22 RX ADMIN — Medication SCH EACH: at 21:28

## 2021-07-22 RX ADMIN — Medication SCH EACH: at 12:44

## 2021-07-22 RX ADMIN — SUCRALFATE SCH GM: 1 TABLET ORAL at 12:27

## 2021-07-22 RX ADMIN — PANTOPRAZOLE SODIUM SCH: 40 TABLET, DELAYED RELEASE ORAL at 17:15

## 2021-07-22 RX ADMIN — SUCRALFATE SCH GM: 1 TABLET ORAL at 07:35

## 2021-07-22 RX ADMIN — PANTOPRAZOLE SODIUM SCH MG: 40 TABLET, DELAYED RELEASE ORAL at 17:15

## 2021-07-22 RX ADMIN — SODIUM CHLORIDE, SODIUM LACTATE, CALCIUM CHLORIDE, MAGNESIUM CHLORIDE AND DEXTROSE SCH ML: 2.5; 538; 448; 25.7; 5.08 INJECTION, SOLUTION INTRAPERITONEAL at 06:55

## 2021-07-22 RX ADMIN — HEPARIN SODIUM SCH UNIT: 5000 INJECTION, SOLUTION INTRAVENOUS; SUBCUTANEOUS at 10:07

## 2021-07-22 RX ADMIN — SODIUM CHLORIDE, SODIUM LACTATE, CALCIUM CHLORIDE, MAGNESIUM CHLORIDE AND DEXTROSE SCH ML: 2.5; 538; 448; 25.7; 5.08 INJECTION, SOLUTION INTRAPERITONEAL at 17:44

## 2021-07-22 RX ADMIN — SODIUM CHLORIDE, SODIUM LACTATE, CALCIUM CHLORIDE, MAGNESIUM CHLORIDE AND DEXTROSE SCH: 2.5; 538; 448; 25.7; 5.08 INJECTION, SOLUTION INTRAPERITONEAL at 17:15

## 2021-07-22 NOTE — PROGRESS NOTE
Assessment and Plan


71-year-old female with history of ESRD on peritoneal dialysis, diabetes 

mellitus, hypertension, admitted on 7/19/2021 secondary to 3-day history of 

acute abdominal pain with some nausea vomiting and diarrhea. On arrival, 

temperature 99.6, , RR 20, /135.  Temperature up to 100.2.  Initial 

WBC 21,000.  Peritoneal fluid cell count WBC 3982, segs 99%.  Blood culture 

7/19/2021 no growth today.  PD culture 7/18/2021 with many polymorphonuclears 

and no organisms.  CT of the abdomen shows increased ascites.





-- Sepsis


Patient has a high white count of 21,000, now downtrending


Diffuse abdominal pain, diarrhea however no discharge noted. 


Possibly secondary to peritonitis 


CT abdomen pelvis showed no bowel obstruction


Peritoneal fluid culture showed polymorphonuclear cells but no growth


Trend WBC on CBC


Antibiotic therapy with IV cefepime and vancomycin





--Acute peritonitis


Patient has peritoneal dialysis catheter for regular peritoneal dialysis


Diffuse abdominal pain and tenderness present.


High white count.,  Now downtrending


Consistent with sepsis and peritonitis.  


Patient initiated on IV cefepime and vancomycin


ID consult also requested





-- abdominal pain


Diffuse abdominal pain, diarrhea, and tenderness present on admission


Differential includes peritonitis versus gastroenteritis


CTAP: Significant increase in intra-abdominal ascites since May 2021 exam.  

Obstructive bowel disease unlikely.  Official read per radiology report





-- End stage renal disease   


Nephrology consult requested for better dialysis


Patient may need hemodialysis for now, no indication for TLC at this time


Renally adjust medications


Renal diet





-- Hypertension


Continue antihypertensives


as needed labetalol IV





-- IDDM (insulin dependent diabetes mellitus)


Check hemoglobin A1c and SSI coverage for now








--Anemia of chronic disease


Epogen q. weekly once BP controlled.





--Elevated troponin


Secondary to troponin leak








-- DVT prophylaxis


On heparin and GI prophylaxis





Dispo: pending clinical improvement, 


Full code, Renal diet





Daily clinical course:


7/19/21; Patient doing well on encounter.  She states that she believes she ate 

something that "did not agree with my stomach" and subsequently that is why she 

felt the severe abdominal pain.  He states that she feels much better today.  

Yesterday she states that she was feeling tenderness and had experienced 

symptoms of diarrhea.  The diarrhea is now resolved according patient.  She 

denied any type of complications from her peritoneal dialysis catheter.  She 

denied any discharge or drainage from her PD catheter insertion site.  Remainder

of ROS negative except for stated above





7/20/21: PD dialysis fluid Cx neg, blood cx neg. Continue cefepime 1 g IV once a

day renally adjusted, Continue pulse vancomycin for now. f/u leukocytosis, will 

remove femoral TLC. follow clinically.  Ordered for repeat peritoneal dialysate 

culture





7/21/21: still c/o abdominal pain. cont abx, ID recommended surgical evaluation 

: placed consult.  Continue empiric antibiotic for now, follow dialysate culture

from yesterday.





7/22/21: Patient clinically much improved today, WBC count normal, dialysate 

culture from 7/20/21 showed no growth.  Patient declines any abdominal pain 

today.  No surgical intervention needed per general surgery.  Discussed with ID 

and recommended ceftazidime and vancomycin with peritoneal dialysis for total 2 

weeks.  We will request case management and nephrology service to arrange home 

antibiotic regimen treatment from dialysis. 











Subjective


Date of service: 07/22/21


Principal diagnosis: Abdominal pain


Interval history: 





Patient seen and examined.  Medical records and medication list reviewed.  


No acute event overnight noted by the RN.  


Patient denies any chest pain or difficulty breathing.  Patient is tolerating 

diet.  


She states her abdominal pain has improved


Discussed plan of care at bedside with patient.








Objective





- Exam


Narrative Exam: 





GENERAL:  well-developed and well-nourished elderly -American lying on 

bed appeared to be in no discomfort. 


HEENT: Normocephalic.  Atraumatic.  No conjunctival congestion or icterus. 

Patient has moist mucous membranes.


NECK: Supple.  Trachea midline.


CHEST/LUNGS: Clear to auscultated bilaterally, breathing nonlabored. No wheezes 

crackles or rhonchi.


HEART/CARDIOVASCULAR: Regular in rate and rhythm.  S1 and S2 positive.


ABDOMEN: Abdomen is soft, no tender.  Patient has normal bowel sounds.  


SKIN: There is no rash.  Warm and dry.


NEURO:  No focal motor deficit.  Follows command.


MUSCULOSKELETAL: No joint effusion or tenderness.


EXTRIMITY: No edema, no cyanosis or clubbing.


PSYCH:  Cooperative.








- Constitutional


Vitals: 


                               Vital Signs - 12hr











  07/22/21 07/22/21 07/22/21





  06:51 07:17 07:39


 


Temperature   98.5 F


 


Pulse Rate  97 H 94 H


 


Respiratory   18





Rate   


 


Blood Pressure 127/70 138/79 


 


Blood Pressure   138/79





[Right]   


 


O2 Sat by Pulse  94 97





Oximetry   














  07/22/21 07/22/21 07/22/21





  10:07 10:10 13:06


 


Temperature   


 


Pulse Rate 94 H 94 H 97 H


 


Respiratory   18





Rate   


 


Blood Pressure 138/79 138/79 157/81


 


Blood Pressure   





[Right]   


 


O2 Sat by Pulse   93





Oximetry   














  07/22/21 07/22/21 07/22/21





  13:07 13:08 13:21


 


Temperature   


 


Pulse Rate 92 H 96 H 101 H


 


Respiratory  18 16





Rate   


 


Blood Pressure   170/83


 


Blood Pressure  157/81 





[Right]   


 


O2 Sat by Pulse 94 96 93





Oximetry   














  07/22/21





  13:23


 


Temperature 98.2 F


 


Pulse Rate 


 


Respiratory 





Rate 


 


Blood Pressure 


 


Blood Pressure 





[Right] 


 


O2 Sat by Pulse 





Oximetry 














- Labs


CBC & Chem 7: 


                                 07/22/21 04:49





                                 07/22/21 04:49


Labs: 


                              Abnormal lab results











  07/21/21 07/22/21 07/22/21 Range/Units





  15:50 04:49 04:49 


 


RBC   2.56 L   (3.65-5.03)  M/mm3


 


Hgb   7.7 L   (10.1-14.3)  gm/dl


 


Hct   24.6 L   (30.3-42.9)  %


 


RDW   19.1 H   (13.2-15.2)  %


 


Mono % (Auto)   8.7 H   (0.0-7.3)  %


 


Eos % (Auto)   6.7 H   (0.0-4.3)  %


 


Eos # (Auto)   0.6 H   (0.0-0.4)  K/mm3


 


Chloride    96.0 L  ()  mmol/L


 


BUN    62 H  (7-17)  mg/dL


 


Creatinine    12.5 H  (0.6-1.2)  mg/dL


 


Glucose    131 H  ()  mg/dL


 


POC Glucose  123 H    ()  mg/dL


 


Calcium    6.9 L  (8.4-10.2)  mg/dL


 


PTH Intact     (15-65)  pg/mL














  07/22/21 07/22/21 Range/Units





  07:29 12:15 


 


RBC    (3.65-5.03)  M/mm3


 


Hgb    (10.1-14.3)  gm/dl


 


Hct    (30.3-42.9)  %


 


RDW    (13.2-15.2)  %


 


Mono % (Auto)    (0.0-7.3)  %


 


Eos % (Auto)    (0.0-4.3)  %


 


Eos # (Auto)    (0.0-0.4)  K/mm3


 


Chloride    ()  mmol/L


 


BUN    (7-17)  mg/dL


 


Creatinine    (0.6-1.2)  mg/dL


 


Glucose    ()  mg/dL


 


POC Glucose  127 H   ()  mg/dL


 


Calcium    (8.4-10.2)  mg/dL


 


PTH Intact   681.4 H  (15-65)  pg/mL














HEART Score





- HEART Score


Troponin: 


                                        











Troponin T  0.092 ng/mL (0.00-0.029)  H  07/18/21  14:09

## 2021-07-22 NOTE — PROGRESS NOTE
Assessment and Plan


Cultures:


Blood culture 7/19/2021 no growth today.  


PD culture 7/18/2021 with many polymorphonuclears and no organisms.





Assessment: 71-year-old female with history of ESRD on peritoneal dialysis, 

diabetes mellitus, CHF, hypertension, admitted on 7/19/2021 secondary to 3-day 

history of acute abdominal pain, diffuse, 5 out of 10 in intensity associated 

with nausea vomiting and diarrhea after eating barbecue, ice cream and baked 

beans the day before admission:





#Sepsis: Leukocytosis resolved.  Secondary to peritonitis. 





#PD catheter associated peritonitis versus secondary peritonitis 

(gastroenteritis): Abdominal pain better.  No evidence of exit site or tunnel 

infection. Her PD catheter was placed 2 years and a half ago.  She denies any 

changes in the color of the peritoneal fluid.  She denies any previous PD 

peritonitis. Surgery eval no acute interventions.





#End-stage renal disease: PD


 


Recommendations:


-Okay to discharge home on PD ceftazidime and vancomycin per PD dialysis 

nurse/renal doctor total 14 days until 8/2/2021


-Needs a repeat peritoneal cell count and differential,repeat fluid on 7/20/2021

only sent for Gram stain and culture


-Okay to discharge from ID standpoint patient wants to go home


 





Will follow.





Marisa Lynch MD


Infectious Diseases Consultant 


[facility] ([facility])


M [phone]


O [phone]


 





Subjective


Date of service: 07/22/21


Principal diagnosis: Abdominal pain


Interval history: 


Patient feels much better, abdominal pain resolved, denies any fever, nausea, 

vomiting, diarrhea.  She wants to go home.








Objective





- Exam


Narrative Exam: 


General appearance: Alert in NAD pleasant


Eyes: anicteric sclerae, moist conjunctivae; no lid-lag; PERRLA 


HENT: Normocephalic, Atraumatic; normal external ears, nares open, oropharynx 

clear with moist mucous membranes and no oral thrush; normal hard and soft p

alate. 


Neck: supple, tracheal midline, no JVD


Lungs: CTA, with normal respiratory effort and no intercostal retractions 


CV: RRR no murmur


Abdomen: Soft, nontender, PD catheter clean no drainage


Extremities: no edema, no cyanosis


Skin: No rash. 


Psych: no agitated


Neuro: alert and oriented x 3. Moving all extermities





 





- Constitutional


Vitals: 


                                   Vital Signs











Temp Pulse Resp BP Pulse Ox


 


 98.2 F   101 H  16   170/83   93 


 


 07/22/21 13:23  07/22/21 13:21  07/22/21 13:21  07/22/21 13:21  07/22/21 13:21








                           Temperature -Last 24 Hours











Temperature                    98.2 F


 


Temperature                    98.5 F


 


Temperature                    98.8 F


 


Temperature                    98.0 F


 


Temperature                    98.6 F

















- Labs


CBC & Chem 7: 


                                 07/22/21 04:49





                                 07/22/21 04:49


Labs: 


                              Abnormal lab results











  07/22/21 07/22/21 07/22/21 Range/Units





  04:49 04:49 07:29 


 


RBC  2.56 L    (3.65-5.03)  M/mm3


 


Hgb  7.7 L    (10.1-14.3)  gm/dl


 


Hct  24.6 L    (30.3-42.9)  %


 


RDW  19.1 H    (13.2-15.2)  %


 


Mono % (Auto)  8.7 H    (0.0-7.3)  %


 


Eos % (Auto)  6.7 H    (0.0-4.3)  %


 


Eos # (Auto)  0.6 H    (0.0-0.4)  K/mm3


 


Chloride   96.0 L   ()  mmol/L


 


BUN   62 H   (7-17)  mg/dL


 


Creatinine   12.5 H   (0.6-1.2)  mg/dL


 


Glucose   131 H   ()  mg/dL


 


POC Glucose    127 H  ()  mg/dL


 


Calcium   6.9 L   (8.4-10.2)  mg/dL


 


PTH Intact     (15-65)  pg/mL














  07/22/21 Range/Units





  12:15 


 


RBC   (3.65-5.03)  M/mm3


 


Hgb   (10.1-14.3)  gm/dl


 


Hct   (30.3-42.9)  %


 


RDW   (13.2-15.2)  %


 


Mono % (Auto)   (0.0-7.3)  %


 


Eos % (Auto)   (0.0-4.3)  %


 


Eos # (Auto)   (0.0-0.4)  K/mm3


 


Chloride   ()  mmol/L


 


BUN   (7-17)  mg/dL


 


Creatinine   (0.6-1.2)  mg/dL


 


Glucose   ()  mg/dL


 


POC Glucose   ()  mg/dL


 


Calcium   (8.4-10.2)  mg/dL


 


PTH Intact  681.4 H  (15-65)  pg/mL

## 2021-07-22 NOTE — PROGRESS NOTE
Assessment and Plan


End-stage renal disease on peritoneal dialysis


Abdominal pain, PD peritonitis?


Anemia


Essential hypertension


Hypocalcemia





Continue manual PD


Agree with antibiotics


Follow-up cultures


Epogen weekly 


No indication for TLC


Check ionized calcium


Start calcium supplementation


Check PTH


Continue antihypertensives, monitor trend and assess need for further titration


Renally dose medications


Avoid nephrotoxins


Renal diet


Recommend bowel regimen 


Case discussed with surgery








Subjective


Date of service: 07/22/21


Principal diagnosis: Abdominal pain


Interval history: 


Denies further episodes of emesis. Notes resolution of abdominal pain.








Objective





- Exam


Narrative Exam: 


General: No acute distress


HEENT: Oral mucosa moist


Neck: Supple, no JVD


Chest: Clear to auscultation bilaterally


Heart: RRR, S1 and S2, no pericardial rub


Abdomen: Soft, nontender, no renal bruit.  PD site without erythema or signs of 

inflammation.  No drainage.


Extremity: No peripheral cyanosis, edema


Neurological: Alert, awake, no asterixis


Dermatology: No skin rash


Psych: No agitation


Musculoskeletal: No joint effusion








- Vital Signs


Vital signs: 


                               Vital Signs - 12hr











  07/21/21 07/22/21 07/22/21





  23:22 02:41 02:44


 


Temperature 98.0 F  


 


Pulse Rate 99 H  98 H


 


Respiratory 18  





Rate   


 


Blood Pressure 138/77 131/69 


 


Blood Pressure   





[Right]   


 


O2 Sat by Pulse 96  





Oximetry   














  07/22/21 07/22/21 07/22/21





  03:18 06:51 07:17


 


Temperature 98.8 F  


 


Pulse Rate 104 H  97 H


 


Respiratory 18  





Rate   


 


Blood Pressure 147/70 127/70 138/79


 


Blood Pressure   





[Right]   


 


O2 Sat by Pulse 92  94





Oximetry   














  07/22/21 07/22/21 07/22/21





  07:39 10:07 10:10


 


Temperature 98.5 F  


 


Pulse Rate 94 H 94 H 94 H


 


Respiratory 18  





Rate   


 


Blood Pressure  138/79 138/79


 


Blood Pressure 138/79  





[Right]   


 


O2 Sat by Pulse 97  





Oximetry   














- Lab





                                 07/22/21 04:49





                                 07/22/21 04:49


                             Most recent lab results











Calcium  6.9 mg/dL (8.4-10.2)  L  07/22/21  04:49    


 


Magnesium  1.60 mg/dL (1.7-2.3)  L  07/18/21  14:09    














Medications & Allergies





- Medications


Allergies/Adverse Reactions: 


                                    Allergies





digoxin Allergy (Verified 07/18/21 17:56)


   Hives


lisinopril Allergy (Verified 07/18/21 17:57)


   Swelling


nitroglycerin Adverse Reaction (Verified 07/18/21 17:57)


   Rash, HEADACHES


shellfish derived Adverse Reaction (Verified 07/18/21 17:57)


   Swelling








Home Medications: 


                                Home Medications











 Medication  Instructions  Recorded  Confirmed  Last Taken  Type


 


NIFEdipine XL [Procardia Xl] 60 mg PO HS 08/21/15 05/12/21 10/24/20 History


 


carvediloL [Coreg] 6.25 mg PO BID 08/21/15 05/12/21 10/24/20 History


 


cloNIDine [Catapres] 0.2 mg PO DAILY 12/09/16 05/12/21 10/24/20 History


 


Gentamicin 0.1% Top Oint(Nf) 1 applic TP TID 10/25/20 05/12/21 Unknown History





[Gentamicin 0.1% Top Oint (Nf)]     


 


Cholecalciferol Vit D3 [Vitamin D3 3,000 unit PO DAILY  tablet 10/26/20 05/12/21

 Unknown Rx





1,000 UNIT TAB]     


 


Cinacalcet [Sensipar] 90 mg PO QDAY 05/12/21 05/12/21 Unknown History


 


Hydralazine HCl 50 mg PO TID 05/12/21 05/12/21 Unknown History


 


Aspirin EC [Halfprin EC] 81 mg PO QDAY #30 tablet.dr 05/20/21  Unknown Rx


 


Lispro Insulin [HumaLOG] 0 unit SUB-Q ACHS  units 05/20/21  Unknown Rx


 


Pantoprazole [Protonix TAB] 40 mg PO BIDAC #60 tablet 05/20/21  Unknown Rx


 


Sucralfate [Carafate] 1 gm PO ACHS #30 tablet 05/20/21  Unknown Rx


 


hydrOXYzine HCL [Atarax] 25 mg PO Q6H PRN #20 tablet 05/20/21  Unknown Rx


 


polyethylene glycoL 3350 [Miralax 17 gm PO QDAY #20 powd.pack 05/20/21  Unknown 

Rx





3350]     











Active Medications: 














Generic Name Dose Route Start Last Admin





  Trade Name Freq  PRN Reason Stop Dose Admin


 


Acetaminophen  650 mg  07/18/21 21:58  07/19/21 00:51





  Acetaminophen 325 Mg Tab  PO   650 mg





  Q4H PRN   Administration





  Pain MILD(1-3)/Fever >100.5/HA  


 


Aspirin  81 mg  07/19/21 17:00  07/22/21 10:11





  Aspirin Ec 81 Mg Tab  PO   81 mg





  QDAY ANTONY   Administration


 


Carvedilol  6.25 mg  07/19/21 22:00  07/22/21 10:10





  Carvedilol 6.25 Mg Tab  PO   6.25 mg





  BID ANTONY   Administration


 


Cholecalciferol  3,000 unit  07/20/21 10:00  07/22/21 10:09





  Cholecalciferol (Vit D3) 1000 Unit (25 Mcg) Tab  PO   3,000 unit





  DAILY ANTONY   Administration


 


Cinacalcet  90 mg  07/20/21 10:00  07/22/21 10:11





  Cinacalcet 30 Mg Tab  PO   90 mg





  QDAY ANTONY   Administration


 


Clonidine HCl  0.2 mg  07/20/21 10:00  07/22/21 10:10





  Clonidine 0.2 Mg Tab  PO   0.2 mg





  DAILY ANTONY   Administration


 


Heparin Sodium (Porcine)  5,000 unit  07/18/21 22:15  07/22/21 10:07





  Heparin 5,000 Unit/1 Ml Vial  SUB-Q   5,000 unit





  Q12HR ANTONY   Administration


 


Hydralazine HCl  50 mg  07/19/21 20:00  07/22/21 10:07





  Hydralazine 25 Mg Tab  PO   50 mg





  TID ANTONY   Administration


 


Hydromorphone HCl  0.5 mg  07/18/21 22:04 





  Hydromorphone 1 Mg/1 Ml Inj  IV  





  Q3H PRN  





  Pain , Severe (7-10)  


 


Hydroxyzine HCl  25 mg  07/19/21 16:29  07/21/21 06:10





  Hydroxyzine Hcl 25 Mg Tab  PO   25 mg





  Q6H PRN   Administration





  Itching  


 


Cefepime HCl  1 gm in 100 mls @ 200 mls/hr  07/19/21 17:00  07/21/21 17:38





  Cefepime/Ns 1 Gm/100 Ml  IV   200 mls/hr





  Q24H ANTONY   Administration





  Protocol  


 


Multivitamins/Minerals  1 each  07/22/21 12:00 





  Calcium Carb/Vit D3/Minerals 600 Mg/800 Units Tab  PO  





  BID ANTONY  


 


Nifedipine  60 mg  07/19/21 22:00  07/21/21 21:20





  Nifedipine Xl 60 Mg Tab  PO   60 mg





  HS ANTONY   Administration


 


Ondansetron HCl  4 mg  07/18/21 21:58 





  Ondansetron 4 Mg/2 Ml Inj  IV  





  Q8H PRN  





  Nausea And Vomiting  


 


Oxycodone/Acetaminophen  1 tab  07/18/21 22:04  07/19/21 01:22





  Oxycodone /Acetaminophen 5-325mg Tab  PO   1 tab





  Q6H PRN   Administration





  Pain, Moderate (4-6)  


 


Pantoprazole Sodium  40 mg  07/19/21 16:30  07/22/21 07:35





  Pantoprazole 40 Mg Tab  PO   40 mg





  BIDAC ANTONY   Administration


 


Peritoneal Dialysis Solution  2,000 ml  07/19/21 12:00  07/22/21 06:55





  Dialysate Pd2 2.5% Soln 2000 Ml  IP   2,000 ml





  Q6HR ANTONY   Administration


 


Sodium Chloride  10 ml  07/18/21 22:00  07/22/21 10:12





  Sodium Chloride 0.9% 10 Ml Flush Syringe  IV   10 ml





  BID ANTONY   Administration


 


Sodium Chloride  10 ml  07/18/21 21:58 





  Sodium Chloride 0.9% 10 Ml Flush Syringe  IV  





  PRN PRN  





  LINE FLUSH  


 


Sucralfate  1 gm  07/19/21 16:30  07/22/21 07:35





  Sucralfate 1 Gm Tab  PO   1 gm





  [facility] ANTONY   Administration

## 2021-07-23 VITALS — DIASTOLIC BLOOD PRESSURE: 68 MMHG | SYSTOLIC BLOOD PRESSURE: 128 MMHG

## 2021-07-23 RX ADMIN — SODIUM CHLORIDE, SODIUM LACTATE, CALCIUM CHLORIDE, MAGNESIUM CHLORIDE AND DEXTROSE SCH ML: 2.5; 538; 448; 25.7; 5.08 INJECTION, SOLUTION INTRAPERITONEAL at 05:50

## 2021-07-23 RX ADMIN — SUCRALFATE SCH GM: 1 TABLET ORAL at 08:00

## 2021-07-23 RX ADMIN — PANTOPRAZOLE SODIUM SCH MG: 40 TABLET, DELAYED RELEASE ORAL at 17:02

## 2021-07-23 RX ADMIN — HEPARIN SODIUM SCH UNIT: 5000 INJECTION, SOLUTION INTRAVENOUS; SUBCUTANEOUS at 09:03

## 2021-07-23 RX ADMIN — SODIUM CHLORIDE, SODIUM LACTATE, CALCIUM CHLORIDE, MAGNESIUM CHLORIDE AND DEXTROSE SCH ML: 2.5; 538; 448; 25.7; 5.08 INJECTION, SOLUTION INTRAPERITONEAL at 17:03

## 2021-07-23 RX ADMIN — CEFEPIME SCH MLS/HR: 1 INJECTION, POWDER, FOR SOLUTION INTRAMUSCULAR; INTRAVENOUS at 17:03

## 2021-07-23 RX ADMIN — SODIUM CHLORIDE, SODIUM LACTATE, CALCIUM CHLORIDE, MAGNESIUM CHLORIDE AND DEXTROSE SCH ML: 2.5; 538; 448; 25.7; 5.08 INJECTION, SOLUTION INTRAPERITONEAL at 00:00

## 2021-07-23 RX ADMIN — PANTOPRAZOLE SODIUM SCH MG: 40 TABLET, DELAYED RELEASE ORAL at 08:04

## 2021-07-23 RX ADMIN — Medication SCH UNIT: at 09:02

## 2021-07-23 RX ADMIN — ASPIRIN SCH MG: 81 TABLET, COATED ORAL at 09:01

## 2021-07-23 RX ADMIN — Medication SCH ML: at 09:02

## 2021-07-23 RX ADMIN — SODIUM CHLORIDE, SODIUM LACTATE, CALCIUM CHLORIDE, MAGNESIUM CHLORIDE AND DEXTROSE SCH ML: 2.5; 538; 448; 25.7; 5.08 INJECTION, SOLUTION INTRAPERITONEAL at 12:51

## 2021-07-23 RX ADMIN — SUCRALFATE SCH GM: 1 TABLET ORAL at 17:02

## 2021-07-23 RX ADMIN — SUCRALFATE SCH GM: 1 TABLET ORAL at 11:49

## 2021-07-23 RX ADMIN — Medication SCH EACH: at 09:02

## 2021-07-23 RX ADMIN — CINACALCET HYDROCHLORIDE SCH MG: 30 TABLET, FILM COATED ORAL at 09:01

## 2021-07-23 NOTE — PROGRESS NOTE
Assessment and Plan


71-year-old female with history of ESRD on peritoneal dialysis, diabetes 

mellitus, hypertension, admitted on 7/19/2021 secondary to 3-day history of 

acute abdominal pain with some nausea vomiting and diarrhea. On arrival, 

temperature 99.6, , RR 20, /135.  Temperature up to 100.2.  Initial 

WBC 21,000.  Peritoneal fluid cell count WBC 3982, segs 99%.  Blood culture 

7/19/2021 no growth today.  PD culture 7/18/2021 with many polymorphonuclears 

and no organisms.  CT of the abdomen shows increased ascites.





-- Sepsis


Patient has a high white count of 21,000, now downtrending


Diffuse abdominal pain, diarrhea however no discharge noted. 


Possibly secondary to peritonitis 


CT abdomen pelvis showed no bowel obstruction


Peritoneal fluid culture showed polymorphonuclear cells but no growth


Trend WBC on CBC


Antibiotic therapy with IV cefepime and vancomycin





--Acute peritonitis


Patient has peritoneal dialysis catheter for regular peritoneal dialysis


Diffuse abdominal pain and tenderness present.


High white count.,  Now downtrending


Consistent with sepsis and peritonitis.  


Patient initiated on IV cefepime and vancomycin


ID consult also requested





-- abdominal pain


Diffuse abdominal pain, diarrhea, and tenderness present on admission


Differential includes peritonitis versus gastroenteritis


CTAP: Significant increase in intra-abdominal ascites since May 2021 exam.  

Obstructive bowel disease unlikely.  Official read per radiology report





-- End stage renal disease   


Nephrology consult requested for better dialysis


Patient may need hemodialysis for now, no indication for TLC at this time


Renally adjust medications


Renal diet





-- Hypertension


Continue antihypertensives


as needed labetalol IV





-- IDDM (insulin dependent diabetes mellitus)


Check hemoglobin A1c and SSI coverage for now








--Anemia of chronic disease


Epogen q. weekly once BP controlled.





--Elevated troponin


Secondary to troponin leak








-- DVT prophylaxis


On heparin and GI prophylaxis





Dispo: pending clinical improvement, 


Full code, Renal diet





Daily clinical course:


7/19/21; Patient doing well on encounter.  She states that she believes she ate 

something that "did not agree with my stomach" and subsequently that is why she 

felt the severe abdominal pain.  He states that she feels much better today.  

Yesterday she states that she was feeling tenderness and had experienced 

symptoms of diarrhea.  The diarrhea is now resolved according patient.  She 

denied any type of complications from her peritoneal dialysis catheter.  She 

denied any discharge or drainage from her PD catheter insertion site.  Remainder

of ROS negative except for stated above





7/20/21: PD dialysis fluid Cx neg, blood cx neg. Continue cefepime 1 g IV once a

day renally adjusted, Continue pulse vancomycin for now. f/u leukocytosis, will 

remove femoral TLC. follow clinically.  Ordered for repeat peritoneal dialysate 

culture





7/21/21: still c/o abdominal pain. cont abx, ID recommended surgical evaluation 

: placed consult.  Continue empiric antibiotic for now, follow dialysate culture

from yesterday.





7/22/21: Patient clinically much improved today, WBC count normal, dialysate 

culture from 7/20/21 showed no growth.  Patient declines any abdominal pain 

today.  No surgical intervention needed per general surgery.  Discussed with ID 

and recommended ceftazidime and vancomycin with peritoneal dialysis for total 2 

weeks.  We will request case management and nephrology service to arrange home 

antibiotic regimen treatment from dialysis. 





7/23/21: Pending home antibiotics set up with peritoneal dialysis.  Discussed 

with , nephrology and ID.  Continue current management and clear.  

Patient appears to be clinically stable for discharge.





Subjective


Date of service: 07/23/21


Principal diagnosis: Abdominal pain





Objective





- Constitutional


Vitals: 


                               Vital Signs - 12hr











  07/23/21 07/23/21 07/23/21





  04:04 05:57 06:35


 


Temperature 98.0 F  


 


Pulse Rate   92 H


 


Respiratory 18  





Rate   


 


Blood Pressure 130/72 162/84 


 


Blood Pressure   





[Right]   


 


O2 Sat by Pulse   95





Oximetry   














  07/23/21 07/23/21 07/23/21





  06:39 07:25 08:04


 


Temperature 97.8 F 97.9 F 


 


Pulse Rate 94 H 104 H 104 H


 


Respiratory 17 18 





Rate   


 


Blood Pressure  156/68 158/68


 


Blood Pressure 168/86  





[Right]   


 


O2 Sat by Pulse 96 90 





Oximetry   














  07/23/21 07/23/21 07/23/21





  09:03 11:55 13:15


 


Temperature  97.2 F L 


 


Pulse Rate 104 H 79 94 H


 


Respiratory  18 





Rate   


 


Blood Pressure 158/68 128/68 128/68


 


Blood Pressure   





[Right]   


 


O2 Sat by Pulse  96 





Oximetry   














- Labs


CBC & Chem 7: 


                                 07/22/21 04:49





                                 07/22/21 04:49


Labs: 


                              Abnormal lab results











  07/23/21 Range/Units





  07:26 


 


POC Glucose  138 H  ()  mg/dL














HEART Score





- HEART Score


Troponin: 


                                        











Troponin T  0.092 ng/mL (0.00-0.029)  H  07/18/21  14:09

## 2021-07-23 NOTE — PROGRESS NOTE
Assessment and Plan


End-stage renal disease on peritoneal dialysis


Abdominal pain, PD peritonitis?


Anemia


Essential hypertension


Hypocalcemia





Continue manual PD


Agree with antibiotics, communicated outpatient regimen as per ID 

recommendations to her PD nurse and primary nephrologist


Epogen weekly 


No indication for TLC


Continue calcium supplementation


Continue antihypertensives, monitor trend and assess need for further titration


Renally dose medications


Avoid nephrotoxins


Renal diet


Recommend bowel regimen 


Case discussed with surgery








Subjective


Date of service: 07/23/21


Principal diagnosis: Abdominal pain


Interval history: 


Feels better. Tolerating diet.








Objective





- Exam


Narrative Exam: 


General: No acute distress


HEENT: Oral mucosa moist


Neck: Supple, no JVD


Chest: Clear to auscultation bilaterally


Heart: RRR, S1 and S2, no pericardial rub


Abdomen: Soft, nontender, no renal bruit.  PD site without erythema or signs of 

inflammation.  No drainage.


Extremity: No peripheral cyanosis, edema


Neurological: Alert, awake, no asterixis


Dermatology: No skin rash


Psych: No agitation


Musculoskeletal: No joint effusion








- Vital Signs


Vital signs: 


                               Vital Signs - 12hr











  07/23/21 07/23/21 07/23/21





  09:03 11:55 13:15


 


Temperature  97.2 F L 


 


Pulse Rate 104 H 79 94 H


 


Respiratory  18 





Rate   


 


Blood Pressure 158/68 128/68 128/68


 


O2 Sat by Pulse  96 





Oximetry   














- Lab





                                 07/22/21 04:49





                                 07/22/21 04:49


                             Most recent lab results











Calcium  6.9 mg/dL (8.4-10.2)  L  07/22/21  04:49    


 


Magnesium  1.60 mg/dL (1.7-2.3)  L  07/18/21  14:09    














Medications & Allergies





- Medications


Allergies/Adverse Reactions: 


                                    Allergies





digoxin Allergy (Verified 07/18/21 17:56)


   Hives


lisinopril Allergy (Verified 07/18/21 17:57)


   Swelling


nitroglycerin Adverse Reaction (Verified 07/18/21 17:57)


   Rash, HEADACHES


shellfish derived Adverse Reaction (Verified 07/18/21 17:57)


   Swelling








Home Medications: 


                                Home Medications











 Medication  Instructions  Recorded  Confirmed  Last Taken  Type


 


NIFEdipine XL [Procardia Xl] 60 mg PO HS 08/21/15 05/12/21 10/24/20 History


 


carvediloL [Coreg] 6.25 mg PO BID 08/21/15 05/12/21 10/24/20 History


 


cloNIDine [Catapres] 0.2 mg PO DAILY 12/09/16 05/12/21 10/24/20 History


 


Gentamicin 0.1% Top Oint(Nf) 1 applic TP TID 10/25/20 05/12/21 Unknown History





[Gentamicin 0.1% Top Oint (Nf)]     


 


Cholecalciferol Vit D3 [Vitamin D3 3,000 unit PO DAILY  tablet 10/26/20 05/12/21

 Unknown Rx





1,000 UNIT TAB]     


 


Cinacalcet [Sensipar] 90 mg PO QDAY 05/12/21 05/12/21 Unknown History


 


Hydralazine HCl 50 mg PO TID 05/12/21 05/12/21 Unknown History


 


Aspirin EC [Halfprin EC] 81 mg PO QDAY #30 tablet.dr 05/20/21  Unknown Rx


 


Lispro Insulin [HumaLOG] 0 unit SUB-Q ACHS  units 05/20/21  Unknown Rx


 


Pantoprazole [Protonix TAB] 40 mg PO BIDAC #60 tablet 05/20/21  Unknown Rx


 


Sucralfate [Carafate] 1 gm PO ACHS #30 tablet 05/20/21  Unknown Rx


 


hydrOXYzine HCL [Atarax] 25 mg PO Q6H PRN #20 tablet 05/20/21  Unknown Rx


 


polyethylene glycoL 3350 [Miralax 17 gm PO QDAY #20 powd.pack 05/20/21  Unknown 

Rx





3350]

## 2022-02-07 ENCOUNTER — HOSPITAL ENCOUNTER (EMERGENCY)
Dept: HOSPITAL 5 - ED | Age: 72
Discharge: HOME | End: 2022-02-07
Payer: MEDICARE

## 2022-02-07 VITALS — SYSTOLIC BLOOD PRESSURE: 164 MMHG | DIASTOLIC BLOOD PRESSURE: 89 MMHG

## 2022-02-07 DIAGNOSIS — Z98.890: ICD-10-CM

## 2022-02-07 DIAGNOSIS — R10.84: ICD-10-CM

## 2022-02-07 DIAGNOSIS — E11.22: ICD-10-CM

## 2022-02-07 DIAGNOSIS — Z91.013: ICD-10-CM

## 2022-02-07 DIAGNOSIS — I13.2: ICD-10-CM

## 2022-02-07 DIAGNOSIS — M19.90: ICD-10-CM

## 2022-02-07 DIAGNOSIS — N18.6: ICD-10-CM

## 2022-02-07 DIAGNOSIS — Z79.899: ICD-10-CM

## 2022-02-07 DIAGNOSIS — I50.9: ICD-10-CM

## 2022-02-07 DIAGNOSIS — Z88.1: ICD-10-CM

## 2022-02-07 DIAGNOSIS — Z91.09: ICD-10-CM

## 2022-02-07 DIAGNOSIS — J18.9: Primary | ICD-10-CM

## 2022-02-07 DIAGNOSIS — Z99.2: ICD-10-CM

## 2022-02-07 LAB
ALBUMIN SERPL-MCNC: 3.5 G/DL (ref 3.9–5)
ALT SERPL-CCNC: 20 UNITS/L (ref 7–56)
BASOPHILS # (AUTO): 0 K/MM3 (ref 0–0.1)
BASOPHILS NFR BLD AUTO: 0.5 % (ref 0–1.8)
BILIRUB DIRECT SERPL-MCNC: < 0.2 MG/DL (ref 0–0.2)
BUN SERPL-MCNC: 70 MG/DL (ref 7–17)
BUN/CREAT SERPL: 6 %
CALCIUM SERPL-MCNC: 8.4 MG/DL (ref 8.4–10.2)
EOSINOPHIL # BLD AUTO: 0.2 K/MM3 (ref 0–0.4)
EOSINOPHIL NFR BLD AUTO: 2.4 % (ref 0–4.3)
HCT VFR BLD CALC: 30.2 % (ref 30.3–42.9)
HEMOLYSIS INDEX: 3
HGB BLD-MCNC: 9.5 GM/DL (ref 10.1–14.3)
LYMPHOCYTES # BLD AUTO: 0.5 K/MM3 (ref 1.2–5.4)
LYMPHOCYTES NFR BLD AUTO: 6.5 % (ref 13.4–35)
MCHC RBC AUTO-ENTMCNC: 32 % (ref 30–34)
MCV RBC AUTO: 92 FL (ref 79–97)
MONOCYTES # (AUTO): 0.4 K/MM3 (ref 0–0.8)
MONOCYTES % (AUTO): 4.8 % (ref 0–7.3)
PLATELET # BLD: 263 K/MM3 (ref 140–440)
RBC # BLD AUTO: 3.29 M/MM3 (ref 3.65–5.03)

## 2022-02-07 PROCEDURE — 85025 COMPLETE CBC W/AUTO DIFF WBC: CPT

## 2022-02-07 PROCEDURE — 96374 THER/PROPH/DIAG INJ IV PUSH: CPT

## 2022-02-07 PROCEDURE — 74176 CT ABD & PELVIS W/O CONTRAST: CPT

## 2022-02-07 PROCEDURE — 83690 ASSAY OF LIPASE: CPT

## 2022-02-07 PROCEDURE — 96375 TX/PRO/DX INJ NEW DRUG ADDON: CPT

## 2022-02-07 PROCEDURE — 87040 BLOOD CULTURE FOR BACTERIA: CPT

## 2022-02-07 PROCEDURE — 96376 TX/PRO/DX INJ SAME DRUG ADON: CPT

## 2022-02-07 PROCEDURE — 80048 BASIC METABOLIC PNL TOTAL CA: CPT

## 2022-02-07 PROCEDURE — 36415 COLL VENOUS BLD VENIPUNCTURE: CPT

## 2022-02-07 PROCEDURE — 80076 HEPATIC FUNCTION PANEL: CPT

## 2022-02-07 PROCEDURE — 99284 EMERGENCY DEPT VISIT MOD MDM: CPT

## 2022-02-07 PROCEDURE — 82140 ASSAY OF AMMONIA: CPT

## 2022-02-07 NOTE — EMERGENCY DEPARTMENT REPORT
ED Abdominal Pain HPI





- General


Chief Complaint: Abdominal Pain


Stated Complaint: ABD PAIN


Time Seen by Provider: 02/07/22 05:52


Source: patient


Mode of arrival: Ambulatory


Limitations: No Limitations





- History of Present Illness


Initial Comments: 





Patient presents with a 2 to 3-day history of generalized abdominal pain.  The 

pain that she states was severe.  It is in the lower abdomen and centralized.  

She has had nausea with vomiting.  She has been constipated.  She has no dysuria

or frequency and still does make urine.  Patient states that she is on 

peritoneal dialysis.  She has not had problems with dialysis as of this point.  

There is no fevers or chills.  She has no cough or congestion.  She does report 

that she had Covid 2 weeks ago but has tested negative since that time.  She has

no fevers.  There is no chills.  She denies trauma.  Patient has not eaten 

anything that tasted bad or unusual.  She has not been around anyone else that 

has been ill.  She is never had pain like this before.  It is sharp and aching. 

It does not radiate or migrate.  She has not noticed any specific aggravating or

alleviating factors.


Severity scale (0 -10): 10





- Related Data


                                Home Medications











 Medication  Instructions  Recorded  Confirmed  Last Taken


 


NIFEdipine XL [Procardia Xl] 60 mg PO HS 08/21/15 02/07/22 02/06/22


 


carvediloL [Coreg] 6.25 mg PO BID 08/21/15 02/07/22 02/06/22


 


cloNIDine [Catapres] 0.2 mg PO DAILY 12/09/16 02/07/22 02/06/22


 


Hydralazine HCl 50 mg PO TID 05/12/21 02/07/22 02/06/22








                                  Previous Rx's











 Medication  Instructions  Recorded  Last Taken  Type


 


Doxycycline Monohydrate 100 mg PO BID #20 cap 02/07/22 Unknown Rx





[Doxycycline Monohydrate CAP]    











                                    Allergies











Allergy/AdvReac Type Severity Reaction Status Date / Time


 


digoxin Allergy  Hives Verified 07/18/21 17:56


 


lisinopril Allergy  Swelling Verified 07/18/21 17:57


 


nitroglycerin AdvReac  Rash, Verified 07/18/21 17:57





   HEADACHES  


 


shellfish derived AdvReac  Swelling Verified 07/18/21 17:57














ED Review of Systems


ROS: 


Stated complaint: ABD PAIN


Other details as noted in HPI





Comment: All other systems reviewed and negative


Constitutional: denies: fever


Eyes: denies: vision change


ENT: denies: epistaxis


Respiratory: denies: cough


Cardiovascular: denies: chest pain


Endocrine: denies: unexplained weight loss


Gastrointestinal: as per HPI


Genitourinary: denies: dysuria


Musculoskeletal: denies: back pain


Skin: denies: rash


Neurological: denies: headache


Hematological/Lymphatic: denies: easy bruising





ED Past Medical Hx





- Past Medical History


Previous Medical History?: Yes


Hx Hypertension: Yes (FOR 10+ YRS, DR. ABAD- PCP)


Hx Congestive Heart Failure: Yes (IN 2007)


Hx Diabetes: Yes


Hx Liver Disease: No


Hx Renal Disease: Yes (CKD STAGE 5, DR. GUTIERREZ- NEPHROLOGIST)


Hx Sickle Cell Disease: No


Hx Arthritis: Yes (Gout)


Hx Seizures: No


Hx Asthma: Yes (AS A CHILD)


Hx HIV: No


Additional medical history: Dilated cardiomyopathy / PERITONEAL DYALISIS





- Surgical History


Past Surgical History?: Yes


Hx Pacemaker: No


Hx Appendectomy: Yes (IN 1985)


Additional Surgical History: tonsil removed





- Family History


Family history: hypertension





- Social History


Smoking Status: Never Smoker


Substance Use Type: None





- Medications


Home Medications: 


                                Home Medications











 Medication  Instructions  Recorded  Confirmed  Last Taken  Type


 


NIFEdipine XL [Procardia Xl] 60 mg PO HS 08/21/15 02/07/22 02/06/22 History


 


carvediloL [Coreg] 6.25 mg PO BID 08/21/15 02/07/22 02/06/22 History


 


cloNIDine [Catapres] 0.2 mg PO DAILY 12/09/16 02/07/22 02/06/22 History


 


Hydralazine HCl 50 mg PO TID 05/12/21 02/07/22 02/06/22 History


 


Doxycycline Monohydrate 100 mg PO BID #20 cap 02/07/22  Unknown Rx





[Doxycycline Monohydrate CAP]     














ED Physical Exam





- General


Limitations: No Limitations, Other (Pulse ox noted and normal)


General appearance: alert, in no apparent distress, other (Uncomfortable)





- Head


Head exam: Present: atraumatic, normocephalic





- Eye


Eye exam: Present: normal appearance, EOMI.  Absent: scleral icterus





- ENT


ENT exam: Present: normal orophraynx, normal external ear exam





- Neck


Neck exam: Present: normal inspection.  Absent: meningismus





- Respiratory


Respiratory exam: Present: normal lung sounds bilaterally.  Absent: respiratory 

distress





- Cardiovascular


Cardiovascular Exam: Present: regular rate, normal rhythm





- GI/Abdominal


GI/Abdominal exam: Present: soft, tenderness (Diffuse).  Absent: distended, 

guarding, rebound, pulsatile mass





- Extremities Exam


Extremities exam: Present: normal capillary refill





- Back Exam


Back exam: Absent: CVA tenderness (R), CVA tenderness (L)





- Neurological Exam


Neurological exam: Present: alert, oriented X3, CN II-XII intact.  Absent: motor

sensory deficit





- Psychiatric


Psychiatric exam: Present: normal affect, normal mood





- Skin


Skin exam: Present: warm, dry





ED Course


                                   Vital Signs











  02/07/22 02/07/22 02/07/22





  04:48 05:30 05:33


 


Temperature 98.6 F  


 


Pulse Rate 100 H 100 H 95 H


 


Respiratory 18 20 18





Rate   


 


Blood Pressure 188/101  


 


Blood Pressure   202/89





[Left]   


 


Blood Pressure  199/103 





[Right]   


 


O2 Sat by Pulse 97 95 95





Oximetry   














  02/07/22 02/07/22 02/07/22





  05:38 05:47 08:00


 


Temperature   


 


Pulse Rate   106 H


 


Respiratory   21





Rate   


 


Blood Pressure   213/103


 


Blood Pressure   





[Left]   


 


Blood Pressure   





[Right]   


 


O2 Sat by Pulse 96 96 99





Oximetry   














  02/07/22





  08:13


 


Temperature 


 


Pulse Rate 111 H


 


Respiratory 





Rate 


 


Blood Pressure 213/103


 


Blood Pressure 





[Left] 


 


Blood Pressure 





[Right] 


 


O2 Sat by Pulse 





Oximetry 














- Reevaluation(s)


Reevaluation #1: 





02/07/22 06:08


Labs are noted.  CT was ordered.  Further analgesics have been ordered.  Old 

records reviewed.


Reevaluation #2: 





02/07/22 10:52


Labs and CT were noted.  Patient was discharged.





ED Medical Decision Making





- Lab Data


Result diagrams: 


                                 02/07/22 05:24





                                 02/07/22 05:24





- Radiology Data


Radiology results: report reviewed (CT was read as probable infectious process 

right lung base with bilateral lower lobe bronchial wall thickening additionally

 reflective evidence of bronchitis.)





- Medical Decision Making





Patient presents with abdominal pain.  She does not have any obvious intra-

abdominal pathology.  She does not really have rebound or guarding suggestive of

 peritonitis or peritoneal finding.  She had had a previous admission for 

abdominal pain and was not found to have spontaneous bacterial peritonitis.  She

 does have a lower lobe infiltrative process consistent with pneumonia which 

could be giving her referred abdominal pain.  She does not have any distention 

or tympany that would suggest bowel obstruction and there is no CT evidence of 

bowel obstruction.  Her end-stage renal disease can be managed as an outpatient.

  She does not require emergent hemodialysis at this time.


Critical Care Time: No


Critical care attestation.: 


If time is entered above; I have spent that time in minutes in the direct care 

of this critically ill patient, excluding procedure time.








ED Disposition


Clinical Impression: 


 Generalized abdominal pain, ESRD (end stage renal disease) on dialysis





RLL pneumonia


Qualifiers:


 Pneumonia type: due to unspecified organism Qualified Code(s): J18.9 - 

Pneumonia, unspecified organism





Disposition: 01 HOME / SELF CARE / HOMELESS


Is pt being admited?: No


Condition: Stable


Instructions:  Abdominal Pain (ED), Bacterial Pneumonia (ED), Abdominal Pain, 

Adult, Easy-to-Read, Community-Acquired Pneumonia, Adult


Additional Instructions: 


Continue your dialysis.  Talk to your nephrologist about adjusting the timing 

and dialysate.  Return for problems.  Take the antibiotics.  Follow-up with your

 regular doctor for recheck.


Prescriptions: 


Doxycycline Monohydrate [Doxycycline Monohydrate CAP] 100 mg PO BID #20 cap


Referrals: 


KIARRA ABAD MD [Primary Care Provider] - 3-5 Days

## 2022-02-09 NOTE — CAT SCAN REPORT
CT ABDOMEN AND PELVIS WITHOUT CONTRAST



INDICATION / CLINICAL INFORMATION: generalized pain.



TECHNIQUE: Axial CT images were obtained through the abdomen and pelvis without IV contrast.  All CT 
scans at this location are performed using CT dose reduction for ALARA by means of automated exposure
 control. 



COMPARISON: CT abdomen and pelvis 7/18/2021



FINDINGS:



LOWER CHEST: Groundglass and airspace attenuation as well as bronchial wall thickening right lower lo
be additional bronchial wall thickening left lower lobe. Mild cardiomegaly. Mitral valve calcificatio
n and dense three-vessel coronary calcification.

LIVER: No significant abnormality.

GALLBLADDER: No significant abnormality.  

BILE DUCTS: No significant abnormality.

SPLEEN: No significant abnormality.

PANCREAS: No significant abnormality.

ADRENALS: No significant abnormality.

RIGHT KIDNEY / URETER: Stable appearance of peripherally calcified cyst lower pole right kidney. Mult
iple right renal cysts are demonstrated. No obvious solid mass.

LEFT KIDNEY / URETER: Nonobstructing nephrolith measuring 3-4 mm upper pole left kidney. Additional s
maller mid and lower pole nephroliths not excluded. Multiple left renal cysts. No obvious solid mass.




STOMACH / DUODENUM / SMALL BOWEL: No significant abnormality. 

COLON: Diverticulosis without acute inflammation. Postsurgical changes at cecum appear stable.

APPENDIX: Prior appendectomy.  

PERITONEUM: Small moderate volume ascites at least in part due to peritoneal dialysis.

LYMPH NODES: No significant adenopathy.

AORTA / ARTERIES: Severe atherosclerotic calcification without acute abnormality. 

IVC / VEINS: No significant abnormality.



URINARY BLADDER: Decompressed

REPRODUCTIVE ORGANS: Multiple uterine calcifications.



ADDITIONAL ABDOMINAL/PELVIC FINDINGS: None.



SKELETAL SYSTEM: No acute osseous findings. Multilevel facet arthropathy mid and lower lumbar spine.





IMPRESSION:

1. Possible infectious process right lung base. Bilateral lower lobe bronchial wall thickening thicke
criss additionally reflect evidence of bronchitis.

2. No acute findings within abdomen or pelvis, nonacute findings as detailed.



Signer Name: Deon Vaughn II, MD 

Signed: 2/7/2022 7:35 AM

Workstation Name: [id number]
saline lock

## 2022-08-12 ENCOUNTER — HOSPITAL ENCOUNTER (INPATIENT)
Dept: HOSPITAL 5 - ED | Age: 72
LOS: 3 days | Discharge: HOME HEALTH | DRG: 270 | End: 2022-08-15
Attending: STUDENT IN AN ORGANIZED HEALTH CARE EDUCATION/TRAINING PROGRAM | Admitting: INTERNAL MEDICINE
Payer: MEDICARE

## 2022-08-12 DIAGNOSIS — I42.0: ICD-10-CM

## 2022-08-12 DIAGNOSIS — I70.222: Primary | ICD-10-CM

## 2022-08-12 DIAGNOSIS — D64.9: ICD-10-CM

## 2022-08-12 DIAGNOSIS — E11.22: ICD-10-CM

## 2022-08-12 DIAGNOSIS — I77.1: ICD-10-CM

## 2022-08-12 DIAGNOSIS — I13.2: ICD-10-CM

## 2022-08-12 DIAGNOSIS — Z88.8: ICD-10-CM

## 2022-08-12 DIAGNOSIS — I50.9: ICD-10-CM

## 2022-08-12 DIAGNOSIS — E83.42: ICD-10-CM

## 2022-08-12 DIAGNOSIS — N18.6: ICD-10-CM

## 2022-08-12 LAB
ALBUMIN SERPL-MCNC: 3.4 G/DL (ref 3.9–5)
ALT SERPL-CCNC: 13 UNITS/L (ref 7–56)
APTT BLD: 29.2 SEC. (ref 24.2–36.6)
BASOPHILS # (AUTO): 0.2 K/MM3 (ref 0–0.1)
BASOPHILS NFR BLD AUTO: 2.1 % (ref 0–1.8)
BUN SERPL-MCNC: 48 MG/DL (ref 7–17)
BUN/CREAT SERPL: 5 %
CALCIUM SERPL-MCNC: 8.8 MG/DL (ref 8.4–10.2)
EOSINOPHIL # BLD AUTO: 0.3 K/MM3 (ref 0–0.4)
EOSINOPHIL NFR BLD AUTO: 4 % (ref 0–4.3)
HCT VFR BLD CALC: 33.5 % (ref 30.3–42.9)
HEMOLYSIS INDEX: 11
HGB BLD-MCNC: 10.3 GM/DL (ref 10.1–14.3)
INR PPP: 0.9 (ref 0.87–1.13)
LYMPHOCYTES # BLD AUTO: 0.7 K/MM3 (ref 1.2–5.4)
LYMPHOCYTES NFR BLD AUTO: 8.6 % (ref 13.4–35)
MCHC RBC AUTO-ENTMCNC: 31 % (ref 30–34)
MCV RBC AUTO: 99 FL (ref 79–97)
MONOCYTES # (AUTO): 0.5 K/MM3 (ref 0–0.8)
MONOCYTES % (AUTO): 5.3 % (ref 0–7.3)
PLATELET # BLD: 274 K/MM3 (ref 140–440)
RBC # BLD AUTO: 3.38 M/MM3 (ref 3.65–5.03)

## 2022-08-12 PROCEDURE — 3E05317 INTRODUCTION OF OTHER THROMBOLYTIC INTO PERIPHERAL ARTERY, PERCUTANEOUS APPROACH: ICD-10-PCS | Performed by: SURGERY

## 2022-08-12 PROCEDURE — 04CL3ZZ EXTIRPATION OF MATTER FROM LEFT FEMORAL ARTERY, PERCUTANEOUS APPROACH: ICD-10-PCS | Performed by: SURGERY

## 2022-08-12 PROCEDURE — 93005 ELECTROCARDIOGRAM TRACING: CPT

## 2022-08-12 PROCEDURE — 80053 COMPREHEN METABOLIC PANEL: CPT

## 2022-08-12 PROCEDURE — 37184 PRIM ART M-THRMBC 1ST VSL: CPT

## 2022-08-12 PROCEDURE — C1757 CATH, THROMBECTOMY/EMBOLECT: HCPCS

## 2022-08-12 PROCEDURE — 047N34Z DILATION OF LEFT POPLITEAL ARTERY WITH DRUG-ELUTING INTRALUMINAL DEVICE, PERCUTANEOUS APPROACH: ICD-10-PCS | Performed by: SURGERY

## 2022-08-12 PROCEDURE — 85520 HEPARIN ASSAY: CPT

## 2022-08-12 PROCEDURE — 04CN3ZZ EXTIRPATION OF MATTER FROM LEFT POPLITEAL ARTERY, PERCUTANEOUS APPROACH: ICD-10-PCS | Performed by: SURGERY

## 2022-08-12 PROCEDURE — 75635 CT ANGIO ABDOMINAL ARTERIES: CPT

## 2022-08-12 PROCEDURE — 80061 LIPID PANEL: CPT

## 2022-08-12 PROCEDURE — C1725 CATH, TRANSLUMIN NON-LASER: HCPCS

## 2022-08-12 PROCEDURE — 37185 PRIM ART M-THRMBC SBSQ VSL: CPT

## 2022-08-12 PROCEDURE — 36415 COLL VENOUS BLD VENIPUNCTURE: CPT

## 2022-08-12 PROCEDURE — 84484 ASSAY OF TROPONIN QUANT: CPT

## 2022-08-12 PROCEDURE — 85730 THROMBOPLASTIN TIME PARTIAL: CPT

## 2022-08-12 PROCEDURE — 71275 CT ANGIOGRAPHY CHEST: CPT

## 2022-08-12 PROCEDURE — C1874 STENT, COATED/COV W/DEL SYS: HCPCS

## 2022-08-12 PROCEDURE — C1884 EMBOLIZATION PROTECT SYST: HCPCS

## 2022-08-12 PROCEDURE — 04CS3ZZ EXTIRPATION OF MATTER FROM LEFT POSTERIOR TIBIAL ARTERY, PERCUTANEOUS APPROACH: ICD-10-PCS | Performed by: SURGERY

## 2022-08-12 PROCEDURE — 85610 PROTHROMBIN TIME: CPT

## 2022-08-12 PROCEDURE — C1769 GUIDE WIRE: HCPCS

## 2022-08-12 PROCEDURE — 83735 ASSAY OF MAGNESIUM: CPT

## 2022-08-12 PROCEDURE — C1760 CLOSURE DEV, VASC: HCPCS

## 2022-08-12 PROCEDURE — 85025 COMPLETE CBC W/AUTO DIFF WBC: CPT

## 2022-08-12 PROCEDURE — 82140 ASSAY OF AMMONIA: CPT

## 2022-08-12 PROCEDURE — 37226: CPT

## 2022-08-12 PROCEDURE — 82550 ASSAY OF CK (CPK): CPT

## 2022-08-12 PROCEDURE — 93306 TTE W/DOPPLER COMPLETE: CPT

## 2022-08-12 PROCEDURE — B44FZZ3 ULTRASONOGRAPHY OF RIGHT LOWER EXTREMITY ARTERIES, INTRAVASCULAR: ICD-10-PCS | Performed by: SURGERY

## 2022-08-12 PROCEDURE — C8929 TTE W OR WO FOL WCON,DOPPLER: HCPCS

## 2022-08-12 PROCEDURE — 75625 CONTRAST EXAM ABDOMINL AORTA: CPT

## 2022-08-12 PROCEDURE — 75710 ARTERY X-RAYS ARM/LEG: CPT

## 2022-08-12 PROCEDURE — C1887 CATHETER, GUIDING: HCPCS

## 2022-08-12 PROCEDURE — 76937 US GUIDE VASCULAR ACCESS: CPT

## 2022-08-12 RX ADMIN — FENTANYL CITRATE ONE MCG: 50 INJECTION, SOLUTION INTRAMUSCULAR; INTRAVENOUS at 19:10

## 2022-08-12 RX ADMIN — HEPARIN SODIUM SCH MLS/HR: 5000 INJECTION, SOLUTION INTRAVENOUS at 18:35

## 2022-08-12 RX ADMIN — MIDAZOLAM ONE MG: 1 INJECTION INTRAMUSCULAR; INTRAVENOUS at 19:10

## 2022-08-12 RX ADMIN — MIDAZOLAM ONE MG: 1 INJECTION INTRAMUSCULAR; INTRAVENOUS at 20:03

## 2022-08-12 RX ADMIN — NIFEDIPINE SCH MG: 60 TABLET, EXTENDED RELEASE ORAL at 22:59

## 2022-08-12 RX ADMIN — Medication SCH ML: at 22:59

## 2022-08-12 RX ADMIN — MIDAZOLAM ONE MG: 1 INJECTION INTRAMUSCULAR; INTRAVENOUS at 19:45

## 2022-08-12 RX ADMIN — HEPARIN SODIUM ONE UNIT: 1000 INJECTION, SOLUTION INTRAVENOUS; SUBCUTANEOUS at 20:03

## 2022-08-12 RX ADMIN — FENTANYL CITRATE ONE MCG: 50 INJECTION, SOLUTION INTRAMUSCULAR; INTRAVENOUS at 19:59

## 2022-08-12 RX ADMIN — FENTANYL CITRATE ONE MCG: 50 INJECTION, SOLUTION INTRAMUSCULAR; INTRAVENOUS at 19:45

## 2022-08-12 RX ADMIN — HEPARIN SODIUM ONE UNIT: 1000 INJECTION, SOLUTION INTRAVENOUS; SUBCUTANEOUS at 19:11

## 2022-08-12 NOTE — VASCULAR LAB REPORT
DUPLEX DOPPLER LOWER EXTREMITY VEINS, LEFT



INDICATION / CLINICAL INFORMATION: Acute left lower extremity pain..



TECHNIQUE: Duplex doppler imaging was performed through the veins of the left lower extremity using v
enous compression and other maneuvers.



COMPARISON: None available.



FINDINGS:



LEFT COMMON FEMORAL VEIN: Negative.

LEFT FEMORAL VEIN: Negative.

LEFT POPLITEAL VEIN: Negative.

LEFT CALF VEINS: Negative.



ADDITIONAL FINDINGS: None.



IMPRESSION:

1. No sonographic evidence for DVT in the left lower extremity.



Signer Name: Darren Rose MD 

Signed: 8/12/2022 5:23 PM

Workstation Name: [id number]

## 2022-08-12 NOTE — VASCULAR LAB REPORT
DUPLEX DOPPLER LOWER EXTREMITY ARTERIAL, LEFT



INDICATION / CLINICAL INFORMATION: Left lower extremity arterial insufficiency.



TECHNIQUE: Arterial duplex examination of the left lower extremity performed using B-mode, color flow
 and spectral Doppler assessment.



FINDINGS: 



LEFT:

Common Femoral Artery: PSV 66 cm/sec.  Monophasic waveform.

Proximal SFA: PSV 42 cm/sec.  Monophasic waveform.

Mid SFA: PSV 14 cm/sec.  Monophasic waveform.

Distal SFA: PSV No flow identified..

Popliteal Artery: No flow identified.

Posterior Tibial Artery: No flow identified.

Dorsalis Pedis Artery: No flow identified.

.



ADDITIONAL FINDINGS: None.



LEFT ENRIKE: Not calculated



IMPRESSION:

1. Marked peripheral artery disease of the left extremity with absent flow noted from the distal supe
rficial femoral artery through the lower ankle arteries. This was communicated to Dr. Kelly at 171
0.





======================

Ankle-Brachial Index (ENRIKE): 

======================

- Calcified arteries > 1.4 

- Normal = 0.9-1.4 

- Mild PAD = 0.7-0.89

- Moderate PAD = 0.51-0.69 

- Severe PAD < 0.5



======================

Doppler Waveform: 

======================

- Triphasic is normal. 

- Biphasic is abnormal if clear transition from triphasic signal along vascular tree.

- Monophasic is abnormal.



Signer Name: Delvin Iverson DO 

Signed: 8/12/2022 5:30 PM

Workstation Name: [id number]

## 2022-08-12 NOTE — OPERATIVE REPORT
Operative Report


Operative Report: 





Date of Procedure: 8/12/2022





Pre-operative Diagnosis: Acute Left Lower Extremity Ischemia





Post-operative Diagnosis: Same





Procedure(s):


1.  Ultrasound-Guided Access Right Common Femoral Artery


2.  Diagnostic Aortogram (No Previous Films for Comparison)


3.  Diagnostic Left Lower Extremity Angiogram (No Previous Films for Comparison)


4.  Percutaneous Mechanical Thrombectomy of Left Common Femoral Artery and 

Popliteal Artery with Penumbra Indigo Lightening CAT 7 Aspiration Catheter


5.  Angioplasty and Stent of Left Popliteal Artery with 6 x 80 Ger Balloon 

and 7 x 60 Hayley Drug-Eluting Stent


6.  Percutaneous Pharmacomechanical Thrombectomy of Left Posterior Tibial Artery

with 6 mg of tPA and Aspiration through a Navicross Catheter


7.  Closure of Right Femoral Arteriotomy with Perclose ProStyle Closure Device


8.  Radiologic Supervision with Interpretation


9.  Monitored Moderate Sedation (Total Anesthesia Time: 65 Minutes)





Surgeon: Filemon Ramos M.D. 





Assistant: None





Anesthesia: Local/Monitored Moderate Sedation





Total Anesthesia Time: 65 Minutes





EBL: Minimal





Counts: Correct





Complications: None





Condition: Stable





Specimen: None





Indication:





The patient is a 72-year-old female with a history of end-stage renal disease 

was on hemodialysis to peritoneal dialysis catheter.  She presented to the 

emergency department with complaints of left lower extremity pain and numbness. 

A CTA revealed thrombus within the left lower extremity.  She is in need of a 

diagnostic angiogram with possible intervention.  She was given the risk, 

benefits, and alternative procedures and consented to the procedure.





Angiographic Findings:





The diagnostic aortogram revealed that the aorta was patent without aneurysmal 

dilatation, thrombus, or flow-limiting stenosis.  The common iliac artery, 

hypogastric artery, and external iliac artery were patent without evidence of 

thrombus or flow-limiting stenosis.  There was occlusive thrombus within the 

common femoral artery.  There was evidence of thrombus in the distal profunda 

artery however the proximal segments and branches were patent.  There was 

occlusive thrombus within the popliteal artery extending from above the knee to 

below the knee.  The posterior tibial artery was occluded however there appeared

to be some retrograde flow into the distal artery through the peroneal artery.  

The peroneal artery was patent until the distal segment where there was evidence

of thrombus within the artery.  The anterior tibial artery had multiple segments

of thrombus within the mid artery and then occluded and was thrombosed 

throughout the remainder of the artery.





After intervention, femoral artery was patent without evidence of flow-limiting 

stenosis or significant residual thrombus.  The popliteal artery was patent with

no significant thrombus remaining in less than 20% residual stenosis.  The 

posterior tibial artery was patent throughout its course without significant 

residual thrombus and patent into the foot.





The patient regained motor and sensation after the completion of the case.  Her 

calf remains soft and without tenderness or additional signs of compartment 

syndrome.  We will continue to monitor the patient for evidence of compartment 

syndrome.





Description of Procedure:





The patient was brought to the Cath Lab and laid in supine position.  After 

timeout was performed she was sedated and her right groin was prepped and draped

in normal sterile fashion.  Ultrasound was used to identify the right common 

femoral artery and confirm patency.  Once patency was confirmed the overlying 

skin soft tissue was anesthetized with lidocaine.  An 11 blade was used to make 

a small stab incision and then a curved hemostat was used with ultrasound 

guidance to bluntly dissect down to the anterior surface of the right common 

femoral artery.  A 21-gauge micropuncture needle was used with ultrasound 

guidance into the right common femoral artery and a 0.018 micropuncture wire was

advanced to the artery.  The needle was removed and a micropuncture sheath was 

placed by Seldinger technique.  The dilator and wire were removed and a 0.035 

Bentson wire was advanced into the artery.  The micropuncture sheath was then 

removed and a 5 Barbadian sheath was placed by Seldinger technique.  An Omni Flush 

catheter was advanced over the Bentson wire and into the infrarenal aorta and a 

diagnostic aortogram was performed with the previously described findings.  The 

Omni Flush catheter and a 0.025 glide advantage wire were advanced up and over 

the bifurcation and the remainder of the left lower extremity angiogram was 

performed to previous described findings.  The advantage wire was advanced into 

the distal SFA and the 5 Barbadian sheath was exchanged for a 7 Barbadian 45 cm 

destination sheath by Seldinger technique.  At this point the patient was 

systemically heparinized with 4000 units of heparin IV.  A Navicross catheter 

and a 0.018 V 18 wire were used to cannulate the peroneal artery which was 

confirmed by angiogram.  I then advanced the Penumbra Indigo Lightening CAT 7 

Aspiration Catheter into the common femoral artery and perform percutaneous 

mechanical thrombectomy which resulted in a patent common femoral artery with 

minimal residual stenosis.  I advanced the catheter down into the popliteal 

artery and perform percutaneous mechanical thrombectomy with the catheter as 

well as the separator which resulted in no significant residual thrombus however

there was approximately 85% stenosis in a short segment of the above-knee popli

teal artery.  This also revealed that the proximal segment of the posterior 

tibial artery was now patent however the remainder was occluded with thrombus.  

I advanced the Navicross catheter and V 18 wire into the distal posterior tibial

artery was demonstrated some thrombus at the ankle however the medial and 

lateral plantar arteries were patent.  I injected 6 mg of tPA into the artery as

well as 5 mg of verapamil to break any spasm.  I then used the catheter to 

aspirate thrombus from the distal portion of the artery and the follow-up 

angiogram revealed a patent artery with minimal residual stenosis and flow into 

the foot.  I then advanced a 7 mm 0.014 Spider Filter Wire into the below-knee 

popliteal artery and performed angioplasty of the area of stenosis, within the 

above-knee popliteal artery, with a 6 x 80 Ger Balloon.  This resulted in 

approximately 50% residual stenosis.  I advanced a 7 x 60 Hayley Drug-Eluting 

Stent across the area of stenosis and deployed it.  I postdilated it with the 6 

x 80 Ger Balloon resulted in less than 20% residual stenosis and brisk flow

of contrast throughout the left lower extremity and into the posterior tibial 

artery.  At this point I recaptured the spider wire using the Navicross 

catheter.  I pulled the sheath back into the right external iliac artery and 

performed an angiogram revealing that the sheath was well above the bifurcation.

 This also revealed thrombus within the distal profunda artery.  I advanced a 

Bentson wire to the aorta and after removing the sheath used a Perclose ProStyle

Closure Device to close the right femoral arteriotomy.  The patient tolerated 

the procedure well and was transported to the [facility] in stable condition.

## 2022-08-12 NOTE — CAT SCAN REPORT
CTA ABDOMEN, PELVIS, AND LOWER EXTREMITIES WITH CONTRAST



INDICATION / CLINICAL INFORMATION: Acute left leg pain, weakness, arterial insufficie 100ml of omni35
0.



TECHNIQUE: Axial CT images were obtained through the abdomen, pelvis and lower extremities after inje
ction of 100 cc Omnipaque 350 IV contrast. 3 plane MIP / 3D reconstructions were produced. All CT sca
ns at this location are performed using CT dose reduction for ALARA by means of automated exposure co
ntrol. 



COMPARISON: 2/7/2022



FINDINGS:

CTA ABDOMEN:

Abdominal Aorta: Moderate atherosclerosis

Celiac Artery: No significant abnormality.

Superior Mesenteric Artery: No significant abnormality.

Right Renal Artery: Mild atherosclerosis

Left Renal Artery: Moderate to severe atherosclerosis

Inferior Mesenteric Artery: No significant abnormality.



CTA PELVIS:

RIGHT:

- Common Iliac Artery: Moderate atherosclerosis

- Internal Iliac Artery: Moderate atherosclerosis

- External Iliac Artery: Mild atherosclerosis

LEFT:

- Common Iliac Artery: Moderate atherosclerosis

- Internal Iliac Artery: Moderate atherosclerosis

- External Iliac Artery: Mild atherosclerosis



CTA LOWER EXTREMITIES:

RIGHT LOWER EXTREMITY:

- Common Femoral Artery: Moderate atherosclerosis

- Superficial Femoral Artery: Moderate to severe atherosclerosis

- Profunda Femoral Artery: Distal portion is not opacified.

- Popliteal Artery: Mild atherosclerosis

- Anterior Tibial Artery: Distal two thirds is not opacified.

- Tibioperoneal Trunk: Mild atherosclerosis

- Posterior Tibial Artery: Mild atherosclerosis

- Peroneal Artery: Minimal portion is poorly opacified.

- Ankle runoff: 2 vessel.



LEFT LOWER EXTREMITY:

- Common Femoral Artery: Distal portion is not opacified.

- Superficial Femoral Artery: Proximal portion is not opacified. There is moderate atherosclerotic di
sease.

- Profunda Femoral Artery: Mild atherosclerosis.

- Popliteal Artery: Not opacified

- Anterior Tibial Artery: Not opacified

- Tibioperoneal Trunk: Not opacified

- Posterior Tibial Artery: Not opacified

- Peroneal Artery: Not opacified

- Ankle runoff: 0 vessel.



NONTARGET STRUCTURES:

ABDOMEN/PELVIS:Post surgical change to the proximal colon. Peritoneal catheter terminates within the 
pelvis. Small amount of intra-abdominal and pelvic ascites. Hypoattenuating lesion within the spleen,
 likely small splenic cyst. Multiple renal cysts. Atrophic appearing kidneys.



LOWER EXTREMITIES:No significant abnormality.



SKELETAL: Findings of renal osteodystrophy. There is degeneration of the bilateral hips. There is als
o degeneration of the pubic symphysis. The right superior pubic ramus appears destroyed, which may al
so be secondary to renal osteodystrophy.

ADDITIONAL FINDINGS: None.



IMPRESSION:

1. The left lower extremity arteries are not opacified from the distal SFA to the ankle. Additional a
therosclerotic disease and poor opacification as detailed above.

2. Additional findings above.



Signer Name: Delvin Iverson DO 

Signed: 8/12/2022 6:22 PM

Workstation Name: [id number]

## 2022-08-12 NOTE — CONSULTATION
History of Present Illness





- Reason for Consult


Consult date: 08/12/22


Left Lower Extremity Ischemia


Requesting physician: CLEMENTE CALVIN





- History of Present Illness





Is a 72-year-old female with a history of end-stage renal disease who is on 

hemodialysis through a peritoneal dialysis catheter.  She presented to the 

emergency department with complaints of acute left lower extremity pain that she

says started at approximately 9 AM this morning.  She denies having any pain in 

her right lower extremity.  She states that the pain progressively worsened 

which prompted her to present to the emergency department.  The patient is work-

up included an arterial duplex which demonstrated monophasic flow throughout the

left lower extremity as well as monophasic flow in the right dorsalis pedis 

artery.  She eventually underwent a CTA that demonstrated emboli in bilateral 

lower extremities with a short segment occlusion in the right popliteal artery 

as well as thrombus noted within the mid peroneal artery and anterior tibial 

artery.  The posterior tibial artery appeared to be patent.  The left lower 

extremity demonstrated thrombus within the common femoral artery as well as the 

popliteal artery and multiple tibial vessels.  She complains of severe left leg 

pain throughout the left leg however she states that her left foot is numb.  

Prior to admission she states she ambulated without difficulty.  She has no 

additional complaints at this time.





Past History


Past Medical History: diabetes, dialysis (peritoneal), ESRD, heart failure, 

hypertension, hyperlipidemia


Past Surgical History: Other (Peritoneal dialysis catheter, resection of left 

renal tumor)


Social history: no significant social history


Family history: no significant family history





Medications and Allergies


                                    Allergies











Allergy/AdvReac Type Severity Reaction Status Date / Time


 


digoxin Allergy  Hives Verified 08/12/22 17:52


 


lisinopril Allergy  Swelling Verified 08/12/22 17:52


 


nitroglycerin AdvReac  Rash, Verified 08/12/22 17:52





   HEADACHES  


 


shellfish derived AdvReac  Swelling Verified 08/12/22 17:52











                                Home Medications











 Medication  Instructions  Recorded  Confirmed  Last Taken  Type


 


NIFEdipine XL [Procardia Xl] 60 mg PO HS 08/21/15 02/07/22 02/06/22 History


 


carvediloL [Coreg] 6.25 mg PO BID 08/21/15 02/07/22 02/06/22 History


 


cloNIDine [Catapres] 0.2 mg PO DAILY 12/09/16 02/07/22 02/06/22 History


 


Hydralazine HCl 50 mg PO TID 05/12/21 02/07/22 02/06/22 History


 


Doxycycline Monohydrate 100 mg PO BID #20 cap 02/07/22  Unknown Rx





[Doxycycline Monohydrate CAP]     











Active Meds: 


Active Medications





Acetaminophen (Acetaminophen 325 Mg Tab)  650 mg PO Q4H PRN


   PRN Reason: Pain MILD(1-3)/Fever >100.5/HA


Heparin Sodium (Porcine) (Heparin 10,000 Units/10 Ml Vial)  2,400 unit 40 

unit/kg (2400 unit) IV Q6H PRN


   PRN Reason: Anti-Xa Assay < 0.1 units/ml


Heparin Sodium/Sodium Chloride (Heparin/ 0.45% Nacl-25,000 Unit/500 Ml)  25,000 

unit in 500 mls @ 16 mls/hr IV TITR ANTONY; Protocol


   Last Admin: 08/12/22 18:35 Dose:  800 units/hr, 16 mls/hr


   


Ondansetron HCl (Ondansetron 4 Mg/2 Ml Inj)  4 mg IV Q8H PRN


   PRN Reason: Nausea And Vomiting


Sodium Chloride (Sodium Chloride 0.9% 10 Ml Flush Syringe)  10 ml IV BID ANTONY


Sodium Chloride (Sodium Chloride 0.9% 10 Ml Flush Syringe)  10 ml IV PRN PRN


   PRN Reason: LINE FLUSH











Review of Systems


All systems: negative





Exam





- Constitutional


Vitals: 


                                        











Temp Pulse Resp BP Pulse Ox


 


 97.9 F   105 H  34 H  220/102   98 


 


 08/12/22 12:54  08/12/22 19:15  08/12/22 19:15  08/12/22 19:15  08/12/22 19:15











General appearance: Present: no acute distress, other (complaining of severe lef

t leg pain)





- Respiratory


Respiratory effort: normal





- Cardiovascular


Rhythm: regular





- Extremities


Extremity abnormal: cold (Foot is cool), pulses diminished (Nonpalpable pedal 

pulses bilaterally, palpable right femoral pulse, nonpalpable left femoral 

pulse)





- Abdominal


General gastrointestinal: Present: soft, other (Peritoneal dialysis catheter in 

place)





- Rectal


Rectal Exam: deferred





- Neurologic


Neurologic: other (No sensation to left foot, diminished motor to left foot, 

right foot has intact motor and sensation)





Results





- Labs


CBC & Chem 7: 


                                 08/12/22 17:52





                                 08/12/22 17:51


Labs: 


                              Abnormal lab results











  08/12/22 08/12/22 Range/Units





  17:51 17:52 


 


RBC   3.38 L  (3.65-5.03)  M/mm3


 


MCV   99 H  (79-97)  fl


 


RDW   23.2 H  (13.2-15.2)  %


 


Lymph % (Auto)   8.6 L  (13.4-35.0)  %


 


Baso % (Auto)   2.1 H  (0.0-1.8)  %


 


Lymph # (Auto)   0.7 L  (1.2-5.4)  K/mm3


 


Baso # (Auto)   0.2 H  (0.0-0.1)  K/mm3


 


Seg Neutrophils %   80.0 H  (40.0-70.0)  %


 


Chloride  96.3 L   ()  mmol/L


 


BUN  48 H   (7-17)  mg/dL


 


Creatinine  10.1 H   (0.6-1.2)  mg/dL


 


Magnesium  1.60 L   (1.7-2.3)  mg/dL


 


Alkaline Phosphatase  176 H   ()  units/L


 


Troponin T  0.203 H*   (0.00-0.029)  ng/mL


 


Total Protein  6.2 L   (6.3-8.2)  g/dL


 


Albumin  3.4 L   (3.9-5)  g/dL














- Imaging and Cardiology


CT scan - abdomen: image reviewed


CT scan - pelvis: image reviewed





Assessment and Plan





The patient is a 72-year-old female who presented with left leg pain who states 

that when she initially arrived at the emergency department she was able to move

her foot.  She now continues to have pain with no motor or sensation.  The CTA 

with runoff demonstrates thrombus within multiple segments of the left lower 

extremity as well as the right lower extremity.  She has no evidence of right 

leg ischemia.  She has [place] 3 ischemia involving the left lower extremity.

 I am taking her emergently to the Cath Lab for percutaneous mechanical 

thrombectomy of the left leg.  I also discussed with her the need for possible 4

compartment fasciotomy of the left lower extremity.  The patient has expressed 

understanding of the plan and agrees.

## 2022-08-12 NOTE — EVENT NOTE
Date: 08/12/22





Patient with return of motor and sensation to the left foot although she still 

feels some numbness.  Her left calf was soft and nontender without any evidence 

to suggest compartment syndrome.  Elected not to perform prophylactic left leg 4

compartment fasciotomy.  We will continue to monitor and if she develops any 

evidence of compartment syndrome she will be taken to the operating room for a 4

compartment fasciotomy.

## 2022-08-12 NOTE — EMERGENCY DEPARTMENT REPORT
ED General Adult HPI





- General


Chief complaint: Extremity Injury, Lower


Stated complaint: LEFT LEG PAIN


Time Seen by Provider: 08/12/22 13:51


Source: patient, EMS ( EMS documentation not available at time of chart 

dictation ), RN notes reviewed, old records reviewed


Mode of arrival: Stretcher


Limitations: Physical Limitation





- History of Present Illness


Initial comments: 





The patient was evaluated in the emergency department for symptoms described in 

the history of present illness.  He/she was evaluated in the context of the 

global COVID-19 pandemic, which necessitated consideration that the patient 

might be at risk for infection with the virus that causes COVID-19.  

Institutional protocols and algorithms that pertain to the evaluation of patient

s at risk for COVID-19 are in a state of rapid change based on information 

released by regulatory bodies including the CDC and federal and state 

organizations.  These policies and algorithms were followed during the patient's

care in the emergency department.  Please note that these policies, procedures 

and recommendations changed on a rapid basis.





During the history and physical examination, I am chaperoned by nurse Mancera





Nephrology: Dr. Horton





Past medical history: End-stage renal disease on home peritoneal dialysis, 

diabetes, hypertension.  This is a 72-year-old female who presents to the 

department today with a complaint of nontraumatic left lower extremity pain, 

numbness, and feeling cool.  She currently denies headache, neck pain, chest 

pain, abdominal pain, and shortness of breath.  She denies nausea, vomiting and 

diarrhea.





She denies hematemesis and bright red blood per rectum.  Her symptoms started in

her left mid thigh, and radiated distally.  She thinks the symptoms started at 

9:00 AM.


-: Sudden, hour(s)


Location: left, lower extremity


Severity scale (0 -10): 8


Consistency: constant


Improves with: cold therapy


Worsens with: movement





- Related Data


                                Home Medications











 Medication  Instructions  Recorded  Confirmed  Last Taken


 


NIFEdipine XL [Procardia Xl] 60 mg PO HS 08/21/15 02/07/22 02/06/22


 


carvediloL [Coreg] 6.25 mg PO BID 08/21/15 02/07/22 02/06/22


 


cloNIDine [Catapres] 0.2 mg PO DAILY 12/09/16 02/07/22 02/06/22


 


Hydralazine HCl 50 mg PO TID 05/12/21 02/07/22 02/06/22








                                  Previous Rx's











 Medication  Instructions  Recorded  Last Taken  Type


 


Doxycycline Monohydrate 100 mg PO BID #20 cap 02/07/22 Unknown Rx





[Doxycycline Monohydrate CAP]    











                                    Allergies











Allergy/AdvReac Type Severity Reaction Status Date / Time


 


digoxin Allergy  Hives Verified 08/12/22 17:52


 


lisinopril Allergy  Swelling Verified 08/12/22 17:52


 


nitroglycerin AdvReac  Rash, Verified 08/12/22 17:52





   HEADACHES  


 


shellfish derived AdvReac  Swelling Verified 08/12/22 17:52














ED Review of Systems


ROS: 


Stated complaint: LEFT LEG PAIN


Other details as noted in HPI





Comment: All other systems reviewed and negative


Constitutional: denies: fever


Cardiovascular: denies: chest pain


Gastrointestinal: denies: abdominal pain, hematemesis, melena, hematochezia


Musculoskeletal: myalgia.  denies: back pain


Neurological: weakness, numbness, paresthesias





ED Past Medical Hx





- Past Medical History


Hx Hypertension: Yes (FOR 10+ YRS, DR. ABAD- PCP)


Hx Congestive Heart Failure: Yes (IN 2007)


Hx Diabetes: Yes


Hx Liver Disease: No


Hx Renal Disease: Yes (CKD STAGE 5, DR. HORTON- NEPHROLOGIST)


Hx Sickle Cell Disease: No


Hx Arthritis: Yes (Gout)


Hx Seizures: No


Hx Asthma: Yes (AS A CHILD)


Hx HIV: No


Additional medical history: Dilated cardiomyopathy / PERITONEAL DYALISIS





- Surgical History


Hx Pacemaker: No


Hx Appendectomy: Yes (IN 1985)


Additional Surgical History: tonsil removed





- Social History


Smoking Status: Never Smoker


Substance Use Type: None





- Medications


Home Medications: 


                                Home Medications











 Medication  Instructions  Recorded  Confirmed  Last Taken  Type


 


NIFEdipine XL [Procardia Xl] 60 mg PO HS 08/21/15 02/07/22 02/06/22 History


 


carvediloL [Coreg] 6.25 mg PO BID 08/21/15 02/07/22 02/06/22 History


 


cloNIDine [Catapres] 0.2 mg PO DAILY 12/09/16 02/07/22 02/06/22 History


 


Hydralazine HCl 50 mg PO TID 05/12/21 02/07/22 02/06/22 History


 


Doxycycline Monohydrate 100 mg PO BID #20 cap 02/07/22  Unknown Rx





[Doxycycline Monohydrate CAP]     














ED Physical Exam





- General


Limitations: Physical Limitation


General appearance: alert, anxious





- Head


Head exam: Present: atraumatic, normocephalic





- Eye


Eye exam: Present: normal appearance, EOMI.  Absent: nystagmus





- ENT


ENT exam: Present: normal exam, normal orophraynx, mucous membranes moist, 

normal external ear exam





- Neck


Neck exam: Present: normal inspection, full ROM.  Absent: tenderness, 

meningismus





- Respiratory


Respiratory exam: Present: normal lung sounds bilaterally.  Absent: respiratory 

distress, wheezes, rales, rhonchi, stridor, decreased breath sounds





- Cardiovascular


Cardiovascular Exam: Present: regular rate, normal rhythm, normal heart sounds. 

Absent: bradycardia, tachycardia, irregular rhythm, systolic murmur, diastolic 

murmur, rubs, gallop





- GI/Abdominal


GI/Abdominal exam: Present: soft, other (There is a left lower quadrant 

peritoneal dialysis access catheter, without redness, pus or streaking).  

Absent: distended, tenderness, guarding, pulsatile mass





- Extremities Exam


Extremities exam: Present: normal inspection, full ROM (Right arm, right leg, 

left arm), other (2+ radial pulses bilaterally.  2+ femoral pulse on the 

right.).  Absent: calf tenderness





- Back Exam


Back exam: Present: normal inspection.  Absent: tenderness, CVA tenderness (R), 

CVA tenderness (L), paraspinal tenderness, vertebral tenderness





- Neurological Exam


Neurological exam: Present: alert, oriented X3, other (There is no facial droop.

 The tongue is midline.  EOMI.  5 out of 5 strength bilateral upper extremities 

and right lower extremity.  Sensation is intact to light touch in the left lower

extremity)





- Psychiatric


Psychiatric exam: Present: anxious





- Skin


Skin exam: Present: warm, dry, intact, normal color.  Absent: rash





- Other


Other exam information: 











The left lower extremity is cool.  There is a circumferential wounds noted on 

the left medial distal lower extremity.





Unable to appreciate dorsalis pedis pulse in the left lower extremity.  Left 

femoral pulses are thready.





There is no pain with passive range of motion of the great toe or feet.





4 out of 5 strength left hip certain extensor, left knee flexor and extensor, 

left foot dorsi and plantar flexion





ED Course


                                   Vital Signs











  08/12/22 08/12/22





  12:54 18:47


 


Temperature 97.9 F 


 


Pulse Rate 88 93 H


 


Respiratory 16 





Rate  


 


Blood Pressure  214/106


 


Blood Pressure 210/98 





[Left]  


 


O2 Sat by Pulse 98 





Oximetry  














- Reevaluation(s)


Reevaluation #1: 





08/12/22 17:05


Differential diagnosis, including but not limited to: Acute arterial 

insufficiency, dissection, DVT, neuropathy











Assessment and plan: 72-year-old female presenting with left lower extremity 

pain, numbness, weakness, and feeling cool, with a pulse disparity, and 

temperature disparity between her lower extremities, concerning for arterial 

insufficiency.





I appreciate that this patient is on dialysis, and have recommended emergent CT 

angiogram abdomen pelvis, with lower extremity runoff, to evaluate for acute 

limb ischemia.  Patient has consented to this.





We are also obtaining lower extremity DVT and arterial duplex to evaluate her 

anatomy.  Contacted covering nephrology, Dr. Powell.  His group will follow in 

consultation.  Currently awaiting laboratory studies, ultrasound, and CT lower 

extremity.  Have instructed team that patient needs to go to CAT scan 

expediently for acquisition of appropriate diagnostics.


08/12/22 18:34


cta confirms left-sided arterial disease.  Dr. Ramos updated, and advises that 

he has seen the CAT scan.  He recommends admission to the medical service, and 

indicates vascular surgery will follow in consultation.  Indicates the patient 

does not require emergent catheterization lab or vascular surgery procedure at 

this time


08/12/22 19:01


Dr JENNA Harvey to admit to IMS








Elevated troponin is likely a type II troponin leak.


08/12/22 19:09


Patient updated on CT scan findings.  She articulates understanding.  She is 

still having persistent pain in her left lower extremity.  Additional pain medic

ation ordered.





- Consultations


Consultation #1: 





08/12/22 17:42


Discussed the patient's history, physical, ultrasound findings and clinical 

impression with vascular surgeon Dr. Ramos.  He indicates he will be by to 

evaluate and examine the patient shortly.  Recommends heparin drip which has 

been ordered





- EJ/Peripheral Line


  ** Arm R


Time Out Performed: Yes


Indications: multiple IV sites needed


Skin Cleansed in Sterile Fashion: Yes


Size: 20


Dressing Placed: Tegaderm


Patient Tolerated Procedure: well





ED Medical Decision Making





- Lab Data


Result diagrams: 


                                 08/12/22 17:52





                                 08/12/22 17:51








                                   Vital Signs











  08/12/22





  12:54


 


Temperature 97.9 F


 


Pulse Rate 88


 


Respiratory 16





Rate 


 


Blood Pressure 210/98





[Left] 


 


O2 Sat by Pulse 98





Oximetry 











                                   Lab Results











  08/12/22 08/12/22 08/12/22 Range/Units





  17:51 17:51 17:52 


 


WBC     (4.5-11.0)  K/mm3


 


RBC     (3.65-5.03)  M/mm3


 


Hgb     (10.1-14.3)  gm/dl


 


Hct     (30.3-42.9)  %


 


MCV     (79-97)  fl


 


MCH     (28-32)  pg


 


MCHC     (30-34)  %


 


RDW     (13.2-15.2)  %


 


Plt Count     (140-440)  K/mm3


 


Lymph % (Auto)     (13.4-35.0)  %


 


Mono % (Auto)     (0.0-7.3)  %


 


Eos % (Auto)     (0.0-4.3)  %


 


Baso % (Auto)     (0.0-1.8)  %


 


Lymph # (Auto)     (1.2-5.4)  K/mm3


 


Mono # (Auto)     (0.0-0.8)  K/mm3


 


Eos # (Auto)     (0.0-0.4)  K/mm3


 


Baso # (Auto)     (0.0-0.1)  K/mm3


 


Seg Neutrophils %     (40.0-70.0)  %


 


Seg Neutrophils #     (1.8-7.7)  K/mm3


 


PT  13.1    (12.2-14.9)  Sec.


 


INR  0.90    (0.87-1.13)  


 


APTT  29.2    (24.2-36.6)  Sec.


 


Sodium   141   (137-145)  mmol/L


 


Potassium   3.7   (3.6-5.0)  mmol/L


 


Chloride   96.3 L   ()  mmol/L


 


Carbon Dioxide   25   (22-30)  mmol/L


 


Anion Gap   23   mmol/L


 


BUN   48 H   (7-17)  mg/dL


 


Creatinine   10.1 H   (0.6-1.2)  mg/dL


 


Estimated GFR   5   ml/min


 


BUN/Creatinine Ratio   5   %


 


Glucose   91   ()  mg/dL


 


Lactic Acid    0.80  (0.7-2.0)  mmol/L


 


Calcium   8.8   (8.4-10.2)  mg/dL


 


Magnesium   1.60 L   (1.7-2.3)  mg/dL


 


Total Bilirubin   0.40   (0.1-1.2)  mg/dL


 


AST   14   (5-40)  units/L


 


ALT   13   (7-56)  units/L


 


Alkaline Phosphatase   176 H   ()  units/L


 


Total Creatine Kinase   99   ()  units/L


 


Troponin T   0.203 H*   (0.00-0.029)  ng/mL


 


Total Protein   6.2 L   (6.3-8.2)  g/dL


 


Albumin   3.4 L   (3.9-5)  g/dL


 


Albumin/Globulin Ratio   1.2   %














  08/12/22 Range/Units





  17:52 


 


WBC  8.7  (4.5-11.0)  K/mm3


 


RBC  3.38 L  (3.65-5.03)  M/mm3


 


Hgb  10.3  (10.1-14.3)  gm/dl


 


Hct  33.5  (30.3-42.9)  %


 


MCV  99 H  (79-97)  fl


 


MCH  31  (28-32)  pg


 


MCHC  31  (30-34)  %


 


RDW  23.2 H  (13.2-15.2)  %


 


Plt Count  274  (140-440)  K/mm3


 


Lymph % (Auto)  8.6 L  (13.4-35.0)  %


 


Mono % (Auto)  5.3  (0.0-7.3)  %


 


Eos % (Auto)  4.0  (0.0-4.3)  %


 


Baso % (Auto)  2.1 H  (0.0-1.8)  %


 


Lymph # (Auto)  0.7 L  (1.2-5.4)  K/mm3


 


Mono # (Auto)  0.5  (0.0-0.8)  K/mm3


 


Eos # (Auto)  0.3  (0.0-0.4)  K/mm3


 


Baso # (Auto)  0.2 H  (0.0-0.1)  K/mm3


 


Seg Neutrophils %  80.0 H  (40.0-70.0)  %


 


Seg Neutrophils #  7.0  (1.8-7.7)  K/mm3


 


PT   (12.2-14.9)  Sec.


 


INR   (0.87-1.13)  


 


APTT   (24.2-36.6)  Sec.


 


Sodium   (137-145)  mmol/L


 


Potassium   (3.6-5.0)  mmol/L


 


Chloride   ()  mmol/L


 


Carbon Dioxide   (22-30)  mmol/L


 


Anion Gap   mmol/L


 


BUN   (7-17)  mg/dL


 


Creatinine   (0.6-1.2)  mg/dL


 


Estimated GFR   ml/min


 


BUN/Creatinine Ratio   %


 


Glucose   ()  mg/dL


 


Lactic Acid   (0.7-2.0)  mmol/L


 


Calcium   (8.4-10.2)  mg/dL


 


Magnesium   (1.7-2.3)  mg/dL


 


Total Bilirubin   (0.1-1.2)  mg/dL


 


AST   (5-40)  units/L


 


ALT   (7-56)  units/L


 


Alkaline Phosphatase   ()  units/L


 


Total Creatine Kinase   ()  units/L


 


Troponin T   (0.00-0.029)  ng/mL


 


Total Protein   (6.3-8.2)  g/dL


 


Albumin   (3.9-5)  g/dL


 


Albumin/Globulin Ratio   %














- Radiology Data


Radiology results: report reviewed, image reviewed





DUPLEX DOPPLER LOWER EXTREMITY ARTERIAL, LEFT  INDICATION / CLINICAL IN

FORMATION: Left lower extremity arterial insufficiency.  TECHNIQUE: Arterial 

duplex examination of the left lower extremity performed using B-mode, color 

flow and spectral Doppler assessment.  FINDINGS:  LEFT: Common Femoral Artery: 

PSV 66 cm/sec. Monophasic waveform. Proximal SFA: PSV 42 cm/sec. Monophasic 

waveform. Mid SFA: PSV 14 cm/sec. Monophasic waveform. Distal SFA: PSV No flow 

identified.. Popliteal Artery: No flow identified. Posterior Tibial Artery: No 

flow identified. Dorsalis Pedis Artery: No flow identified. .  ADDITIONAL 

FINDINGS: None.  LEFT ENRIKE: Not calculated  IMPRESSION: 1. Marked peripheral 

artery disease of the left extremity with absent flow noted from the distal 

superficial femoral artery through the lower ankle arteries. This was 

communicated to Dr. Kelly at 1710.   ====================== Ankle-Brachial 

Index (ENRIKE): ====================== - Calcified arteries > 1.4 - Normal = 0.9-

1.4 - Mild PAD = 0.7-0.89 - Moderate PAD = 0.51-0.69 - Severe PAD < 0.5  

====================== Doppler Waveform: ====================== - Triphasic is 

normal. - Biphasic is abnormal if clear transition from triphasic signal along 

vascular tree. - Monophasic is abnormal.  Signer Name: Delvin Iverson DO 

Signed: 8/12/2022 4:30 PM Workstation Name: [id number]








DUPLEX DOPPLER LOWER EXTREMITY VEINS, LEFT  INDICATION / CLINICAL INFORMATION: 

Acute left lower extremity pain..  TECHNIQUE: Duplex doppler imaging was 

performed through the veins of the left lower extremity using venous compression

 and other maneuvers.  COMPARISON: None available.  FINDINGS:  LEFT COMMON FEMO

RAL VEIN: Negative. LEFT FEMORAL VEIN: Negative. LEFT POPLITEAL VEIN: Negative. 

LEFT CALF VEINS: Negative.  ADDITIONAL FINDINGS: None.  IMPRESSION: 1. No 

sonographic evidence for DVT in the left lower extremity.  Signer Name: Darren Rose MD Signed: 8/12/2022 4:23 PM Workstation Name: [id number]














CTA ABDOMEN, PELVIS, AND LOWER EXTREMITIES WITH CONTRAST  INDICATION / CLINICAL 

INFORMATION: Acute left leg pain, weakness, arterial insufficie 100ml of 

[id number].  TECHNIQUE: Axial CT images were obtained through the abdomen, pelvis 

and lower extremities after injection of 100 cc Omnipaque 350 IV contrast. 3 

plane MIP / 3D reconstructions were produced. All CT scans at this location are 

performed using CT dose reduction for ALARA by means of automated exposure 

control.  








COMPARISON: 2/7/2022  FINDINGS: CTA ABDOMEN: Abdominal Aorta: Moderate 

atherosclerosis Celiac Artery: 





No significant abnormality. 





Superior Mesenteric Artery: No significant abnormality. 





Right Renal Artery: Mild atherosclerosis 





Left Renal Artery: Moderate to severe atherosclerosis 





Inferior Mesenteric Artery: No significant abnormality.  





CTA PELVIS: RIGHT: - Common Iliac Artery: Moderate atherosclerosis - Internal 

Iliac Artery: Moderate atherosclerosis - External Iliac Artery: Mild 

atherosclerosis 





LEFT: - Common Iliac Artery: Moderate atherosclerosis - Internal Iliac Artery: 

Moderate atherosclerosis - External Iliac Artery: Mild atherosclerosis  CTA 








LOWER EXTREMITIES: RIGHT LOWER EXTREMITY: - Common Femoral Artery: Moderate 

atherosclerosis - 





Superficial Femoral Artery: Moderate to severe atherosclerosis - Profunda 

Femoral Artery: Distal portion is not opacified. - Popliteal Artery: Mild athero

sclerosis - Anterior Tibial Artery: Distal two thirds is not opacified. - 

Tibioperoneal Trunk: Mild atherosclerosis - Posterior Tibial Artery: Mild 

atherosclerosis - Peroneal Artery: Minimal portion is poorly opacified. - Ankle 

runoff: 2 vessel. 








 LEFT LOWER EXTREMITY: - Common Femoral Artery: Distal portion is not opacified.

 - Superficial Femoral Artery: Proximal portion is not opacified. There is 

moderate atherosclerotic disease. - Profunda Femoral Artery: Mild 

atherosclerosis. - Popliteal Artery: Not opacified - Anterior Tibial Artery: Not

 opacified - Tibioperoneal Trunk: Not opacified - Posterior Tibial Artery: Not 

opacified - Peroneal Artery: Not opacified - Ankle runoff: 0 vessel.  NONTARGET 








STRUCTURES: ABDOMEN/PELVIS:Post surgical change to the proximal colon. 

Peritoneal catheter terminates within the pelvis. Small amount of intra-

abdominal and pelvic ascites. Hypoattenuating lesion within the spleen, likely 

small splenic cyst. Multiple renal cysts. Atrophic appearing kidneys.  LOWER 

EXTREMITIES:No significant abnormality.  SKELETAL: Findings of renal 

osteodystrophy. There is degeneration of the bilateral hips. There is also 

degeneration of the pubic symphysis. The right superior pubic ramus appears 

destroyed, which may also be secondary to renal osteodystrophy. ADDITIONAL 

FINDINGS: None. 














 IMPRESSION: 1. The left lower extremity arteries are not opacified from the 

distal SFA to the ankle. Additional atherosclerotic disease and poor 

opacification as detailed above. 2. Additional findings above.  Signer Name: 

Delvin Iverson DO Signed: 8/12/2022 5:22 PM Workstation Name: [id number]

[id number]


Critical Care Time: Yes


Critical care time in (mins) excluding proc time.: 45


Critical care attestation.: 


If time is entered above; I have spent that time in minutes in the direct care 

of this critically ill patient, excluding procedure time.








ED Disposition


Clinical Impression: 


 Acute pain of left lower extremity, Acute occlusion of artery of lower 

extremity, End stage renal disease, Hypertension, Hypomagnesemia





Disposition: 09 ADMITTED AS INPATIENT


Is pt being admited?: Yes


Does the pt Need Aspirin: No


Condition: Serious


Instructions:  Hypertension (ED)


Referrals: 


PRIMARY CARE,MD [Primary Care Provider] - 3-5 Days

## 2022-08-13 LAB
BASOPHILS # (AUTO): 0.1 K/MM3 (ref 0–0.1)
BASOPHILS NFR BLD AUTO: 1.2 % (ref 0–1.8)
EOSINOPHIL # BLD AUTO: 0.5 K/MM3 (ref 0–0.4)
EOSINOPHIL NFR BLD AUTO: 6.9 % (ref 0–4.3)
HCT VFR BLD CALC: 28.1 % (ref 30.3–42.9)
HDLC SERPL-MCNC: 40 MG/DL (ref 40–59)
HGB BLD-MCNC: 8.7 GM/DL (ref 10.1–14.3)
LYMPHOCYTES # BLD AUTO: 1.1 K/MM3 (ref 1.2–5.4)
LYMPHOCYTES NFR BLD AUTO: 14.9 % (ref 13.4–35)
MCHC RBC AUTO-ENTMCNC: 31 % (ref 30–34)
MCV RBC AUTO: 98 FL (ref 79–97)
MONOCYTES # (AUTO): 0.7 K/MM3 (ref 0–0.8)
MONOCYTES % (AUTO): 9.6 % (ref 0–7.3)
PLATELET # BLD: 226 K/MM3 (ref 140–440)
RBC # BLD AUTO: 2.87 M/MM3 (ref 3.65–5.03)

## 2022-08-13 PROCEDURE — 3E1M39Z IRRIGATION OF PERITONEAL CAVITY USING DIALYSATE, PERCUTANEOUS APPROACH: ICD-10-PCS | Performed by: SURGERY

## 2022-08-13 RX ADMIN — Medication SCH ML: at 22:09

## 2022-08-13 RX ADMIN — CLOPIDOGREL BISULFATE SCH MG: 75 TABLET ORAL at 09:07

## 2022-08-13 RX ADMIN — MORPHINE SULFATE PRN MG: 4 INJECTION, SOLUTION INTRAMUSCULAR; INTRAVENOUS at 01:58

## 2022-08-13 RX ADMIN — MORPHINE SULFATE PRN MG: 4 INJECTION, SOLUTION INTRAMUSCULAR; INTRAVENOUS at 06:27

## 2022-08-13 RX ADMIN — SODIUM CHLORIDE, SODIUM LACTATE, CALCIUM CHLORIDE, MAGNESIUM CHLORIDE AND DEXTROSE SCH: 1.5; 538; 448; 25.7; 5.08 INJECTION, SOLUTION INTRAPERITONEAL at 06:55

## 2022-08-13 RX ADMIN — Medication SCH ML: at 09:06

## 2022-08-13 RX ADMIN — SODIUM CHLORIDE, SODIUM LACTATE, CALCIUM CHLORIDE, MAGNESIUM CHLORIDE AND DEXTROSE SCH ML: 1.5; 538; 448; 25.7; 5.08 INJECTION, SOLUTION INTRAPERITONEAL at 12:07

## 2022-08-13 RX ADMIN — SODIUM CHLORIDE, SODIUM LACTATE, CALCIUM CHLORIDE, MAGNESIUM CHLORIDE AND DEXTROSE SCH ML: 1.5; 538; 448; 25.7; 5.08 INJECTION, SOLUTION INTRAPERITONEAL at 06:38

## 2022-08-13 RX ADMIN — SODIUM CHLORIDE, SODIUM LACTATE, CALCIUM CHLORIDE, MAGNESIUM CHLORIDE AND DEXTROSE SCH: 1.5; 538; 448; 25.7; 5.08 INJECTION, SOLUTION INTRAPERITONEAL at 18:05

## 2022-08-13 RX ADMIN — NIFEDIPINE SCH: 60 TABLET, EXTENDED RELEASE ORAL at 22:09

## 2022-08-13 NOTE — PROGRESS NOTE
Assessment and Plan


Assessment and plan: 





History of present illness: 


72-year-old female with history of end-stage renal disease on peritoneal 

dialysis, hypertension presents with left lower extremity pain numbness and s

ignificant difficulty in walking.  Her symptoms are confined to the left mid 

thigh and call and left foot.  Patient says that her symptoms started around 9 

AM.


Emergent  arterial duplex scan revealed---Marked peripheral artery disease of 

the left extremity with absent flow noted from the distal superficial femoral 

artery through the lower ankle arthritis.  Vascular surgery Was Informed and the

Patient Was Taken to the Cath Lab for immediate intervention.





Hospital Course:


: No acute complaints this AM. States that LLE symptomology significantly 

improved, no pain/numbness/paresthesias reported today. good dp and pt pulses 

appreciated.





(1) Acute occlusion of artery of lower extremity


Current Visit: Yes   Status: Acute   


Plan to address problem: 


Patient taken to the Cath Lab for intervention and possible stent placement


Patient was started on IV heparin


Admitted to [facility]








(2) Acute pain of left lower extremity


Current Visit: Yes   Status: Acute   


Plan to address problem: 


Emergent vascular surgery intervention








(3) End stage renal disease


Current Visit: Yes   Status: Chronic   


Plan to address problem: 


Nephrology consulted


Dialysis as per schedule








(4) Hypertension


Current Visit: Yes   Status: Chronic   


Qualifiers: 


   Hypertension type: primary hypertension 


Plan to address problem: 


Continue antihypertensives








(5) Dilated cardiomyopathy


Current Visit: Yes   Status: Chronic   


Plan to address problem: 


Increase ultrafiltration to prevent volume overload








(6) DVT prophylaxis


Current Visit: Yes   Status: Acute   


Plan to address problem: 


Patient on IV heparin and GI prophylaxis








(7) Advance care planning


Current Visit: Yes   Status: Acute   


Plan to address problem: 


Disease education conducted, care plan discussed, below diagnosis discussed and 

prognosis discussed.  Patient acknowledged understanding with care plan.  +30 

minutes.





Critical care statement


The high probability OF a clinically significant sudden or life-threatening 

deterioration of the cardiorespiratory system and endocrine system required my 

full and direct attention, intervention and postoperative management.  The 

aggregate critical care time was 40 minutes.  The time is in addition to time 

spent performing reported procedures but includes the followin: Data review and interpretation


2: Patient assessment and monitoring of vital signs


3: Documentation


4:: Medication orders and management


Advance Directives: Yes





History


Interval history: 





No acute complaints this AM.





Hospitalist Physical





- Physical exam


Narrative exam: 





General appearance: Present: no acute distress, well-nourished





- EENT


Eyes: Present: PERRL


ENT: hearing intact, clear oral mucosa





- Neck


Neck: Present: supple, normal ROM





- Respiratory


Respiratory effort: normal


Respiratory: bilateral: CTA





- Cardiovascular


Heart rate: 78


Rhythm: regular


Heart Sounds: Present: S1 & S2.  Absent: rub, click





- Extremities


Extremities: No edema, abnormal (Left lower extremity cold to touch, but no 

gangrenous changes)--> improved now warm to touch


Extremity abnormal: pulses palpable in DP and PT other (Same as overall)


Peripheral Pulses: within normal limits





- Abdominal


General gastrointestinal: Present: soft, non-tender, non-distended, normal bowel

sounds


Female genitourinary: Present: normal





- Integumentary


Integumentary: Present: clear, warm, dry





- Musculoskeletal


Musculoskeletal: gait normal, strength equal bilaterally





- Psychiatric


Psychiatric: appropriate mood/affect, intact judgment & insight





- Neurologic


Neurologic: CNII-XII intact, moves all extremities





- Constitutional


Vitals: 


                                        











Temp Pulse Resp BP Pulse Ox


 


 99.4 F   67   18   135/56   94 


 


 22 12:00  22 12:01  22 12:01  22 12:01  22 12:01











General appearance: Present: no acute distress, well-nourished





HEART Score





- HEART Score


Age: > 65


Risk factors: > 3 risk factors or hx of atherosclerotic disease


Troponin: 


                                        











Troponin T  0.203 ng/mL (0.00-0.029)  H*  22  17:51    











Troponin: < normal limit





- Critical Actions


Critical Actions: 4-6 pts:12-16.6% risk of adverse cardiac event. Should be 

admitted





Results





- Labs


CBC & Chem 7: 


                                 22 08:45





                                 22 17:51


Labs: 


                             Laboratory Last Values











WBC  7.7 K/mm3 (4.5-11.0)   22  08:45    


 


RBC  2.87 M/mm3 (3.65-5.03)  L  22  08:45    


 


Hgb  8.7 gm/dl (10.1-14.3)  L  22  08:45    


 


Hct  28.1 % (30.3-42.9)  L  22  08:45    


 


MCV  98 fl (79-97)  H  22  08:45    


 


MCH  30 pg (28-32)   22  08:45    


 


MCHC  31 % (30-34)   22  08:45    


 


RDW  23.8 % (13.2-15.2)  H  22  08:45    


 


Plt Count  226 K/mm3 (140-440)   22  08:45    


 


Lymph % (Auto)  14.9 % (13.4-35.0)   22  08:45    


 


Mono % (Auto)  9.6 % (0.0-7.3)  H  22  08:45    


 


Eos % (Auto)  6.9 % (0.0-4.3)  H  22  08:45    


 


Baso % (Auto)  1.2 % (0.0-1.8)   22  08:45    


 


Lymph # (Auto)  1.1 K/mm3 (1.2-5.4)  L  22  08:45    


 


Mono # (Auto)  0.7 K/mm3 (0.0-0.8)   22  08:45    


 


Eos # (Auto)  0.5 K/mm3 (0.0-0.4)  H  22  08:45    


 


Baso # (Auto)  0.1 K/mm3 (0.0-0.1)   22  08:45    


 


Seg Neutrophils %  67.4 % (40.0-70.0)   22  08:45    


 


Seg Neutrophils #  5.2 K/mm3 (1.8-7.7)   22  08:45    


 


PT  13.1 Sec. (12.2-14.9)   22  17:51    


 


INR  0.90  (0.87-1.13)   22  17:51    


 


APTT  29.2 Sec. (24.2-36.6)   22  17:51    


 


Heparin Anti-Xa Level  0.62 U.I./ml (0.3-0.7)   22  05:12    


 


Sodium  141 mmol/L (137-145)   22  17:51    


 


Potassium  3.7 mmol/L (3.6-5.0)   22  17:51    


 


Chloride  96.3 mmol/L ()  L  22  17:51    


 


Carbon Dioxide  25 mmol/L (22-30)   22  17:51    


 


Anion Gap  23 mmol/L  22  17:51    


 


BUN  48 mg/dL (7-17)  H  22  17:51    


 


Creatinine  10.1 mg/dL (0.6-1.2)  H  22  17:51    


 


Estimated GFR  5 ml/min  22  17:51    


 


BUN/Creatinine Ratio  5 %  22  17:51    


 


Glucose  91 mg/dL ()   22  17:51    


 


Lactic Acid  0.80 mmol/L (0.7-2.0)   22  17:52    


 


Calcium  8.8 mg/dL (8.4-10.2)   22  17:51    


 


Magnesium  1.60 mg/dL (1.7-2.3)  L  22  17:51    


 


Total Bilirubin  0.40 mg/dL (0.1-1.2)   22  17:51    


 


AST  14 units/L (5-40)   22  17:51    


 


ALT  13 units/L (7-56)   22  17:51    


 


Alkaline Phosphatase  176 units/L ()  H  22  17:51    


 


Total Creatine Kinase  99 units/L ()   22  17:51    


 


Troponin T  0.203 ng/mL (0.00-0.029)  H*  22  17:51    


 


Total Protein  6.2 g/dL (6.3-8.2)  L  22  17:51    


 


Albumin  3.4 g/dL (3.9-5)  L  22  17:51    


 


Albumin/Globulin Ratio  1.2 %  22  17:51    


 


Triglycerides  154 mg/dL (2-149)  H  22  17:51    


 


Cholesterol  131 mg/dL ()   22  17:51    


 


LDL Cholesterol Direct  59 mg/dL ()   22  17:51    


 


HDL Cholesterol  40 mg/dL (40-59)   22  17:51    


 


Cholesterol/HDL Ratio  3.27 %  22  17:51    














Active Medications





- Current Medications


Current Medications: 














Generic Name Dose Route Start Last Admin





  Trade Name Freq  PRN Reason Stop Dose Admin


 


Acetaminophen  650 mg  22 21:55 





  Acetaminophen 325 Mg Tab  PO  





  Q4H PRN  





  Pain MILD(1-3)/Fever >100.5/HA  


 


Hydrocodone Bitart/Acetaminophen  1 each  22 21:02 





  Hydrocodone/Acetaminophen 5-325 Mg Tab  PO  





  Q4H PRN  





  Pain, Moderate (4-6)  


 


Carvedilol  6.25 mg  22 22:00  22 09:07





  Carvedilol 6.25 Mg Tab  PO   6.25 mg





  BID ANTONY   Administration


 


Clonidine HCl  0.2 mg  22 10:00  22 09:07





  Clonidine 0.2 Mg Tab  PO   0.2 mg





  DAILY ANTONY   Administration


 


Clopidogrel Bisulfate  75 mg  22 10:00  22 09:07





  Clopidogrel 75 Mg Tab  PO   75 mg





  QDAY ANTONY   Administration


 


Heparin Sodium (Porcine)  2,400 unit  22 17:37 





  Heparin 10,000 Units/10 Ml Vial  40 unit/kg (2400 unit)  





  IV  





  Q6H PRN  





  Anti-Xa Assay < 0.1 units/ml  


 


Hydralazine HCl  50 mg  22 08:00  22 09:07





  Hydralazine 25 Mg Tab  PO   50 mg





  TID ANTONY   Administration


 


Heparin Sodium/Sodium Chloride  25,000 unit in 500 mls @ 16 mls/hr  22 1

8:00  22 07:08





  Heparin/ 0.45% Nacl-25,000 Unit/500 Ml  IV   800 units/hr





  TITR ANTONY   16 mls/hr





    Titration





  Protocol  





  800 UNITS/HR  


 


Metoclopramide HCl  2.5 mg  22 22:05 





  Metoclopramide 10 Mg/2 Ml Inj  IV  





  Q6H PRN  





  Nausea And Vomiting  


 


Morphine Sulfate  2 mg  22 21:55 





  Morphine 2 Mg/1 Ml Inj  IV  





  Q4H PRN  





  Pain, Moderate (4-6)  


 


Morphine Sulfate  4 mg  22 21:55  22 06:27





  Morphine 4 Mg/1 Ml Inj  IV   4 mg





  Q4H PRN   Administration





  Pain , Severe (7-10)  


 


Nifedipine  60 mg  22 22:00  22 22:59





  Nifedipine Xl 60 Mg Tab  PO   60 mg





  HS ANTONY   Administration


 


Ondansetron HCl  4 mg  22 21:55 





  Ondansetron 4 Mg/2 Ml Inj  IV  





  Q8H PRN  





  Nausea And Vomiting  


 


Oxycodone/Acetaminophen  1 tab  22 21:55 





  Oxycodone /Acetaminophen 5-325mg Tab  PO  





  Q6H PRN  





  Pain, Moderate (4-6)  


 


Peritoneal Dialysis Solution  2,000 ml  22 00:00  22 12:07





  Dialysate Pd2 1.5% Soln 2000 Ml  IP   2,000 ml





  Q6HR ANTONY   Administration


 


Sodium Chloride  10 ml  22 22:00  22 09:06





  Sodium Chloride 0.9% 10 Ml Flush Syringe  IV   10 ml





  BID ANTONY   Administration


 


Sodium Chloride  10 ml  22 21:55  22 06:29





  Sodium Chloride 0.9% 10 Ml Flush Syringe  IV   10 ml





  PRN PRN   Administration





  LINE FLUSH  














Nutrition/Malnutrition Assess





- Dietary Evaluation


Nutrition/Malnutrition Findings: 


                                        





Nutrition Notes                                            Start:  22 

10:07


Freq:                                                      Status: Active       




Protocol:                                                                       




 Document     22 10:07  LAINEY  (Rec: 22 10:23  NHALL  [id number])


 Nutrition Notes


     Need for Assessment generated from:         MD Order,RN Referral,MST


     Initial or Follow up                        Assessment


     Current Diagnosis                           CKD (stage V CKD),Hypertension


                                                 ,Heart Failure


     Other Pertinent Diagnosis                   LLE pain s/p thrombectomy of (


                                                 L) leg


     Current Diet                                Renal


     Labs/Tests                                  Reviewed


     Pertinent Medications                       Heparin gtt


     Height                                      5 ft 7 in


     Weight                                      58.967 kg


     Ideal Body Weight (kg)                      61.36


     BMI                                         20.3


     Weight Status                               Underweight


     Subjective/Other Information                RD consulted for ONS; pt also


                                                 screened for malnutrition risk


                                                 .  Pt reported significant


                                                 difficulty in walking upon


                                                 admission.  Arterial duplex


                                                 scan revealed marked


                                                 peripheral artery disease of


                                                 LLE with absent flow noted


                                                 from distal femoral artery


                                                 through the lower ankle


                                                 arteries.  Pt having PD this


                                                 am.


     Burn                                        Absent


     Trauma                                      Absent


     Skin Integrity/Comment                      "Spider bite" wound to (L)


                                                 calf


     #1


      Nutrition Diagnosis                        Underweight


      Etiology                                   advanced age, medical dx, poor


                                                 oral intake


      As Evidenced by Signs and Symptoms         MD requests ONS; pt reports wt


                                                 loss upon admission


     Is patient on ventilator?                   No


     Is Patient Ambulatory and/or Out of Bed     No


     REE-([facility]-confined to bed)      1370.868


     Kcal/Kg value to use for calculation        30


     Approximate Energy Requirements Using       1769


      kcal/Kg                                    


     Calculation Used for Recommendations        Kcal/kg


     Additional Notes                            Pro needs 1.2-1.3g/k-77g/


                                                 day


                                                 Fluid needs 1-1.5L


 Nutrition Intervention


     Change Diet Order:                          Continue current diet order


     Add Supplement/Snack (indicate name/kcal    Nepro once daily


      /protein )                                 


     Provides kCal:                              425


     Provides Protein (gm)                       19


     Goal #1                                     PO intake of meals plus ONS to


                                                 meet at least 75% energy and


                                                 pro needs


     Goal #2                                     Wt maintenance and/or gain


     Anticipated Discharge Needs:                Renal diet


     Follow-Up By:                               22


     Additional Comments                         F/U: Malnutrition assessment,


                                                 intakes

## 2022-08-13 NOTE — HISTORY AND PHYSICAL REPORT
History of Present Illness


Date of examination: 22


Date of admission: 


22 19:02





Chief complaint: 


Left lower extremity severe pain and unable to walk





History of present illness: 


72-year-old female with history of end-stage renal disease on peritoneal dialys

is, hypertension presents with left lower extremity pain numbness and 

significant difficulty in walking.  Her symptoms are confined to the left mid 

thigh and call and left foot.  Patient says that her symptoms started around 9 

AM.


Emergent  arterial duplex scan revealed---Marked peripheral artery disease of 

the left extremity with absent flow noted from the distal superficial femoral 

artery through the lower ankle arthritis.  Vascular surgery Was Informed and the

Patient Was Taken to the Cath Lab for immediate intervention.











- Past Medical History


--Hypertension: Yes (FOR 10+ YRS, DR. ABAD- PCP)


--Congestive Heart Failure: Yes ([place] )


--Diabetes: Yes


--Renal Disease: Yes (CKD STAGE 5, DR. GUTIERREZ- NEPHROLOGIST)


--Arthritis: Yes (Gout)


--Asthma: Yes (AS A CHILD)


--Additional medical history: Dilated cardiomyopathy / PERITONEAL DYALISIS





- Surgical History


--Appendectomy: Yes ([place] )


--Additional Surgical History: tonsil removed





- Social History


--Smoking Status: Never Smoker


--Substance Use Type: None





- Medications


Home Medications: 


                                Home Medications











 Medication  Instructions  Recorded  Confirmed  Last Taken  Type


 


NIFEdipine XL [Procardia Xl] 60 mg PO HS 08/21/15 02/07/22 02/06/22 History


 


carvediloL [Coreg] 6.25 mg PO BID 08/21/15 02/07/22 02/06/22 History


 


cloNIDine [Catapres] 0.2 mg PO DAILY 16 History


 


Hydralazine HCl 50 mg PO TID 21 History


 


Doxycycline Monohydrate 100 mg PO BID #20 cap 22  Unknown Rx





[Doxycycline Monohydrate CAP]     














Review of Systems


ROS: 


Stated complaint: LEFT LEG PAIN


Other details as noted in HPI





Comment: All other systems reviewed and negative


Constitutional: denies: fever


Cardiovascular: denies: chest pain


Gastrointestinal: denies: abdominal pain, hematemesis, melena, hematochezia


Musculoskeletal: myalgia.  denies: back pain


Neurological: weakness, numbness, paresthesias




















Past History


Past Medical History: diabetes, dialysis (peritoneal), ESRD, heart failure, 

hypertension, hyperlipidemia


Past Surgical History: Other (Peritoneal dialysis catheter, resection of left 

renal tumor)


Social history: no significant social history


Family history: no significant family history





Medications and Allergies


                                    Allergies











Allergy/AdvReac Type Severity Reaction Status Date / Time


 


digoxin Allergy  Hives Verified 22 17:52


 


lisinopril Allergy  Swelling Verified 22 17:52


 


nitroglycerin AdvReac  Rash, Verified 22 17:52





   HEADACHES  


 


shellfish derived AdvReac  Swelling Verified 22 17:52











                                Home Medications











 Medication  Instructions  Recorded  Confirmed  Last Taken  Type


 


NIFEdipine XL [Procardia Xl] 60 mg PO HS 08/21/15 02/07/22 02/06/22 History


 


carvediloL [Coreg] 6.25 mg PO BID 08/21/15 02/07/22 02/06/22 History


 


cloNIDine [Catapres] 0.2 mg PO DAILY 16 History


 


Hydralazine HCl 50 mg PO TID 21 History


 


Doxycycline Monohydrate 100 mg PO BID #20 cap 22  Unknown Rx





[Doxycycline Monohydrate CAP]     











Active Meds: 


Active Medications





Acetaminophen (Acetaminophen 325 Mg Tab)  650 mg PO Q4H PRN


   PRN Reason: Pain MILD(1-3)/Fever >100.5/HA


Hydrocodone Bitart/Acetaminophen (Hydrocodone/Acetaminophen 5-325 Mg Tab)  1 

each PO Q4H PRN


   PRN Reason: Pain, Moderate (4-6)


Carvedilol (Carvedilol 6.25 Mg Tab)  6.25 mg PO BID Formerly [facility], 1928–1983


   Last Admin: 22 22:59 Dose:  6.25 mg


   


Clonidine HCl (Clonidine 0.2 Mg Tab)  0.2 mg PO DAILY Formerly [facility], 1928–1983


Clopidogrel Bisulfate (Clopidogrel 75 Mg Tab)  75 mg PO QDAY Formerly [facility], 1928–1983


Heparin Sodium (Porcine) (Heparin 10,000 Units/10 Ml Vial)  2,400 unit 40 

unit/kg (2400 unit) IV Q6H PRN


   PRN Reason: Anti-Xa Assay < 0.1 units/ml


Hydralazine HCl (Hydralazine 25 Mg Tab)  50 mg PO TID [facility]ly [facility], 1928–1983


Heparin Sodium/Sodium Chloride (Heparin/ 0.45% Nacl-25,000 Unit/500 Ml)  25,000 

unit in 500 mls @ 16 mls/hr IV TITR ANTONY; Protocol


   Last Titration: 22 03:37 Dose:  800 units/hr, 16 mls/hr


   


Metoclopramide HCl (Metoclopramide 10 Mg/2 Ml Inj)  2.5 mg IV Q6H PRN


   PRN Reason: Nausea And Vomiting


Morphine Sulfate (Morphine 2 Mg/1 Ml Inj)  2 mg IV Q4H PRN


   PRN Reason: Pain, Moderate (4-6)


Morphine Sulfate (Morphine 4 Mg/1 Ml Inj)  4 mg IV Q4H PRN


   PRN Reason: Pain , Severe (7-10)


   Last Admin: 22 06:27 Dose:  4 mg


   


Nifedipine (Nifedipine Xl 60 Mg Tab)  60 mg PO HS [facility], [facility]


   Last Admin: 22 22:59 Dose:  60 mg


   


Ondansetron HCl (Ondansetron 4 Mg/2 Ml Inj)  4 mg IV Q8H PRN


   PRN Reason: Nausea And Vomiting


Oxycodone/Acetaminophen (Oxycodone /Acetaminophen 5-325mg Tab)  1 tab PO Q6H PRN


   PRN Reason: Pain, Moderate (4-6)


Peritoneal Dialysis Solution (Dialysate Pd2 1.5% Soln 2000 Ml)  2,000 ml IP Q6HR

Formerly [facility], [facility]


   Last Admin: 22 06:55 Dose:  Not Given


   


Sodium Chloride (Sodium Chloride 0.9% 10 Ml Flush Syringe)  10 ml IV BID [facility], [facility]


   Last Admin: 22 22:59 Dose:  10 ml


   


Sodium Chloride (Sodium Chloride 0.9% 10 Ml Flush Syringe)  10 ml IV PRN PRN


   PRN Reason: LINE FLUSH


   Last Admin: 22 06:29 Dose:  10 ml


   











Exam





- Constitutional


Vitals: 


                                        











Temp Pulse Resp BP Pulse Ox


 


 98.3 F   73   21   163/65   96 


 


 22 00:00  22 04:00  22 06:27  22 04:00  22 04:00











General appearance: Present: no acute distress, well-nourished





- EENT


Eyes: Present: PERRL


ENT: hearing intact, clear oral mucosa





- Neck


Neck: Present: supple, normal ROM





- Respiratory


Respiratory effort: normal


Respiratory: bilateral: CTA





- Cardiovascular


Heart rate: 78


Rhythm: regular


Heart Sounds: Present: S1 & S2.  Absent: rub, click





- Extremities


Extremities: No edema, abnormal (Left lower extremity cold to touch, but no 

gangrenous changes)


Extremity abnormal: pulses diminished, other (Same as overall)


Peripheral Pulses: within normal limits





- Abdominal


General gastrointestinal: Present: soft, non-tender, non-distended, normal bowel

sounds


Female genitourinary: Present: normal





- Integumentary


Integumentary: Present: clear, warm, dry





- Musculoskeletal


Musculoskeletal: gait normal, strength equal bilaterally





- Psychiatric


Psychiatric: appropriate mood/affect, intact judgment & insight





- Neurologic


Neurologic: CNII-XII intact, moves all extremities





HEART Score





- HEART Score


History: Moderately suspicious


Age: > 65


Risk factors: > 3 risk factors or hx of atherosclerotic disease


Troponin: 


                                        











Troponin T  0.203 ng/mL (0.00-0.029)  H*  22  17:51    











Troponin: < normal limit





- Critical Actions


Critical Actions: 4-6 pts:12-16.6% risk of adverse cardiac event. Should be 

admitted





Results





- Labs


CBC & Chem 7: 


                                 22 17:52





                                 22 17:51


Labs: 


                             Laboratory Last Values











WBC  8.7 K/mm3 (4.5-11.0)   22  17:52    


 


RBC  3.38 M/mm3 (3.65-5.03)  L  22  17:52    


 


Hgb  10.3 gm/dl (10.1-14.3)   22  17:52    


 


Hct  33.5 % (30.3-42.9)   22  17:52    


 


MCV  99 fl (79-97)  H  22  17:52    


 


MCH  31 pg (28-32)   22  17:52    


 


MCHC  31 % (30-34)   22  17:52    


 


RDW  23.2 % (13.2-15.2)  H  22  17:52    


 


Plt Count  274 K/mm3 (140-440)   22  17:52    


 


Lymph % (Auto)  8.6 % (13.4-35.0)  L  22  17:52    


 


Mono % (Auto)  5.3 % (0.0-7.3)   22  17:52    


 


Eos % (Auto)  4.0 % (0.0-4.3)   22  17:52    


 


Baso % (Auto)  2.1 % (0.0-1.8)  H  22  17:52    


 


Lymph # (Auto)  0.7 K/mm3 (1.2-5.4)  L  22  17:52    


 


Mono # (Auto)  0.5 K/mm3 (0.0-0.8)   22  17:52    


 


Eos # (Auto)  0.3 K/mm3 (0.0-0.4)   22  17:52    


 


Baso # (Auto)  0.2 K/mm3 (0.0-0.1)  H  22  17:52    


 


Seg Neutrophils %  80.0 % (40.0-70.0)  H  22  17:52    


 


Seg Neutrophils #  7.0 K/mm3 (1.8-7.7)   22  17:52    


 


PT  13.1 Sec. (12.2-14.9)   22  17:51    


 


INR  0.90  (0.87-1.13)   22  17:51    


 


APTT  29.2 Sec. (24.2-36.6)   22  17:51    


 


Heparin Anti-Xa Level  0.62 U.I./ml (0.3-0.7)   22  05:12    


 


Sodium  141 mmol/L (137-145)   22  17:51    


 


Potassium  3.7 mmol/L (3.6-5.0)   22  17:51    


 


Chloride  96.3 mmol/L ()  L  22  17:51    


 


Carbon Dioxide  25 mmol/L (22-30)   22  17:51    


 


Anion Gap  23 mmol/L  22  17:51    


 


BUN  48 mg/dL (7-17)  H  22  17:51    


 


Creatinine  10.1 mg/dL (0.6-1.2)  H  22  17:51    


 


Estimated GFR  5 ml/min  22  17:51    


 


BUN/Creatinine Ratio  5 %  22  17:51    


 


Glucose  91 mg/dL ()   22  17:51    


 


Lactic Acid  0.80 mmol/L (0.7-2.0)   22  17:52    


 


Calcium  8.8 mg/dL (8.4-10.2)   22  17:51    


 


Magnesium  1.60 mg/dL (1.7-2.3)  L  22  17:51    


 


Total Bilirubin  0.40 mg/dL (0.1-1.2)   22  17:51    


 


AST  14 units/L (5-40)   22  17:51    


 


ALT  13 units/L (7-56)   22  17:51    


 


Alkaline Phosphatase  176 units/L ()  H  22  17:51    


 


Total Creatine Kinase  99 units/L ()   22  17:51    


 


Troponin T  0.203 ng/mL (0.00-0.029)  H*  22  17:51    


 


Total Protein  6.2 g/dL (6.3-8.2)  L  22  17:51    


 


Albumin  3.4 g/dL (3.9-5)  L  22  17:51    


 


Albumin/Globulin Ratio  1.2 %  22  17:51    


 


Triglycerides  154 mg/dL (2-149)  H  22  17:51    


 


Cholesterol  131 mg/dL ()   22  17:51    


 


LDL Cholesterol Direct  59 mg/dL ()   22  17:51    


 


HDL Cholesterol  40 mg/dL (40-59)   22  17:51    


 


Cholesterol/HDL Ratio  3.27 %  22  17:51    








                                    Short CBC











  22 Range/Units





  17:52 


 


WBC  8.7  (4.5-11.0)  K/mm3


 


Hgb  10.3  (10.1-14.3)  gm/dl


 


Hct  33.5  (30.3-42.9)  %


 


Plt Count  274  (140-440)  K/mm3








                                       BMP











  22





  17:51


 


Sodium  141


 


Potassium  3.7


 


Chloride  96.3 L


 


Carbon Dioxide  25


 


BUN  48 H


 


Creatinine  10.1 H


 


Glucose  91


 


Calcium  8.8








                                 Cardiac Enzymes











  22 Range/Units





  17:51 


 


Total Creatine Kinase  99  ()  units/L


 


Troponin T  0.203 H*  (0.00-0.029)  ng/mL








                                 Liver Function











  22 Range/Units





  17:51 


 


Total Bilirubin  0.40  (0.1-1.2)  mg/dL


 


AST  14  (5-40)  units/L


 


ALT  13  (7-56)  units/L


 


Alkaline Phosphatase  176 H  ()  units/L


 


Albumin  3.4 L  (3.9-5)  g/dL














- Imaging and Cardiology


Imaging and Cardiology: 





Abdominal CTA





Left lower extremity arteries are not opacified from the distal SFA to the ankle

area similar to respiratory disease and poor opacification of the left ankle.





Duplex scan left lower extremity.








Mild peripheral artery disease of the left extremity with absent flow noted from

the distal superficial femoral artery through the lower ankle arteries





Venous duplex scan


No DVT





Assessment and Plan


Assessment and plan: 


Critical care statement


The high probability OF a clinically significant sudden or life-threatening 

deterioration of the cardiorespiratory system and endocrine system required my 

full and direct attention, intervention and postoperative management.  The 

aggregate critical care time was 40 minutes.  The time is in addition to time 

spent performing reported procedures but includes the followin: Data review and interpretation


2: Patient assessment and monitoring of vital signs


3: Documentation


4:: Medication orders and management


Advance Directives: Yes





- Patient Problems


(1) Acute occlusion of artery of lower extremity


Current Visit: Yes   Status: Acute   


Plan to address problem: 


Patient taken to the Cath Lab for intervention and possible stent placement


Patient was started on IV heparin


Admitted to [facility]








(2) Acute pain of left lower extremity


Current Visit: Yes   Status: Acute   


Plan to address problem: 


Emergent vascular surgery intervention








(3) End stage renal disease


Current Visit: Yes   Status: Chronic   


Plan to address problem: 


Nephrology consulted


Dialysis as per schedule








(4) Hypertension


Current Visit: Yes   Status: Chronic   


Qualifiers: 


   Hypertension type: primary hypertension 


Plan to address problem: 


Continue antihypertensives








(5) Dilated cardiomyopathy


Current Visit: Yes   Status: Chronic   


Plan to address problem: 


Increase ultrafiltration to prevent volume overload








(6) DVT prophylaxis


Current Visit: Yes   Status: Acute   


Plan to address problem: 


Patient on IV heparin and GI prophylaxis








(7) Advance care planning


Current Visit: Yes   Status: Acute   


Plan to address problem: 


Disease education conducted, care plan discussed, below diagnosis discussed and 

prognosis discussed.  Patient acknowledged understanding with care plan.  +30 

minutes.

## 2022-08-13 NOTE — PROGRESS NOTE
Assessment and Plan





Post procedure day #1 status post percutaneous mechanical thrombectomy of the 

left lower extremity as well as angioplasty and stenting of the popliteal 

artery.  The patient is doing well and her foot is well-perfused without 

evidence of compartment syndrome.





The patient has evidence of emboli in bilateral lower extremities which is cons

istent with a proximal source of emboli.  The patient will need a CTA of her 

chest to rule out a source of thrombus within the proximal aorta.  She will also

need an echocardiogram to rule out the heart as a source of emboli.





She should remain on a heparin drip until the source of her thrombus is i

dentified at which time the type and duration of anticoagulation can be 

determined.





Subjective


Date of service: 08/13/22


Principal diagnosis: Acute Left Lower Extremity Ischemia


Interval history: 





The patient complains of some numbness to the plantar surface of the left foot 

however she has no numbness in the remainder of the foot.  She denies having any

pain at this time.  She has no additional complaints at this time.





Objective





- Constitutional


Vitals: 


                               Vital Signs - 12hr











  08/13/22 08/13/22 08/13/22





  03:01 03:55 04:00


 


Temperature   


 


Pulse Rate 81 70 73


 


Pulse Rate [   73





From Monitor]   


 


Respiratory 20  18





Rate   


 


Respiratory   





Rate [Left Leg]   


 


Blood Pressure 177/57  163/65


 


O2 Sat by Pulse 97  96





Oximetry   














  08/13/22 08/13/22 08/13/22





  05:01 06:01 06:27


 


Temperature   


 


Pulse Rate 78 73 


 


Pulse Rate [   





From Monitor]   


 


Respiratory 18 17 21





Rate   


 


Respiratory   





Rate [Left Leg]   


 


Blood Pressure 171/67 153/66 


 


O2 Sat by Pulse 97 96 





Oximetry   














  08/13/22 08/13/22 08/13/22





  06:57 07:01 08:00


 


Temperature   97.9 F


 


Pulse Rate  69 83


 


Pulse Rate [   83





From Monitor]   


 


Respiratory 12 17 18





Rate   


 


Respiratory   





Rate [Left Leg]   


 


Blood Pressure  156/62 


 


O2 Sat by Pulse  91 97





Oximetry   














  08/13/22 08/13/22 08/13/22





  08:01 09:01 09:07


 


Temperature   


 


Pulse Rate 91 H 63 74


 


Pulse Rate [   





From Monitor]   


 


Respiratory 15 14 





Rate   


 


Respiratory   





Rate [Left Leg]   


 


Blood Pressure 153/66 153/60 153/60


 


O2 Sat by Pulse 93 93 





Oximetry   














  08/13/22 08/13/22 08/13/22





  10:01 11:01 12:00


 


Temperature   99.4 F


 


Pulse Rate 62 65 71


 


Pulse Rate [   71





From Monitor]   


 


Respiratory 15 18 17





Rate   


 


Respiratory   





Rate [Left Leg]   


 


Blood Pressure 98/32 106/54 


 


O2 Sat by Pulse 96 90 95





Oximetry   














  08/13/22 08/13/22 08/13/22





  12:01 13:00 14:00


 


Temperature   


 


Pulse Rate 67 65 66


 


Pulse Rate [   





From Monitor]   


 


Respiratory 18 21 19





Rate   


 


Respiratory   14





Rate [Left Leg]   


 


Blood Pressure 135/56 118/51 115/56


 


O2 Sat by Pulse 94 96 95





Oximetry   











General appearance: Present: no acute distress





- Respiratory


Respiratory effort: normal





- Breasts


Breasts: deferred





- Cardiovascular


Rhythm: regular


Extremities: pulses intact (Doppler signals in bilateral pedal arteries), normal

temperature (Bilateral feet are warm and well-perfused), abnormal (Right groin 

access site is soft and without evidence of hematoma)


Extremity abnormal: ulceration (Ulceration with scabbing over the left Achilles 

(patient believes it is secondary to a spider bite)), tenderness (The patient's 

calf is soft and without any signs or symptoms consistent with compartment 

syndrome.  She has some tenderness in the posterior calf around an area of 

ulceration however the remainder of the calf this nontender)





- Gastrointestinal


General gastrointestinal: Present: soft, non-tender





- Labs


CBC & Chem 7: 


                                 08/13/22 08:45





                                 08/12/22 17:51


Labs: 


                              Abnormal lab results











  08/12/22 08/12/22 08/13/22 Range/Units





  17:51 17:52 08:45 


 


RBC   3.38 L  2.87 L  (3.65-5.03)  M/mm3


 


Hgb    8.7 L  (10.1-14.3)  gm/dl


 


Hct    28.1 L  (30.3-42.9)  %


 


MCV   99 H  98 H  (79-97)  fl


 


RDW   23.2 H  23.8 H  (13.2-15.2)  %


 


Lymph % (Auto)   8.6 L   (13.4-35.0)  %


 


Mono % (Auto)    9.6 H  (0.0-7.3)  %


 


Eos % (Auto)    6.9 H  (0.0-4.3)  %


 


Baso % (Auto)   2.1 H   (0.0-1.8)  %


 


Lymph # (Auto)   0.7 L  1.1 L  (1.2-5.4)  K/mm3


 


Eos # (Auto)    0.5 H  (0.0-0.4)  K/mm3


 


Baso # (Auto)   0.2 H   (0.0-0.1)  K/mm3


 


Seg Neutrophils %   80.0 H   (40.0-70.0)  %


 


Chloride  96.3 L    ()  mmol/L


 


BUN  48 H    (7-17)  mg/dL


 


Creatinine  10.1 H    (0.6-1.2)  mg/dL


 


Magnesium  1.60 L    (1.7-2.3)  mg/dL


 


Alkaline Phosphatase  176 H    ()  units/L


 


Troponin T  0.203 H*    (0.00-0.029)  ng/mL


 


Total Protein  6.2 L    (6.3-8.2)  g/dL


 


Albumin  3.4 L    (3.9-5)  g/dL


 


Triglycerides  154 H    (2-149)  mg/dL














Medications & Allergies





- Medications


Allergies/Adverse Reactions: 


                                    Allergies





digoxin Allergy (Verified 08/12/22 17:52)


   Hives


lisinopril Allergy (Verified 08/12/22 17:52)


   Swelling


nitroglycerin Adverse Reaction (Verified 08/12/22 17:52)


   Rash, HEADACHES


shellfish derived Adverse Reaction (Verified 08/12/22 17:52)


   Swelling








Home Medications: 


                                Home Medications











 Medication  Instructions  Recorded  Confirmed  Last Taken  Type


 


NIFEdipine XL [Procardia Xl] 60 mg PO HS 08/21/15 02/07/22 02/06/22 History


 


carvediloL [Coreg] 6.25 mg PO BID 08/21/15 02/07/22 02/06/22 History


 


cloNIDine [Catapres] 0.2 mg PO DAILY 12/09/16 02/07/22 02/06/22 History


 


Hydralazine HCl 50 mg PO TID 05/12/21 02/07/22 02/06/22 History


 


Doxycycline Monohydrate 100 mg PO BID #20 cap 02/07/22  Unknown Rx





[Doxycycline Monohydrate CAP]     











Active Medications: 














Generic Name Dose Route Start Last Admin





  Trade Name Freq  PRN Reason Stop Dose Admin


 


Acetaminophen  650 mg  08/12/22 21:55 





  Acetaminophen 325 Mg Tab  PO  





  Q4H PRN  





  Pain MILD(1-3)/Fever >100.5/HA  


 


Hydrocodone Bitart/Acetaminophen  1 each  08/12/22 21:02 





  Hydrocodone/Acetaminophen 5-325 Mg Tab  PO  





  Q4H PRN  





  Pain, Moderate (4-6)  


 


Carvedilol  6.25 mg  08/12/22 22:00  08/13/22 09:07





  Carvedilol 6.25 Mg Tab  PO   6.25 mg





  BID ANTONY   Administration


 


Clonidine HCl  0.2 mg  08/13/22 10:00  08/13/22 09:07





  Clonidine 0.2 Mg Tab  PO   0.2 mg





  DAILY ANTONY   Administration


 


Clopidogrel Bisulfate  75 mg  08/13/22 10:00  08/13/22 09:07





  Clopidogrel 75 Mg Tab  PO   75 mg





  QDAY ANTONY   Administration


 


Heparin Sodium (Porcine)  2,400 unit  08/12/22 17:37 





  Heparin 10,000 Units/10 Ml Vial  40 unit/kg (2400 unit)  





  IV  





  Q6H PRN  





  Anti-Xa Assay < 0.1 units/ml  


 


Hydralazine HCl  50 mg  08/13/22 08:00  08/13/22 14:16





  Hydralazine 25 Mg Tab  PO   Not Given





  TID [facility]  


 


Heparin Sodium/Sodium Chloride  25,000 unit in 500 mls @ 16 mls/hr  08/12/22 

18:00  08/13/22 07:08





  Heparin/ 0.45% Nacl-25,000 Unit/500 Ml  IV   800 units/hr





  TITR ANTONY   16 mls/hr





    Titration





  Protocol  





  800 UNITS/HR  


 


Metoclopramide HCl  2.5 mg  08/12/22 22:05 





  Metoclopramide 10 Mg/2 Ml Inj  IV  





  Q6H PRN  





  Nausea And Vomiting  


 


Morphine Sulfate  2 mg  08/12/22 21:55 





  Morphine 2 Mg/1 Ml Inj  IV  





  Q4H PRN  





  Pain, Moderate (4-6)  


 


Morphine Sulfate  4 mg  08/12/22 21:55  08/13/22 06:27





  Morphine 4 Mg/1 Ml Inj  IV   4 mg





  Q4H PRN   Administration





  Pain , Severe (7-10)  


 


Nifedipine  60 mg  08/12/22 22:00  08/12/22 22:59





  Nifedipine Xl 60 Mg Tab  PO   60 mg





  [facility]   Administration


 


Ondansetron HCl  4 mg  08/12/22 21:55 





  Ondansetron 4 Mg/2 Ml Inj  IV  





  Q8H PRN  





  Nausea And Vomiting  


 


Oxycodone/Acetaminophen  1 tab  08/12/22 21:55 





  Oxycodone /Acetaminophen 5-325mg Tab  PO  





  Q6H PRN  





  Pain, Moderate (4-6)  


 


Peritoneal Dialysis Solution  2,000 ml  08/13/22 00:00  08/13/22 12:07





  Dialysate Pd2 1.5% Soln 2000 Ml  IP   2,000 ml





  Q6HR ANTONY   Administration


 


Sodium Chloride  10 ml  08/12/22 22:00  08/13/22 09:06





  Sodium Chloride 0.9% 10 Ml Flush Syringe  IV   10 ml





  BID ANTONY   Administration


 


Sodium Chloride  10 ml  08/12/22 21:55  08/13/22 06:29





  Sodium Chloride 0.9% 10 Ml Flush Syringe  IV   10 ml





  PRN PRN   Administration





  LINE FLUSH  














HEART Score





- HEART Score


Age: > 65


Risk factors: > 3 risk factors or hx of atherosclerotic disease


Troponin: 


                                        











Troponin T  0.203 ng/mL (0.00-0.029)  H*  08/12/22  17:51    











Troponin: < normal limit





- Critical Actions


Critical Actions: 4-6 pts:12-16.6% risk of adverse cardiac event. Should be 

admitted

## 2022-08-13 NOTE — CAT SCAN REPORT
CTA CHEST WITH CONTRAST



INDICATION / CLINICAL INFORMATION: R/O aortic thrombus. Peripheral vascular disease



TECHNIQUE: Axial CT images were obtained through the chest after injection of 100 cc Omnipaque 350 IV
 contrast. 3 plane MIP and/or 3D reconstructions were produced. All CT scans at this location are per
formed using CT dose reduction for ALARA by means of automated exposure control. 



COMPARISON: CTA of the abdomen and pelvis dated 8/12/2022



FINDINGS:

PULMONARY ARTERIES: No pulmonary emboli.

THORACIC AORTA: Mild atherosclerotic calcification without acute abnormality.No unstable aortic throm
bus is identified. 

HEART: No significant abnormality.

CORONARY ARTERY CALCIFICATION: Moderate.

MEDIASTINUM / GABO: No significant abnormality.

PLEURA: No pleural effusion. No pneumothorax.

LUNGS: No acute air space or interstitial disease. 



ADDITIONAL FINDINGS: None.



UPPER ABDOMEN: Intra-abdominal ascites. Atrophic appearing kidneys with multiple renal cysts.



SKELETAL STRUCTURES: No significant osseous abnormality.



IMPRESSION:

1. No CT evidence for pulmonary embolism. 

2. No CT evidence for unstable aortic thrombus. There is mild atherosclerotic disease of the aorta.



Signer Name: Delvin Iverson DO 

Signed: 8/13/2022 4:20 PM

Workstation Name: [id number]

## 2022-08-13 NOTE — CONSULTATION
History of Present Illness





- Reason for Consult


Consult date: 08/13/22


end stage renal disease





- History of Present Illness





This is a 72 year-old woman with ESRD who presents for left pain of thigh, foot.

 Underwent stat procedure per vascular for absent flow from distal superficial 

femoral artery through the lower ankle.  Feeling much better this AM.  





Patient usually dialyzes at home, follows with Dr. Moreira for PD.  Denies any 

recent issues with PD including dizziness, lightheadedness, cramping, chest 

pain.  No drain pains.





Currently, patient denies any issues including dyspnea, edema, access issues, 

nausea, vomiting, headaches.





Past History


Past Medical History: diabetes, dialysis (peritoneal), ESRD, heart failure, 

hypertension, hyperlipidemia


Past Surgical History: Other (Peritoneal dialysis catheter, resection of left 

renal tumor)


Social history: no significant social history


Family history: no significant family history





Medications and Allergies


                                    Allergies











Allergy/AdvReac Type Severity Reaction Status Date / Time


 


digoxin Allergy  Hives Verified 08/12/22 17:52


 


lisinopril Allergy  Swelling Verified 08/12/22 17:52


 


nitroglycerin AdvReac  Rash, Verified 08/12/22 17:52





   HEADACHES  


 


shellfish derived AdvReac  Swelling Verified 08/12/22 17:52











                                Home Medications











 Medication  Instructions  Recorded  Confirmed  Last Taken  Type


 


NIFEdipine XL [Procardia Xl] 60 mg PO HS 08/21/15 02/07/22 02/06/22 History


 


carvediloL [Coreg] 6.25 mg PO BID 08/21/15 02/07/22 02/06/22 History


 


cloNIDine [Catapres] 0.2 mg PO DAILY 12/09/16 02/07/22 02/06/22 History


 


Hydralazine HCl 50 mg PO TID 05/12/21 02/07/22 02/06/22 History


 


Doxycycline Monohydrate 100 mg PO BID #20 cap 02/07/22  Unknown Rx





[Doxycycline Monohydrate CAP]     











Active Meds: 


Active Medications





Acetaminophen (Acetaminophen 325 Mg Tab)  650 mg PO Q4H PRN


   PRN Reason: Pain MILD(1-3)/Fever >100.5/HA


Hydrocodone Bitart/Acetaminophen (Hydrocodone/Acetaminophen 5-325 Mg Tab)  1 

each PO Q4H PRN


   PRN Reason: Pain, Moderate (4-6)


Carvedilol (Carvedilol 6.25 Mg Tab)  6.25 mg PO BID [facility]


   Last Admin: 08/13/22 09:07 Dose:  6.25 mg


   


Clonidine HCl (Clonidine 0.2 Mg Tab)  0.2 mg PO DAILY [facility]


   Last Admin: 08/13/22 09:07 Dose:  0.2 mg


   


Clopidogrel Bisulfate (Clopidogrel 75 Mg Tab)  75 mg PO QDAY [facility]


   Last Admin: 08/13/22 09:07 Dose:  75 mg


   


Heparin Sodium (Porcine) (Heparin 10,000 Units/10 Ml Vial)  2,400 unit 40 

unit/kg (2400 unit) IV Q6H PRN


   PRN Reason: Anti-Xa Assay < 0.1 units/ml


Hydralazine HCl (Hydralazine 25 Mg Tab)  50 mg PO TID [facility]


   Last Admin: 08/13/22 14:16 Dose:  Not Given


   


Heparin Sodium/Sodium Chloride (Heparin/ 0.45% Nacl-25,000 Unit/500 Ml)  25,000 

unit in 500 mls @ 16 mls/hr IV TITR [facility]; Protocol


   Last Titration: 08/13/22 07:08 Dose:  800 units/hr, 16 mls/hr


   


Metoclopramide HCl (Metoclopramide 10 Mg/2 Ml Inj)  2.5 mg IV Q6H PRN


   PRN Reason: Nausea And Vomiting


Morphine Sulfate (Morphine 2 Mg/1 Ml Inj)  2 mg IV Q4H PRN


   PRN Reason: Pain, Moderate (4-6)


Morphine Sulfate (Morphine 4 Mg/1 Ml Inj)  4 mg IV Q4H PRN


   PRN Reason: Pain , Severe (7-10)


   Last Admin: 08/13/22 06:27 Dose:  4 mg


   


Nifedipine (Nifedipine Xl 60 Mg Tab)  60 mg PO HS [facility]


   Last Admin: 08/12/22 22:59 Dose:  60 mg


   


Ondansetron HCl (Ondansetron 4 Mg/2 Ml Inj)  4 mg IV Q8H PRN


   PRN Reason: Nausea And Vomiting


Oxycodone/Acetaminophen (Oxycodone /Acetaminophen 5-325mg Tab)  1 tab PO Q6H PRN


   PRN Reason: Pain, Moderate (4-6)


Peritoneal Dialysis Solution (Dialysate Pd2 1.5% Soln 2000 Ml)  2,000 ml IP Q6HR

[facility]


   Last Admin: 08/13/22 12:07 Dose:  2,000 ml


   


Sodium Chloride (Sodium Chloride 0.9% 10 Ml Flush Syringe)  10 ml IV BID [facility]


   Last Admin: 08/13/22 09:06 Dose:  10 ml


   


Sodium Chloride (Sodium Chloride 0.9% 10 Ml Flush Syringe)  10 ml IV PRN PRN


   PRN Reason: LINE FLUSH


   Last Admin: 08/13/22 06:29 Dose:  10 ml


   











Review of Systems


All systems: negative (as per HPI)





Exam





- Vital Signs


Vital signs: 


                                   Vital Signs











Temp Pulse Resp BP Pulse Ox


 


 97.9 F   88   16   210/98   98 


 


 08/12/22 12:54  08/12/22 12:54  08/12/22 12:54  08/12/22 12:54  08/12/22 12:54














- Physical Exam


Narrative exam: 





Constitutional: no acute distress


Head: NC/AT


Neck: supple


Lungs: clear to auscultation


CV: RRR, no M/R/G


Abdomen: soft, non-tender, bowel sounds present


Back: nontender


Extremities: no edema, pulses WNL


Skin: intact


Neuro: no focal deficits, alert and oriented x4





Results





- Lab Results





                                 08/13/22 08:45





                                 08/12/22 17:51


                             Most recent lab results











Calcium  8.8 mg/dL (8.4-10.2)   08/12/22  17:51    


 


Magnesium  1.60 mg/dL (1.7-2.3)  L  08/12/22  17:51    














Assessment and Plan





This is a 72 year old woman who presents with ***





# ESRD: if having home PD machine, can continue CCPD per home settings.  

Otherwise, CAPD 2L fills q6 hours, can increase to 2.5% dextrose if needed


- daily labs


- renally dose meds


- avoid nephrotoxins


- renal diet


- verbal consent obtained for HD





# Anemia: last hemoglobin 10.3->8.7; will restart ESAs once weekly   





# HTN: Adjust dextrose prn.  BP stable





# Secondary Hyperparathyroidism: continue home binders as needed, vitamin D 

analogs prn





# Acute occlusion of artery of lower extremity: appreciate vascular

## 2022-08-14 PROCEDURE — 3E1M39Z IRRIGATION OF PERITONEAL CAVITY USING DIALYSATE, PERCUTANEOUS APPROACH: ICD-10-PCS | Performed by: SURGERY

## 2022-08-14 RX ADMIN — NIFEDIPINE SCH MG: 60 TABLET, EXTENDED RELEASE ORAL at 21:08

## 2022-08-14 RX ADMIN — Medication SCH ML: at 09:27

## 2022-08-14 RX ADMIN — SODIUM CHLORIDE, SODIUM LACTATE, CALCIUM CHLORIDE, MAGNESIUM CHLORIDE AND DEXTROSE SCH ML: 1.5; 538; 448; 25.7; 5.08 INJECTION, SOLUTION INTRAPERITONEAL at 00:40

## 2022-08-14 RX ADMIN — CLOPIDOGREL BISULFATE SCH MG: 75 TABLET ORAL at 09:27

## 2022-08-14 RX ADMIN — APIXABAN SCH: 5 TABLET, FILM COATED ORAL at 21:08

## 2022-08-14 RX ADMIN — HEPARIN SODIUM SCH MLS/HR: 5000 INJECTION, SOLUTION INTRAVENOUS at 05:18

## 2022-08-14 RX ADMIN — APIXABAN SCH MG: 5 TABLET, FILM COATED ORAL at 15:23

## 2022-08-14 RX ADMIN — Medication SCH ML: at 21:09

## 2022-08-14 NOTE — PROGRESS NOTE
Assessment and Plan





The patient underwent a CTA of the chest as well as an echocardiogram yesterday.

 There was no evidence of thrombus within the heart or any thrombus within the 

thoracic aorta.  At this time there is no source of her emboli has been identif

ied however the source has to be proximal given the findings of emboli in 

bilateral lower extremities.





The patient does relate history of significant amount of bleeding in December 2021.  She states she believed it was a vaginal bleeding with a large amount of 

blood and clot expelled.  She states she had a few episodes of spotting prior to

that however she has had no additional episodes since that time.  She did 

undergo a Pap smear as well as colonoscopy and there were no significant 

findings on the Pap smear.  She states she had previously had polyps however 

there were no significant findings on her most recent colonoscopy.  Both studies

were performed in January 2022.





At this time I will recommend a hematology consult to rule out a hypercoagulable

state and assist with determination of how long she will require 

anticoagulation.  At this time I discussed with the patient the possible need 

for lifelong anticoagulation if a source or hypercoagulable disorder are not 

identified.  She can be converted to oral anticoagulation with Eliquis 5 mg p.o.

twice daily.  The patient has expressed understanding of the plan and agrees.





Patient can be transferred from the [facility] from a vascular surgery standpoint.





Subjective


Date of service: 08/14/22


Principal diagnosis: Acute Left Lower Extremity Ischemia


Interval history: 





Patient complains of some left foot numbness however she states it is signifi

cantly improved from yesterday.  She denies any pain and has no additional 

complaints at this time.





Objective





- Constitutional


Vitals: 


                               Vital Signs - 12hr











  08/14/22 08/14/22 08/14/22





  03:00 04:00 05:00


 


Temperature  98.7 F 


 


Pulse Rate 70 84 83


 


Pulse Rate [  84 





From Monitor]   


 


Respiratory 16 18 18





Rate   


 


Blood Pressure 123/50 123/56 161/65


 


O2 Sat by Pulse 97 99 97





Oximetry   














  08/14/22 08/14/22 08/14/22





  06:00 07:36 09:27


 


Temperature  98.0 F 


 


Pulse Rate 66  71


 


Pulse Rate [   





From Monitor]   


 


Respiratory 29 H  





Rate   


 


Blood Pressure 142/59  191/76


 


O2 Sat by Pulse 100  





Oximetry   














  08/14/22





  12:53


 


Temperature 97.6 F


 


Pulse Rate 


 


Pulse Rate [ 





From Monitor] 


 


Respiratory 





Rate 


 


Blood Pressure 


 


O2 Sat by Pulse 





Oximetry 











General appearance: Present: no acute distress





- Neck


Neck: supple





- Respiratory


Respiratory effort: normal





- Cardiovascular


Rhythm: regular


Extremities: no ischemia, normal temperature (Bilateral lower extremities are 

warm and well-perfused), abnormal (Right groin is soft and without evidence of 

hematoma or pseudoaneurysm)





- Gastrointestinal


General gastrointestinal: Present: soft, non-tender


Rectal Exam: deferred





- Genitourinary


Female genitourinary: deferred





- Labs


CBC & Chem 7: 


                                 08/13/22 08:45





                                 08/12/22 17:51


Labs: 


                              Abnormal lab results











  08/14/22 Range/Units





  07:28 


 


Heparin Anti-Xa Level  0.28 L  (0.3-0.7)  U.I./ml














Medications & Allergies





- Medications


Allergies/Adverse Reactions: 


                                    Allergies





digoxin Allergy (Verified 08/12/22 17:52)


   Hives


lisinopril Allergy (Verified 08/12/22 17:52)


   Swelling


nitroglycerin Adverse Reaction (Verified 08/12/22 17:52)


   Rash, HEADACHES


shellfish derived Adverse Reaction (Verified 08/12/22 17:52)


   Swelling








Home Medications: 


                                Home Medications











 Medication  Instructions  Recorded  Confirmed  Last Taken  Type


 


NIFEdipine XL [Procardia Xl] 60 mg PO HS 08/21/15 08/13/22 02/06/22 History


 


carvediloL [Coreg] 6.25 mg PO BID 08/21/15 08/13/22 02/06/22 History


 


cloNIDine [Catapres] 0.2 mg PO BID 12/09/16 08/13/22 02/06/22 History


 


Hydralazine HCl 50 mg PO TID 05/12/21 08/13/22 02/06/22 History


 


Doxycycline Monohydrate 100 mg PO BID #20 cap 02/07/22 08/13/22 Unknown Rx





[Doxycycline Monohydrate CAP]     











Active Medications: 














Generic Name Dose Route Start Last Admin





  Trade Name Freq  PRN Reason Stop Dose Admin


 


Acetaminophen  650 mg  08/12/22 21:55 





  Acetaminophen 325 Mg Tab  PO  





  Q4H PRN  





  Pain MILD(1-3)/Fever >100.5/HA  


 


Hydrocodone Bitart/Acetaminophen  1 each  08/12/22 21:02 





  Hydrocodone/Acetaminophen 5-325 Mg Tab  PO  





  Q4H PRN  





  Pain, Moderate (4-6)  


 


Carvedilol  6.25 mg  08/12/22 22:00  08/14/22 09:27





  Carvedilol 6.25 Mg Tab  PO   6.25 mg





  BID [facility]   Administration


 


Clonidine HCl  0.2 mg  08/13/22 10:00  08/14/22 09:27





  Clonidine 0.2 Mg Tab  PO   0.2 mg





  DAILY [facility]   Administration


 


Clopidogrel Bisulfate  75 mg  08/13/22 10:00  08/14/22 09:27





  Clopidogrel 75 Mg Tab  PO   75 mg





  QDAY [facility]   Administration


 


Heparin Sodium (Porcine)  2,400 unit  08/12/22 17:37 





  Heparin 10,000 Units/10 Ml Vial  40 unit/kg (2400 unit)  





  IV  





  Q6H PRN  





  Anti-Xa Assay < 0.1 units/ml  


 


Hydralazine HCl  50 mg  08/13/22 08:00  08/14/22 09:27





  Hydralazine 25 Mg Tab  PO   50 mg





  TID [facility]   Administration


 


Heparin Sodium/Sodium Chloride  25,000 unit in 500 mls @ 16 mls/hr  08/12/22 

18:00  08/14/22 08:52





  Heparin/ 0.45% Nacl-25,000 Unit/500 Ml  IV   850 units/hr





  TITR [facility]   17 mls/hr





    Titration





  Protocol  





  800 UNITS/HR  


 


Metoclopramide HCl  2.5 mg  08/12/22 22:05 





  Metoclopramide 10 Mg/2 Ml Inj  IV  





  Q6H PRN  





  Nausea And Vomiting  


 


Morphine Sulfate  2 mg  08/12/22 21:55 





  Morphine 2 Mg/1 Ml Inj  IV  





  Q4H PRN  





  Pain, Moderate (4-6)  


 


Morphine Sulfate  4 mg  08/12/22 21:55  08/13/22 06:27





  Morphine 4 Mg/1 Ml Inj  IV   4 mg





  Q4H PRN   Administration





  Pain , Severe (7-10)  


 


Nifedipine  60 mg  08/12/22 22:00  08/13/22 22:09





  Nifedipine Xl 60 Mg Tab  PO   Not Given





  [facility]  


 


Ondansetron HCl  4 mg  08/12/22 21:55 





  Ondansetron 4 Mg/2 Ml Inj  IV  





  Q8H PRN  





  Nausea And Vomiting  


 


Oxycodone/Acetaminophen  1 tab  08/12/22 21:55  08/13/22 17:30





  Oxycodone /Acetaminophen 5-325mg Tab  PO   1 tab





  Q6H PRN   Administration





  Pain, Moderate (4-6)  


 


Peritoneal Dialysis Solution  2,000 ml  08/13/22 00:00  08/14/22 00:40





  Dialysate Pd2 1.5% Soln 2000 Ml  IP   2,000 ml





  Q6HR [facility]   Administration


 


Sodium Chloride  10 ml  08/12/22 22:00  08/14/22 09:27





  Sodium Chloride 0.9% 10 Ml Flush Syringe  IV   10 ml





  BID [facility]   Administration


 


Sodium Chloride  10 ml  08/12/22 21:55  08/13/22 06:29





  Sodium Chloride 0.9% 10 Ml Flush Syringe  IV   10 ml





  PRN PRN   Administration





  LINE FLUSH  














HEART Score





- HEART Score


Age: > 65


Risk factors: > 3 risk factors or hx of atherosclerotic disease


Troponin: 


                                        











Troponin T  0.203 ng/mL (0.00-0.029)  H*  08/12/22  17:51    











Troponin: < normal limit





- Critical Actions


Critical Actions: 4-6 pts:12-16.6% risk of adverse cardiac event. Should be 

admitted

## 2022-08-14 NOTE — PROGRESS NOTE
Assessment and Plan


Assessment and plan: 





History of present illness: 


72-year-old female with history of end-stage renal disease on peritoneal 

dialysis, hypertension presents with left lower extremity pain numbness and 

significant difficulty in walking.  Her symptoms are confined to the left mid 

thigh and call and left foot.  Patient says that her symptoms started around 9 

AM.


Emergent  arterial duplex scan revealed---Marked peripheral artery disease of 

the left extremity with absent flow noted from the distal superficial femoral 

artery through the lower ankle arthritis.  Vascular surgery Was Informed and the

Patient Was Taken to the Cath Lab for immediate intervention.





Hospital Course:


: No acute complaints this AM. States that LLE symptomology significantly 

improved, no pain/numbness/paresthesias reported today. good dp and pt pulses 

appreciated.





: Vascular recommends ECHO and CTA chest to r/o intracardiac thrombus. CTA 

chest negative for thrombus. ECHO pending. Continue heparin gtt.





Assessment and Plan:


(1) Acute occlusion of artery of lower extremity


Current Visit: Yes   Status: Acute   


Plan to address problem: 


Patient taken to the Cath Lab for intervention and possible stent placement


Patient was started on IV heparin


Admitted to [facility]








(2) Acute pain of left lower extremity


Current Visit: Yes   Status: Acute   


Plan to address problem: 


Emergent vascular surgery intervention








(3) End stage renal disease


Current Visit: Yes   Status: Chronic   


Plan to address problem: 


Nephrology consulted


Dialysis as per schedule








(4) Hypertension


Current Visit: Yes   Status: Chronic   


Qualifiers: 


   Hypertension type: primary hypertension 


Plan to address problem: 


Continue antihypertensives








(5) Dilated cardiomyopathy


Current Visit: Yes   Status: Chronic   


Plan to address problem: 


Increase ultrafiltration to prevent volume overload








(6) DVT prophylaxis


Current Visit: Yes   Status: Acute   


Plan to address problem: 


Patient on IV heparin and GI prophylaxis








(7) Advance care planning


Current Visit: Yes   Status: Acute   


Plan to address problem: 


Disease education conducted, care plan discussed, below diagnosis discussed and 

prognosis discussed.  Patient acknowledged understanding with care plan.  +30 

minutes.





Critical care statement


The high probability OF a clinically significant sudden or life-threatening 

deterioration of the cardiorespiratory system and endocrine system required my 

full and direct attention, intervention and postoperative management.  The 

aggregate critical care time was 40 minutes.  The time is in addition to time 

spent performing reported procedures but includes the followin: Data review and interpretation


2: Patient assessment and monitoring of vital signs


3: Documentation


4:: Medication orders and management


Advance Directives: Yes





History


Interval history: 





No acute complaints on bedside encounter.





Hospitalist Physical





- Physical exam


Narrative exam: 





General appearance: Present: no acute distress, well-nourished





- EENT


Eyes: Present: PERRL


ENT: hearing intact, clear oral mucosa





- Neck


Neck: Present: supple, normal ROM





- Respiratory


Respiratory effort: normal


Respiratory: bilateral: CTA





- Cardiovascular


Heart rate: 78


Rhythm: regular


Heart Sounds: Present: S1 & S2.  Absent: rub, click





- Extremities


Extremities: No edema, abnormal (Left lower extremity cold to touch, but no 

gangrenous changes)--> improved now warm to touch


Extremity abnormal: pulses palpable in DP and PT other (Same as overall)


Peripheral Pulses: within normal limits





- Abdominal


General gastrointestinal: Present: soft, non-tender, non-distended, normal bowel

sounds


Female genitourinary: Present: normal





- Integumentary


Integumentary: Present: clear, warm, dry





- Musculoskeletal


Musculoskeletal: gait normal, strength equal bilaterally





- Psychiatric


Psychiatric: appropriate mood/affect, intact judgment & insight





- Neurologic


Neurologic: CNII-XII intact, moves all extremities





- Constitutional


Vitals: 


                                        











Temp Pulse Resp BP Pulse Ox


 


 98.0 F   71   29 H  191/76   100 


 


 22 07:36  22 09:27  22 06:00  22 09:27  22 06:00











General appearance: Present: no acute distress





HEART Score





- HEART Score


Age: > 65


Risk factors: > 3 risk factors or hx of atherosclerotic disease


Troponin: 


                                        











Troponin T  0.203 ng/mL (0.00-0.029)  H*  22  17:51    











Troponin: < normal limit





- Critical Actions


Critical Actions: 4-6 pts:12-16.6% risk of adverse cardiac event. Should be 

admitted





Results





- Labs


CBC & Chem 7: 


                                 22 08:45





                                 22 17:51


Labs: 


                             Laboratory Last Values











WBC  7.7 K/mm3 (4.5-11.0)   22  08:45    


 


RBC  2.87 M/mm3 (3.65-5.03)  L  22  08:45    


 


Hgb  8.7 gm/dl (10.1-14.3)  L  22  08:45    


 


Hct  28.1 % (30.3-42.9)  L  22  08:45    


 


MCV  98 fl (79-97)  H  22  08:45    


 


MCH  30 pg (28-32)   22  08:45    


 


MCHC  31 % (30-34)   22  08:45    


 


RDW  23.8 % (13.2-15.2)  H  22  08:45    


 


Plt Count  226 K/mm3 (140-440)   22  08:45    


 


Lymph % (Auto)  14.9 % (13.4-35.0)   22  08:45    


 


Mono % (Auto)  9.6 % (0.0-7.3)  H  22  08:45    


 


Eos % (Auto)  6.9 % (0.0-4.3)  H  22  08:45    


 


Baso % (Auto)  1.2 % (0.0-1.8)   22  08:45    


 


Lymph # (Auto)  1.1 K/mm3 (1.2-5.4)  L  22  08:45    


 


Mono # (Auto)  0.7 K/mm3 (0.0-0.8)   22  08:45    


 


Eos # (Auto)  0.5 K/mm3 (0.0-0.4)  H  22  08:45    


 


Baso # (Auto)  0.1 K/mm3 (0.0-0.1)   22  08:45    


 


Seg Neutrophils %  67.4 % (40.0-70.0)   22  08:45    


 


Seg Neutrophils #  5.2 K/mm3 (1.8-7.7)   22  08:45    


 


PT  13.1 Sec. (12.2-14.9)   22  17:51    


 


INR  0.90  (0.87-1.13)   22  17:51    


 


APTT  29.2 Sec. (24.2-36.6)   22  17:51    


 


Heparin Anti-Xa Level  0.28 U.I./ml (0.3-0.7)  L  22  07:28    


 


Sodium  141 mmol/L (137-145)   22  17:51    


 


Potassium  3.7 mmol/L (3.6-5.0)   22  17:51    


 


Chloride  96.3 mmol/L ()  L  22  17:51    


 


Carbon Dioxide  25 mmol/L (22-30)   22  17:51    


 


Anion Gap  23 mmol/L  22  17:51    


 


BUN  48 mg/dL (7-17)  H  22  17:51    


 


Creatinine  10.1 mg/dL (0.6-1.2)  H  22  17:51    


 


Estimated GFR  5 ml/min  22  17:51    


 


BUN/Creatinine Ratio  5 %  22  17:51    


 


Glucose  91 mg/dL ()   22  17:51    


 


Lactic Acid  0.80 mmol/L (0.7-2.0)   22  17:52    


 


Calcium  8.8 mg/dL (8.4-10.2)   22  17:51    


 


Magnesium  1.60 mg/dL (1.7-2.3)  L  22  17:51    


 


Total Bilirubin  0.40 mg/dL (0.1-1.2)   22  17:51    


 


AST  14 units/L (5-40)   22  17:51    


 


ALT  13 units/L (7-56)   22  17:51    


 


Alkaline Phosphatase  176 units/L ()  H  22  17:51    


 


Total Creatine Kinase  99 units/L ()   22  17:51    


 


Troponin T  0.203 ng/mL (0.00-0.029)  H*  22  17:51    


 


Total Protein  6.2 g/dL (6.3-8.2)  L  22  17:51    


 


Albumin  3.4 g/dL (3.9-5)  L  22  17:51    


 


Albumin/Globulin Ratio  1.2 %  22  17:51    


 


Triglycerides  154 mg/dL (2-149)  H  22  17:51    


 


Cholesterol  131 mg/dL ()   22  17:51    


 


LDL Cholesterol Direct  59 mg/dL ()   22  17:51    


 


HDL Cholesterol  40 mg/dL (40-59)   22  17:51    


 


Cholesterol/HDL Ratio  3.27 %  22  17:51    


 


Nasal Screen MRSA (PCR)  Negative  (Negative)   22  Unknown














Active Medications





- Current Medications


Current Medications: 














Generic Name Dose Route Start Last Admin





  Trade Name Freq  PRN Reason Stop Dose Admin


 


Acetaminophen  650 mg  22 21:55 





  Acetaminophen 325 Mg Tab  PO  





  Q4H PRN  





  Pain MILD(1-3)/Fever >100.5/HA  


 


Hydrocodone Bitart/Acetaminophen  1 each  22 21:02 





  Hydrocodone/Acetaminophen 5-325 Mg Tab  PO  





  Q4H PRN  





  Pain, Moderate (4-6)  


 


Carvedilol  6.25 mg  22 22:00  22 09:27





  Carvedilol 6.25 Mg Tab  PO   6.25 mg





  BID ANTONY   Administration


 


Clonidine HCl  0.2 mg  22 10:00  22 09:27





  Clonidine 0.2 Mg Tab  PO   0.2 mg





  DAILY ANTONY   Administration


 


Clopidogrel Bisulfate  75 mg  22 10:00  22 09:27





  Clopidogrel 75 Mg Tab  PO   75 mg





  QDAY ANTONY   Administration


 


Heparin Sodium (Porcine)  2,400 unit  22 17:37 





  Heparin 10,000 Units/10 Ml Vial  40 unit/kg (2400 unit)  





  IV  





  Q6H PRN  





  Anti-Xa Assay < 0.1 units/ml  


 


Hydralazine HCl  50 mg  22 08:00  22 09:27





  Hydralazine 25 Mg Tab  PO   50 mg





  TID ANTONY   Administration


 


Heparin Sodium/Sodium Chloride  25,000 unit in 500 mls @ 16 mls/hr  22 

18:00  22 08:52





  Heparin/ 0.45% Nacl-25,000 Unit/500 Ml  IV   850 units/hr





  TITR ANTONY   17 mls/hr





    Titration





  Protocol  





  800 UNITS/HR  


 


Metoclopramide HCl  2.5 mg  22 22:05 





  Metoclopramide 10 Mg/2 Ml Inj  IV  





  Q6H PRN  





  Nausea And Vomiting  


 


Morphine Sulfate  2 mg  22 21:55 





  Morphine 2 Mg/1 Ml Inj  IV  





  Q4H PRN  





  Pain, Moderate (4-6)  


 


Morphine Sulfate  4 mg  22 21:55  22 06:27





  Morphine 4 Mg/1 Ml Inj  IV   4 mg





  Q4H PRN   Administration





  Pain , Severe (7-10)  


 


Nifedipine  60 mg  22 22:00  22 22:09





  Nifedipine Xl 60 Mg Tab  PO   Not Given





  HS ANTONY  


 


Ondansetron HCl  4 mg  22 21:55 





  Ondansetron 4 Mg/2 Ml Inj  IV  





  Q8H PRN  





  Nausea And Vomiting  


 


Oxycodone/Acetaminophen  1 tab  22 21:55  22 17:30





  Oxycodone /Acetaminophen 5-325mg Tab  PO   1 tab





  Q6H PRN   Administration





  Pain, Moderate (4-6)  


 


Peritoneal Dialysis Solution  2,000 ml  22 00:00  22 00:40





  Dialysate Pd2 1.5% Soln 2000 Ml  IP   2,000 ml





  Q6HR ANTONY   Administration


 


Sodium Chloride  10 ml  22 22:00  22 09:27





  Sodium Chloride 0.9% 10 Ml Flush Syringe  IV   10 ml





  BID ANTONY   Administration


 


Sodium Chloride  10 ml  22 21:55  22 06:29





  Sodium Chloride 0.9% 10 Ml Flush Syringe  IV   10 ml





  PRN PRN   Administration





  LINE FLUSH  














Nutrition/Malnutrition Assess





- Dietary Evaluation


Nutrition/Malnutrition Findings: 


                                        





Nutrition Notes                                            Start:  22 

10:07


Freq:                                                      Status: Active       




Protocol:                                                                       




 Document     22 10:07  LAINEY  (Rec: 22 10:23  LAINEY  [id number])


 Nutrition Notes


     Need for Assessment generated from:         MD Order,RN Referral,MST


     Initial or Follow up                        Assessment


     Current Diagnosis                           CKD (stage V CKD),Hypertension


                                                 ,Heart Failure


     Other Pertinent Diagnosis                   LLE pain s/p thrombectomy of (


                                                 L) leg


     Current Diet                                Renal


     Labs/Tests                                  Reviewed


     Pertinent Medications                       Heparin gtt


     Height                                      5 ft 7 in


     Weight                                      58.967 kg


     Ideal Body Weight (kg)                      61.36


     BMI                                         20.3


     Weight Status                               Underweight


     Subjective/Other Information                RD consulted for ONS; pt also


                                                 screened for malnutrition risk


                                                 .  Pt reported significant


                                                 difficulty in walking upon


                                                 admission.  Arterial duplex


                                                 scan revealed marked


                                                 peripheral artery disease of


                                                 LLE with absent flow noted


                                                 from distal femoral artery


                                                 through the lower ankle


                                                 arteries.  Pt having PD this


                                                 am.


     Burn                                        Absent


     Trauma                                      Absent


     Skin Integrity/Comment                      "Spider bite" wound to (L)


                                                 calf


     #1


      Nutrition Diagnosis                        Underweight


      Etiology                                   advanced age, medical dx, poor


                                                 oral intake


      As Evidenced by Signs and Symptoms         MD requests ONS; pt reports wt


                                                 loss upon admission


     Is patient on ventilator?                   No


     Is Patient Ambulatory and/or Out of Bed     No


     REE-([facility]-confined to bed)      1370.868


     Kcal/Kg value to use for calculation        30


     Approximate Energy Requirements Using       1769


      kcal/Kg                                    


     Calculation Used for Recommendations        Kcal/kg


     Additional Notes                            Pro needs 1.2-1.3g/k-77g/


                                                 day


                                                 Fluid needs 1-1.5L


 Nutrition Intervention


     Change Diet Order:                          Continue current diet order


     Add Supplement/Snack (indicate name/kcal    Nepro once daily


      /protein )                                 


     Provides kCal:                              425


     Provides Protein (gm)                       19


     Goal #1                                     PO intake of meals plus ONS to


                                                 meet at least 75% energy and


                                                 pro needs


     Goal #2                                     Wt maintenance and/or gain


     Anticipated Discharge Needs:                Renal diet


     Follow-Up By:                               22


     Additional Comments                         F/U: Malnutrition assessment,


                                                 intakes

## 2022-08-14 NOTE — ELECTROCARDIOGRAPH REPORT
[facility]

                                       

Test Date:    2022               Test Time:    18:13:55

Pat Name:     ANDREW CARSON         Department:   

Patient ID:   [id number]          Room:         A266 1

Gender:       F                        Technician:   TONY

:          1950               Requested By: CLEMENTE CALVIN

Order Number: [id number]             Reading MD:   Muriel Dinero

                                 Measurements

Intervals                              Axis          

Rate:         95                       P:            55

IL:           144                      QRS:          0

QRSD:         94                       T:            43

QT:           421                                    

QTc:          530                                    

                           Interpretive Statements

Sinus rhythm

Probable left atrial enlargement

Probable left ventricular hypertrophy

Prolonged QT interval

Compared to ECG 2021 10:09:27

Prolonged QT interval now present

Sinus tachycardia no longer present

Electronically Signed On 2022 14:38:36 EDT by Muriel Dinero

## 2022-08-14 NOTE — PROGRESS NOTE
Assessment and Plan





This is a 72 year old woman who presents with lower extremity pain





# ESRD: has been undergoing CAPD, now has home machine, will continue CCPD per 

home settings.  


- daily labs


- renally dose meds


- avoid nephrotoxins


- renal diet


- verbal consent obtained for HD





# Anemia: last hemoglobin 10.3->8.7; will restart ESAs once weekly   





# HTN: Adjust dextrose prn, mix 1.5% and 2.5%.  BP stable





# Secondary Hyperparathyroidism: continue home binders as needed, vitamin D 

analogs prn





# Acute occlusion of artery of lower extremity: appreciate vascular











Subjective


Date of service: 08/14/22


Principal diagnosis: Acute Left Lower Extremity Ischemia


Interval history: 





Resting in bed this afternoon, has home PD machine now.





Objective





- Exam


Narrative Exam: 





Constitutional: no acute distress


Head: NC/AT


Neck: supple


Lungs: clear to auscultation


CV: RRR, no M/R/G


Abdomen: soft, non-tender, bowel sounds present


Back: nontender


Extremities: no edema, pulses WNL


Skin: intact


Neuro: no focal deficits, alert and oriented x4





- Vital Signs


Vital signs: 


                               Vital Signs - 12hr











  08/14/22 08/14/22 08/14/22





  04:00 05:00 06:00


 


Temperature 98.7 F  


 


Pulse Rate 84 83 66


 


Pulse Rate [ 84  





From Monitor]   


 


Respiratory 18 18 29 H





Rate   


 


Blood Pressure 123/56 161/65 142/59


 


O2 Sat by Pulse 99 97 100





Oximetry   














  08/14/22 08/14/22 08/14/22





  07:36 09:27 12:53


 


Temperature 98.0 F  97.6 F


 


Pulse Rate  71 


 


Pulse Rate [   





From Monitor]   


 


Respiratory   





Rate   


 


Blood Pressure  191/76 


 


O2 Sat by Pulse   





Oximetry   














  08/14/22





  15:23


 


Temperature 


 


Pulse Rate 71


 


Pulse Rate [ 





From Monitor] 


 


Respiratory 





Rate 


 


Blood Pressure 147/57


 


O2 Sat by Pulse 





Oximetry 














- Lab





                                 08/13/22 08:45





                                 08/12/22 17:51


                             Most recent lab results











Calcium  8.8 mg/dL (8.4-10.2)   08/12/22  17:51    


 


Magnesium  1.60 mg/dL (1.7-2.3)  L  08/12/22  17:51    














Medications & Allergies





- Medications


Allergies/Adverse Reactions: 


                                    Allergies





digoxin Allergy (Verified 08/12/22 17:52)


   Hives


lisinopril Allergy (Verified 08/12/22 17:52)


   Swelling


nitroglycerin Adverse Reaction (Verified 08/12/22 17:52)


   Rash, HEADACHES


shellfish derived Adverse Reaction (Verified 08/12/22 17:52)


   Swelling








Home Medications: 


                                Home Medications











 Medication  Instructions  Recorded  Confirmed  Last Taken  Type


 


NIFEdipine XL [Procardia Xl] 60 mg PO HS 08/21/15 08/13/22 02/06/22 History


 


carvediloL [Coreg] 6.25 mg PO BID 08/21/15 08/13/22 02/06/22 History


 


cloNIDine [Catapres] 0.2 mg PO BID 12/09/16 08/13/22 02/06/22 History


 


Hydralazine HCl 50 mg PO TID 05/12/21 08/13/22 02/06/22 History


 


Doxycycline Monohydrate 100 mg PO BID #20 cap 02/07/22 08/13/22 Unknown Rx





[Doxycycline Monohydrate CAP]     











Active Medications: 














Generic Name Dose Route Start Last Admin





  Trade Name Freq  PRN Reason Stop Dose Admin


 


Acetaminophen  650 mg  08/12/22 21:55 





  Acetaminophen 325 Mg Tab  PO  





  Q4H PRN  





  Pain MILD(1-3)/Fever >100.5/HA  


 


Hydrocodone Bitart/Acetaminophen  1 each  08/12/22 21:02 





  Hydrocodone/Acetaminophen 5-325 Mg Tab  PO  





  Q4H PRN  





  Pain, Moderate (4-6)  


 


Apixaban  5 mg  08/14/22 15:00  08/14/22 15:23





  Apixaban 5 Mg Tab  PO   5 mg





  Q12HR ANTONY   Administration





  Protocol  


 


Carvedilol  6.25 mg  08/12/22 22:00  08/14/22 09:27





  Carvedilol 6.25 Mg Tab  PO   6.25 mg





  BID ANTONY   Administration


 


Clonidine HCl  0.2 mg  08/13/22 10:00  08/14/22 09:27





  Clonidine 0.2 Mg Tab  PO   0.2 mg





  DAILY ANTONY   Administration


 


Clopidogrel Bisulfate  75 mg  08/13/22 10:00  08/14/22 09:27





  Clopidogrel 75 Mg Tab  PO   75 mg





  QDAY ANTONY   Administration


 


Hydralazine HCl  50 mg  08/13/22 08:00  08/14/22 15:23





  Hydralazine 25 Mg Tab  PO   50 mg





  TID ANTONY   Administration


 


Metoclopramide HCl  2.5 mg  08/12/22 22:05 





  Metoclopramide 10 Mg/2 Ml Inj  IV  





  Q6H PRN  





  Nausea And Vomiting  


 


Morphine Sulfate  2 mg  08/12/22 21:55 





  Morphine 2 Mg/1 Ml Inj  IV  





  Q4H PRN  





  Pain, Moderate (4-6)  


 


Morphine Sulfate  4 mg  08/12/22 21:55  08/13/22 06:27





  Morphine 4 Mg/1 Ml Inj  IV   4 mg





  Q4H PRN   Administration





  Pain , Severe (7-10)  


 


Nifedipine  60 mg  08/12/22 22:00  08/13/22 22:09





  Nifedipine Xl 60 Mg Tab  PO   Not Given





  HS [facility]  


 


Ondansetron HCl  4 mg  08/12/22 21:55 





  Ondansetron 4 Mg/2 Ml Inj  IV  





  Q8H PRN  





  Nausea And Vomiting  


 


Oxycodone/Acetaminophen  1 tab  08/12/22 21:55  08/13/22 17:30





  Oxycodone /Acetaminophen 5-325mg Tab  PO   1 tab





  Q6H PRN   Administration





  Pain, Moderate (4-6)  


 


Peritoneal Dialysis Solution  2,000 ml  08/13/22 00:00  08/14/22 00:40





  Dialysate Pd2 1.5% Soln 2000 Ml  IP   2,000 ml





  Q6HR ANTONY   Administration


 


Sodium Chloride  10 ml  08/12/22 22:00  08/14/22 09:27





  Sodium Chloride 0.9% 10 Ml Flush Syringe  IV   10 ml





  BID ANTONY   Administration


 


Sodium Chloride  10 ml  08/12/22 21:55  08/13/22 06:29





  Sodium Chloride 0.9% 10 Ml Flush Syringe  IV   10 ml





  PRN PRN   Administration





  LINE FLUSH

## 2022-08-15 VITALS — DIASTOLIC BLOOD PRESSURE: 67 MMHG | SYSTOLIC BLOOD PRESSURE: 157 MMHG

## 2022-08-15 RX ADMIN — Medication SCH ML: at 09:24

## 2022-08-15 RX ADMIN — CLOPIDOGREL BISULFATE SCH MG: 75 TABLET ORAL at 09:24

## 2022-08-15 RX ADMIN — APIXABAN SCH MG: 5 TABLET, FILM COATED ORAL at 09:23

## 2022-08-15 NOTE — PROGRESS NOTE
Assessment and Plan


This is a 72 year old woman who presents with lower extremity pain





# ESRD: has been undergoing CAPD, now has home machine, will continue CCPD per 

home settings.  


- daily labs


- renally dose meds


- avoid nephrotoxins


- renal diet


- verbal consent obtained for HD





# Anemia: last hemoglobin 10.3->8.7; will restart ESAs once weekly   





# HTN: Adjust dextrose prn, mix 1.5% and 2.5%.  BP stable





# Secondary Hyperparathyroidism: continue home binders as needed, vitamin D 

analogs prn





# Acute occlusion of artery of lower extremity: appreciate vascular














Subjective


Principal diagnosis: Acute Left Lower Extremity Ischemia





Objective





- Exam


Narrative Exam: 


Constitutional: no acute distress


Head: NC/AT


Neck: supple


Lungs: clear to auscultation


CV: RRR, no M/R/G


Abdomen: soft, non-tender, bowel sounds present


Back: nontender


Extremities: no edema, pulses WNL


Skin: intact


Neuro: no focal deficits, alert and oriented x4








- Vital Signs


Vital signs: 


                               Vital Signs - 12hr











  08/14/22 08/14/22 08/15/22





  23:00 23:54 00:00


 


Temperature  99.1 F 


 


Pulse Rate 79  73


 


Respiratory 21  18





Rate   


 


Blood Pressure 182/89  162/91


 


O2 Sat by Pulse 94  90





Oximetry   














  08/15/22 08/15/22 08/15/22





  00:02 01:00 02:00


 


Temperature   


 


Pulse Rate 69 77 69


 


Respiratory 20 21 18





Rate   


 


Blood Pressure 162/91 162/91 146/58


 


O2 Sat by Pulse 88 94 93





Oximetry   














  08/15/22 08/15/22 08/15/22





  03:00 03:48 04:00


 


Temperature  98.1 F 


 


Pulse Rate 72  78


 


Respiratory 15  19





Rate   


 


Blood Pressure 165/63  169/76


 


O2 Sat by Pulse 92  85





Oximetry   














  08/15/22 08/15/22 08/15/22





  05:00 06:00 07:00


 


Temperature   


 


Pulse Rate 81 71 73


 


Respiratory 22 18 18





Rate   


 


Blood Pressure 165/42 158/67 156/64


 


O2 Sat by Pulse 99 99 98





Oximetry   














  08/15/22 08/15/22 08/15/22





  07:30 08:00 08:46


 


Temperature 98.3 F  


 


Pulse Rate  79 79


 


Respiratory  18 





Rate   


 


Blood Pressure  157/54 157/54


 


O2 Sat by Pulse  95 





Oximetry   














  08/15/22





  09:23


 


Temperature 


 


Pulse Rate 91 H


 


Respiratory 





Rate 


 


Blood Pressure 146/75


 


O2 Sat by Pulse 





Oximetry 














- Lab





                                 08/13/22 08:45





                                 08/12/22 17:51


                             Most recent lab results











Calcium  8.8 mg/dL (8.4-10.2)   08/12/22  17:51    


 


Magnesium  1.60 mg/dL (1.7-2.3)  L  08/12/22  17:51    














Medications & Allergies





- Medications


Allergies/Adverse Reactions: 


                                    Allergies





digoxin Allergy (Verified 08/12/22 17:52)


   Hives


lisinopril Allergy (Verified 08/12/22 17:52)


   Swelling


nitroglycerin Adverse Reaction (Verified 08/12/22 17:52)


   Rash, HEADACHES


shellfish derived Adverse Reaction (Verified 08/12/22 17:52)


   Swelling








Home Medications: 


                                Home Medications











 Medication  Instructions  Recorded  Confirmed  Last Taken  Type


 


NIFEdipine XL [Procardia Xl] 60 mg PO HS 08/21/15 08/13/22 02/06/22 History


 


carvediloL [Coreg] 6.25 mg PO BID 08/21/15 08/13/22 02/06/22 History


 


cloNIDine [Catapres] 0.2 mg PO BID 12/09/16 08/13/22 02/06/22 History


 


Hydralazine HCl 50 mg PO TID 05/12/21 08/13/22 02/06/22 History


 


Doxycycline Monohydrate 100 mg PO BID #20 cap 02/07/22 08/13/22 Unknown Rx





[Doxycycline Monohydrate CAP]     











Active Medications: 














Generic Name Dose Route Start Last Admin





  Trade Name Freq  PRN Reason Stop Dose Admin


 


Acetaminophen  650 mg  08/12/22 21:55 





  Acetaminophen 325 Mg Tab  PO  





  Q4H PRN  





  Pain MILD(1-3)/Fever >100.5/HA  


 


Hydrocodone Bitart/Acetaminophen  1 each  08/12/22 21:02  08/14/22 21:34





  Hydrocodone/Acetaminophen 5-325 Mg Tab  PO   1 each





  Q4H PRN   Administration





  Pain, Moderate (4-6)  


 


Apixaban  5 mg  08/14/22 15:00  08/15/22 09:23





  Apixaban 5 Mg Tab  PO   5 mg





  Q12HR ANTONY   Administration





  Protocol  


 


Carvedilol  6.25 mg  08/12/22 22:00  08/15/22 09:23





  Carvedilol 6.25 Mg Tab  PO   6.25 mg





  BID ANTONY   Administration


 


Clonidine HCl  0.2 mg  08/13/22 10:00  08/15/22 09:23





  Clonidine 0.2 Mg Tab  PO   0.2 mg





  DAILY ANTONY   Administration


 


Clopidogrel Bisulfate  75 mg  08/13/22 10:00  08/15/22 09:24





  Clopidogrel 75 Mg Tab  PO   75 mg





  QDAY ANTONY   Administration


 


Hydralazine HCl  50 mg  08/13/22 08:00  08/15/22 08:46





  Hydralazine 25 Mg Tab  PO   50 mg





  TID ANTONY   Administration


 


Metoclopramide HCl  2.5 mg  08/12/22 22:05 





  Metoclopramide 10 Mg/2 Ml Inj  IV  





  Q6H PRN  





  Nausea And Vomiting  


 


Morphine Sulfate  2 mg  08/12/22 21:55 





  Morphine 2 Mg/1 Ml Inj  IV  





  Q4H PRN  





  Pain, Moderate (4-6)  


 


Morphine Sulfate  4 mg  08/12/22 21:55  08/13/22 06:27





  Morphine 4 Mg/1 Ml Inj  IV   4 mg





  Q4H PRN   Administration





  Pain , Severe (7-10)  


 


Nifedipine  60 mg  08/12/22 22:00  08/14/22 21:08





  Nifedipine Xl 60 Mg Tab  PO   60 mg





  HS ANTONY   Administration


 


Ondansetron HCl  4 mg  08/12/22 21:55 





  Ondansetron 4 Mg/2 Ml Inj  IV  





  Q8H PRN  





  Nausea And Vomiting  


 


Oxycodone/Acetaminophen  1 tab  08/12/22 21:55  08/13/22 17:30





  Oxycodone /Acetaminophen 5-325mg Tab  PO   1 tab





  Q6H PRN   Administration





  Pain, Moderate (4-6)  


 


Peritoneal Dialysis Solution  6,000 ml  08/14/22 22:00 





  Dialysate Lo Aneesh 1.5% Soln 2000 Ml  IP  





  QHS ANTONY  


 


Peritoneal Dialysis Solution  6,000 ml  08/14/22 22:00 





  Dialysate Lo Aneesh 2.5% Soln 2000 Ml  IP  





  QHS ANTONY  


 


Sodium Chloride  10 ml  08/12/22 22:00  08/15/22 09:24





  Sodium Chloride 0.9% 10 Ml Flush Syringe  IV   10 ml





  BID ANTONY   Administration


 


Sodium Chloride  10 ml  08/12/22 21:55  08/13/22 06:29





  Sodium Chloride 0.9% 10 Ml Flush Syringe  IV   10 ml





  PRN PRN   Administration





  LINE FLUSH

## 2022-08-15 NOTE — PROGRESS NOTE
Assessment and Plan


Assessment and plan: 





History of present illness: 


72-year-old female with history of end-stage renal disease on peritoneal 

dialysis, hypertension presents with left lower extremity pain numbness and 

significant difficulty in walking.  Her symptoms are confined to the left mid 

thigh and call and left foot.  Patient says that her symptoms started around 9 

AM.


Emergent  arterial duplex scan revealed---Marked peripheral artery disease of 

the left extremity with absent flow noted from the distal superficial femoral 

artery through the lower ankle arthritis.  Vascular surgery Was Informed and the

Patient Was Taken to the Cath Lab for immediate intervention.





Hospital Course:


: No acute complaints this AM. States that LLE symptomology significantly 

improved, no pain/numbness/paresthesias reported today. good dp and pt pulses 

appreciated.





: Vascular recommends ECHO and CTA chest to r/o intracardiac thrombus. CTA 

chest negative for thrombus. ECHO pending. Continue heparin gtt.





8/15: Vascular d/c heparin, start eliquis. Awaiting PT assessment. Transfer to 

[facility] floor bed. 





Assessment and Plan:


(1) Acute occlusion of artery of lower extremity


Current Visit: Yes   Status: Acute   


Plan to address problem: 


Patient taken to the Cath Lab for intervention and possible stent placement


Patient was started on IV heparin


Admitted to [facility]








(2) Acute pain of left lower extremity


Current Visit: Yes   Status: Acute   


Plan to address problem: 


Emergent vascular surgery intervention








(3) End stage renal disease


Current Visit: Yes   Status: Chronic   


Plan to address problem: 


Nephrology consulted


Dialysis as per schedule








(4) Hypertension


Current Visit: Yes   Status: Chronic   


Qualifiers: 


   Hypertension type: primary hypertension 


Plan to address problem: 


Continue antihypertensives








(5) Dilated cardiomyopathy


Current Visit: Yes   Status: Chronic   


Plan to address problem: 


Increase ultrafiltration to prevent volume overload








(6) DVT prophylaxis


Current Visit: Yes   Status: Acute   


Plan to address problem: 


Patient on IV heparin and GI prophylaxis








(7) Advance care planning


Current Visit: Yes   Status: Acute   


Plan to address problem: 


Disease education conducted, care plan discussed, below diagnosis discussed and 

prognosis discussed.  Patient acknowledged understanding with care plan.  +30 

minutes.





Critical care statement


The high probability OF a clinically significant sudden or life-threatening 

deterioration of the cardiorespiratory system and endocrine system required my 

full and direct attention, intervention and postoperative management.  The 

aggregate critical care time was 40 minutes.  The time is in addition to time 

spent performing reported procedures but includes the followin: Data review and interpretation


2: Patient assessment and monitoring of vital signs


3: Documentation


4:: Medication orders and management


Advance Directives: Yes





Hospitalist Physical





- Physical exam


Narrative exam: 





General appearance: Present: no acute distress, well-nourished





- EENT


Eyes: Present: PERRL


ENT: hearing intact, clear oral mucosa





- Neck


Neck: Present: supple, normal ROM





- Respiratory


Respiratory effort: normal


Respiratory: bilateral: CTA





- Cardiovascular


Heart rate: 78


Rhythm: regular


Heart Sounds: Present: S1 & S2.  Absent: rub, click





- Extremities


Extremities: No edema, abnormal (Left lower extremity cold to touch, but no 

gangrenous changes)--> improved now warm to touch


Extremity abnormal: pulses palpable in DP and PT other (Same as overall)


Peripheral Pulses: within normal limits





- Abdominal


General gastrointestinal: Present: soft, non-tender, non-distended, normal bowel

sounds


Female genitourinary: Present: normal





- Integumentary


Integumentary: Present: clear, warm, dry





- Musculoskeletal


Musculoskeletal: gait normal, strength equal bilaterally





- Psychiatric


Psychiatric: appropriate mood/affect, intact judgment & insight





- Neurologic


Neurologic: CNII-XII intact, moves all extremities





- Constitutional


Vitals: 


                                        











Temp Pulse Resp BP Pulse Ox


 


 98.3 F   79   18   157/54   95 


 


 08/15/22 07:30  08/15/22 08:00  08/15/22 08:00  08/15/22 08:00  08/15/22 08:00











General appearance: Present: no acute distress





HEART Score





- HEART Score


Age: > 65


Risk factors: > 3 risk factors or hx of atherosclerotic disease


Troponin: 


                                        











Troponin T  0.203 ng/mL (0.00-0.029)  H*  22  17:51    











Troponin: < normal limit





- Critical Actions


Critical Actions: 4-6 pts:12-16.6% risk of adverse cardiac event. Should be 

admitted





Results





- Labs


CBC & Chem 7: 


                                 22 08:45





                                 22 17:51


Labs: 


                             Laboratory Last Values











WBC  7.7 K/mm3 (4.5-11.0)   22  08:45    


 


RBC  2.87 M/mm3 (3.65-5.03)  L  22  08:45    


 


Hgb  8.7 gm/dl (10.1-14.3)  L  22  08:45    


 


Hct  28.1 % (30.3-42.9)  L  22  08:45    


 


MCV  98 fl (79-97)  H  22  08:45    


 


MCH  30 pg (28-32)   22  08:45    


 


MCHC  31 % (30-34)   22  08:45    


 


RDW  23.8 % (13.2-15.2)  H  22  08:45    


 


Plt Count  226 K/mm3 (140-440)   22  08:45    


 


Lymph % (Auto)  14.9 % (13.4-35.0)   22  08:45    


 


Mono % (Auto)  9.6 % (0.0-7.3)  H  22  08:45    


 


Eos % (Auto)  6.9 % (0.0-4.3)  H  22  08:45    


 


Baso % (Auto)  1.2 % (0.0-1.8)   22  08:45    


 


Lymph # (Auto)  1.1 K/mm3 (1.2-5.4)  L  22  08:45    


 


Mono # (Auto)  0.7 K/mm3 (0.0-0.8)   22  08:45    


 


Eos # (Auto)  0.5 K/mm3 (0.0-0.4)  H  22  08:45    


 


Baso # (Auto)  0.1 K/mm3 (0.0-0.1)   22  08:45    


 


Seg Neutrophils %  67.4 % (40.0-70.0)   22  08:45    


 


Seg Neutrophils #  5.2 K/mm3 (1.8-7.7)   22  08:45    


 


PT  13.1 Sec. (12.2-14.9)   22  17:51    


 


INR  0.90  (0.87-1.13)   22  17:51    


 


APTT  29.2 Sec. (24.2-36.6)   22  17:51    


 


Heparin Anti-Xa Level  0.28 U.I./ml (0.3-0.7)  L  22  07:28    


 


Sodium  141 mmol/L (137-145)   22  17:51    


 


Potassium  3.7 mmol/L (3.6-5.0)   22  17:51    


 


Chloride  96.3 mmol/L ()  L  22  17:51    


 


Carbon Dioxide  25 mmol/L (22-30)   22  17:51    


 


Anion Gap  23 mmol/L  22  17:51    


 


BUN  48 mg/dL (7-17)  H  22  17:51    


 


Creatinine  10.1 mg/dL (0.6-1.2)  H  22  17:51    


 


Estimated GFR  5 ml/min  22  17:51    


 


BUN/Creatinine Ratio  5 %  22  17:51    


 


Glucose  91 mg/dL ()   22  17:51    


 


Lactic Acid  0.80 mmol/L (0.7-2.0)   22  17:52    


 


Calcium  8.8 mg/dL (8.4-10.2)   22  17:51    


 


Magnesium  1.60 mg/dL (1.7-2.3)  L  22  17:51    


 


Total Bilirubin  0.40 mg/dL (0.1-1.2)   22  17:51    


 


AST  14 units/L (5-40)   22  17:51    


 


ALT  13 units/L (7-56)   22  17:51    


 


Alkaline Phosphatase  176 units/L ()  H  22  17:51    


 


Total Creatine Kinase  99 units/L ()   22  17:51    


 


Troponin T  0.203 ng/mL (0.00-0.029)  H*  22  17:51    


 


Total Protein  6.2 g/dL (6.3-8.2)  L  22  17:51    


 


Albumin  3.4 g/dL (3.9-5)  L  22  17:51    


 


Albumin/Globulin Ratio  1.2 %  22  17:51    


 


Triglycerides  154 mg/dL (2-149)  H  22  17:51    


 


Cholesterol  131 mg/dL ()   22  17:51    


 


LDL Cholesterol Direct  59 mg/dL ()   22  17:51    


 


HDL Cholesterol  40 mg/dL (40-59)   22  17:51    


 


Cholesterol/HDL Ratio  3.27 %  22  17:51    


 


Nasal Screen MRSA (PCR)  Negative  (Negative)   22  Unknown














Active Medications





- Current Medications


Current Medications: 














Generic Name Dose Route Start Last Admin





  Trade Name Freq  PRN Reason Stop Dose Admin


 


Acetaminophen  650 mg  22 21:55 





  Acetaminophen 325 Mg Tab  PO  





  Q4H PRN  





  Pain MILD(1-3)/Fever >100.5/HA  


 


Hydrocodone Bitart/Acetaminophen  1 each  22 21:02  22 21:34





  Hydrocodone/Acetaminophen 5-325 Mg Tab  PO   1 each





  Q4H PRN   Administration





  Pain, Moderate (4-6)  


 


Apixaban  5 mg  22 15:00  22 21:08





  Apixaban 5 Mg Tab  PO   Not Given





  Q12HR ANTONY  





  Protocol  


 


Carvedilol  6.25 mg  22 22:00  22 21:08





  Carvedilol 6.25 Mg Tab  PO   6.25 mg





  BID ANTONY   Administration


 


Clonidine HCl  0.2 mg  22 10:00  22 09:27





  Clonidine 0.2 Mg Tab  PO   0.2 mg





  DAILY ANTONY   Administration


 


Clopidogrel Bisulfate  75 mg  22 10:00  22 09:27





  Clopidogrel 75 Mg Tab  PO   75 mg





  QDAY ANTONY   Administration


 


Hydralazine HCl  50 mg  22 08:00  22 21:08





  Hydralazine 25 Mg Tab  PO   50 mg





  TID ANTONY   Administration


 


Metoclopramide HCl  2.5 mg  22 22:05 





  Metoclopramide 10 Mg/2 Ml Inj  IV  





  Q6H PRN  





  Nausea And Vomiting  


 


Morphine Sulfate  2 mg  22 21:55 





  Morphine 2 Mg/1 Ml Inj  IV  





  Q4H PRN  





  Pain, Moderate (4-6)  


 


Morphine Sulfate  4 mg  22 21:55  22 06:27





  Morphine 4 Mg/1 Ml Inj  IV   4 mg





  Q4H PRN   Administration





  Pain , Severe (7-10)  


 


Nifedipine  60 mg  22 22:00  22 21:08





  Nifedipine Xl 60 Mg Tab  PO   60 mg





  HS ANTONY   Administration


 


Ondansetron HCl  4 mg  22 21:55 





  Ondansetron 4 Mg/2 Ml Inj  IV  





  Q8H PRN  





  Nausea And Vomiting  


 


Oxycodone/Acetaminophen  1 tab  22 21:55  22 17:30





  Oxycodone /Acetaminophen 5-325mg Tab  PO   1 tab





  Q6H PRN   Administration





  Pain, Moderate (4-6)  


 


Peritoneal Dialysis Solution  6,000 ml  22 22:00 





  Dialysate Lo Aneesh 1.5% Soln 2000 Ml  IP  





  QHS ANTONY  


 


Peritoneal Dialysis Solution  6,000 ml  22 22:00 





  Dialysate Lo Aneesh 2.5% Soln 2000 Ml  IP  





  QHS ANTONY  


 


Sodium Chloride  10 ml  22 22:00  22 21:09





  Sodium Chloride 0.9% 10 Ml Flush Syringe  IV   10 ml





  BID ANTONY   Administration


 


Sodium Chloride  10 ml  22 21:55  22 06:29





  Sodium Chloride 0.9% 10 Ml Flush Syringe  IV   10 ml





  PRN PRN   Administration





  LINE FLUSH  














Nutrition/Malnutrition Assess





- Dietary Evaluation


Nutrition/Malnutrition Findings: 


                                        





Nutrition Notes                                            Start:  22 

10:07


Freq:                                                      Status: Active       




Protocol:                                                                       




 Document     22 10:07  [facility]  (Rec: 22 10:23  [facility]  [id number])


 Nutrition Notes


     Need for Assessment generated from:         MD Order,RN Referral,MST


     Initial or Follow up                        Assessment


     Current Diagnosis                           CKD (stage V CKD),Hypertension


                                                 ,Heart Failure


     Other Pertinent Diagnosis                   LLE pain s/p thrombectomy of (


                                                 L) leg


     Current Diet                                Renal


     Labs/Tests                                  Reviewed


     Pertinent Medications                       Heparin gtt


     Height                                      5 ft 7 in


     Weight                                      58.967 kg


     Ideal Body Weight (kg)                      61.36


     BMI                                         20.3


     Weight Status                               Underweight


     Subjective/Other Information                RD consulted for ONS; pt also


                                                 screened for malnutrition risk


                                                 .  Pt reported significant


                                                 difficulty in walking upon


                                                 admission.  Arterial duplex


                                                 scan revealed marked


                                                 peripheral artery disease of


                                                 LLE with absent flow noted


                                                 from distal femoral artery


                                                 through the lower ankle


                                                 arteries.  Pt having PD this


                                                 am.


     Burn                                        Absent


     Trauma                                      Absent


     Skin Integrity/Comment                      "Spider bite" wound to (L)


                                                 calf


     Minimum of two criteria                     No


     #1


      Nutrition Diagnosis                        Underweight


      Etiology                                   advanced age, medical dx, poor


                                                 oral intake


      As Evidenced by Signs and Symptoms         MD requests ONS; pt reports wt


                                                 loss upon admission


     Is patient on ventilator?                   No


     Is Patient Ambulatory and/or Out of Bed     No


     REE-([place]-St[place] Jeor-confined to bed)      1370.868


     Kcal/Kg value to use for calculation        30


     Approximate Energy Requirements Using       1769


      kcal/Kg                                    


     Calculation Used for Recommendations        Kcal/kg


     Additional Notes                            Pro needs 1.2-1.3g/k-77g/


                                                 day


                                                 Fluid needs 1-1.5L


 Nutrition Intervention


     Change Diet Order:                          Continue current diet order


     Add Supplement/Snack (indicate name/kcal    Nepro once daily


      /protein )                                 


     Provides kCal:                              425


     Provides Protein (gm)                       19


     Goal #1                                     PO intake of meals plus ONS to


                                                 meet at least 75% energy and


                                                 pro needs


     Goal #2                                     Wt maintenance and/or gain


     Anticipated Discharge Needs:                Renal diet


     Follow-Up By:                               22


     Additional Comments                         F/U: intakes (meals/ONS), wt


                                                 assessment

## 2022-08-15 NOTE — PROGRESS NOTE
Assessment and Plan





Physical therapy consulted.  Patient lives at home with her .  She can be

transferred from the [facility] to the floor.  Patient was transitioned to Eliquis 

which she will continue on on discharge.  Patient will follow-up with  2

weeks following discharge.





Subjective


Date of service: 08/15/22


Principal diagnosis: Acute Left Lower Extremity Ischemia


Interval history: 





Patient with acute on chronic thromboembolic disease involving bilateral lower 

extremities left greater than right with superimposed PVD who is status post 

embolectomy on 8/12.  Patient is done very well since her procedure.  Regained 

motor and sensation.  Patient complains only of minimal amount of "numbness" 

that is improving daily.





Objective





- Constitutional


Vitals: 


                               Vital Signs - 12hr











  08/14/22 08/14/22 08/14/22





  21:00 22:00 23:00


 


Temperature   


 


Pulse Rate 71 72 79


 


Respiratory 22 21 21





Rate   


 


Blood Pressure 158/65 144/75 182/89


 


O2 Sat by Pulse 98 97 94





Oximetry   














  08/14/22 08/15/22 08/15/22





  23:54 00:00 00:02


 


Temperature 99.1 F  


 


Pulse Rate  73 69


 


Respiratory  18 20





Rate   


 


Blood Pressure  162/91 162/91


 


O2 Sat by Pulse  90 88





Oximetry   














  08/15/22 08/15/22 08/15/22





  01:00 02:00 03:00


 


Temperature   


 


Pulse Rate 77 69 72


 


Respiratory 21 18 15





Rate   


 


Blood Pressure 162/91 146/58 165/63


 


O2 Sat by Pulse 94 93 92





Oximetry   














  08/15/22 08/15/22 08/15/22





  03:48 04:00 05:00


 


Temperature 98.1 F  


 


Pulse Rate  78 81


 


Respiratory  19 22





Rate   


 


Blood Pressure  169/76 165/42


 


O2 Sat by Pulse  85 99





Oximetry   














  08/15/22 08/15/22 08/15/22





  06:00 07:00 07:30


 


Temperature   98.3 F


 


Pulse Rate 71 73 


 


Respiratory 18 18 





Rate   


 


Blood Pressure 158/67 156/64 


 


O2 Sat by Pulse 99 98 





Oximetry   














  08/15/22





  08:00


 


Temperature 


 


Pulse Rate 79


 


Respiratory 18





Rate 


 


Blood Pressure 157/54


 


O2 Sat by Pulse 95





Oximetry 











General appearance: Present: no acute distress





- EENT


Eyes: EOM intact


ENT: hearing intact





- Neck


Neck: supple, normal ROM





- Respiratory


Respiratory effort: normal





- Breasts


Breasts: deferred


Extremities: abnormal (Left foot warm and well-perfused, nontender)





- Gastrointestinal


General gastrointestinal: Present: deferred


Rectal Exam: deferred





- Genitourinary


Female genitourinary: deferred





- Psychiatric


Psychiatric: appropriate mood/affect, cooperative





- Labs


CBC & Chem 7: 


                                 08/13/22 08:45





                                 08/12/22 17:51


Labs: 


                              Abnormal lab results











  08/14/22 Range/Units





  07:28 


 


Heparin Anti-Xa Level  0.28 L  (0.3-0.7)  U.I./ml














Medications & Allergies





- Medications


Allergies/Adverse Reactions: 


                                    Allergies





digoxin Allergy (Verified 08/12/22 17:52)


   Hives


lisinopril Allergy (Verified 08/12/22 17:52)


   Swelling


nitroglycerin Adverse Reaction (Verified 08/12/22 17:52)


   Rash, HEADACHES


shellfish derived Adverse Reaction (Verified 08/12/22 17:52)


   Swelling








Home Medications: 


                                Home Medications











 Medication  Instructions  Recorded  Confirmed  Last Taken  Type


 


NIFEdipine XL [Procardia Xl] 60 mg PO HS 08/21/15 08/13/22 02/06/22 History


 


carvediloL [Coreg] 6.25 mg PO BID 08/21/15 08/13/22 02/06/22 History


 


cloNIDine [Catapres] 0.2 mg PO BID 12/09/16 08/13/22 02/06/22 History


 


Hydralazine HCl 50 mg PO TID 05/12/21 08/13/22 02/06/22 History


 


Doxycycline Monohydrate 100 mg PO BID #20 cap 02/07/22 08/13/22 Unknown Rx





[Doxycycline Monohydrate CAP]     











Active Medications: 














Generic Name Dose Route Start Last Admin





  Trade Name Jrq  PRN Reason Stop Dose Admin


 


Acetaminophen  650 mg  08/12/22 21:55 





  Acetaminophen 325 Mg Tab  PO  





  Q4H PRN  





  Pain MILD(1-3)/Fever >100.5/HA  


 


Hydrocodone Bitart/Acetaminophen  1 each  08/12/22 21:02  08/14/22 21:34





  Hydrocodone/Acetaminophen 5-325 Mg Tab  PO   1 each





  Q4H PRN   Administration





  Pain, Moderate (4-6)  


 


Apixaban  5 mg  08/14/22 15:00  08/14/22 21:08





  Apixaban 5 Mg Tab  PO   Not Given





  Q12HR ANTONY  





  Protocol  


 


Carvedilol  6.25 mg  08/12/22 22:00  08/14/22 21:08





  Carvedilol 6.25 Mg Tab  PO   6.25 mg





  BID ANTONY   Administration


 


Clonidine HCl  0.2 mg  08/13/22 10:00  08/14/22 09:27





  Clonidine 0.2 Mg Tab  PO   0.2 mg





  DAILY ANTONY   Administration


 


Clopidogrel Bisulfate  75 mg  08/13/22 10:00  08/14/22 09:27





  Clopidogrel 75 Mg Tab  PO   75 mg





  QDAY ANTONY   Administration


 


Hydralazine HCl  50 mg  08/13/22 08:00  08/14/22 21:08





  Hydralazine 25 Mg Tab  PO   50 mg





  TID ANTONY   Administration


 


Metoclopramide HCl  2.5 mg  08/12/22 22:05 





  Metoclopramide 10 Mg/2 Ml Inj  IV  





  Q6H PRN  





  Nausea And Vomiting  


 


Morphine Sulfate  2 mg  08/12/22 21:55 





  Morphine 2 Mg/1 Ml Inj  IV  





  Q4H PRN  





  Pain, Moderate (4-6)  


 


Morphine Sulfate  4 mg  08/12/22 21:55  08/13/22 06:27





  Morphine 4 Mg/1 Ml Inj  IV   4 mg





  Q4H PRN   Administration





  Pain , Severe (7-10)  


 


Nifedipine  60 mg  08/12/22 22:00  08/14/22 21:08





  Nifedipine Xl 60 Mg Tab  PO   60 mg





  HS ANTONY   Administration


 


Ondansetron HCl  4 mg  08/12/22 21:55 





  Ondansetron 4 Mg/2 Ml Inj  IV  





  Q8H PRN  





  Nausea And Vomiting  


 


Oxycodone/Acetaminophen  1 tab  08/12/22 21:55  08/13/22 17:30





  Oxycodone /Acetaminophen 5-325mg Tab  PO   1 tab





  Q6H PRN   Administration





  Pain, Moderate (4-6)  


 


Peritoneal Dialysis Solution  6,000 ml  08/14/22 22:00 





  Dialysate Lo Aneesh 1.5% Soln 2000 Ml  IP  





  [facility] ANTONY  


 


Peritoneal Dialysis Solution  6,000 ml  08/14/22 22:00 





  Dialysate Lo Aneesh 2.5% Soln 2000 Ml  IP  





  [facility] ANTONY  


 


Sodium Chloride  10 ml  08/12/22 22:00  08/14/22 21:09





  Sodium Chloride 0.9% 10 Ml Flush Syringe  IV   10 ml





  BID ANTONY   Administration


 


Sodium Chloride  10 ml  08/12/22 21:55  08/13/22 06:29





  Sodium Chloride 0.9% 10 Ml Flush Syringe  IV   10 ml





  PRN PRN   Administration





  LINE FLUSH  














HEART Score





- HEART Score


Age: > 65


Risk factors: > 3 risk factors or hx of atherosclerotic disease


Troponin: 


                                        











Troponin T  0.203 ng/mL (0.00-0.029)  H*  08/12/22  17:51    











Troponin: < normal limit





- Critical Actions


Critical Actions: 4-6 pts:12-16.6% risk of adverse cardiac event. Should be 

admitted

## 2022-08-15 NOTE — DISCHARGE SUMMARY
Providers





- Providers


Date of Admission: 


08/12/22 19:02





Date of discharge: 08/15/22


Attending physician: 


JULIO SANCHEZ MD





                                        





08/12/22 16:06


Consult to Physician [CONS] Urgent 


   Comment: 


   Consulting Provider: PARDEEP GUTIERREZ


   Physician Instructions: 


   Reason For Exam: End-stage renal disease





08/12/22 17:27


Consult to Physician [CONS] Urgent 


   Comment: 


   Consulting Provider: SASHA GUTIÉRREZ


   Physician Instructions: 


   Reason For Exam: Acute arterial occlusion





08/12/22 23:27


Consult to Dietitian/Nutrition [CONS] Routine 


   Physician Instructions: 


   Reason For Exam: 


   Reason for Consult: Pt needs oral supplement





08/15/22 08:18


Physical Therapy Evaluation and Treat [CONS] Routine 


   Comment: 


   Reason For Exam: Left leg arterial occlusion/revasc











Primary care physician: 


PRIMARY CARE MD








Hospitalization


Reason for admission: left leg pain and numbness


Condition: Serious


Hospital course: 





History of present illness: 


72-year-old female with history of end-stage renal disease on peritoneal 

dialysis, hypertension presents with left lower extremity pain numbness and 

significant difficulty in walking.  Her symptoms are confined to the left mid 

thigh and call and left foot.  Patient says that her symptoms started around 9 

AM.


Emergent  arterial duplex scan revealed---Marked peripheral artery disease of 

the left extremity with absent flow noted from the distal superficial femoral 

artery through the lower ankle arthritis.  Vascular surgery Was Informed and the

 Patient Was Taken to the Cath Lab for immediate intervention.





Hospital Course:


8/13: No acute complaints this AM. States that LLE symptomology significantly 

improved, no pain/numbness/paresthesias reported today. good dp and pt pulses 

appreciated.





8/14: Vascular recommends ECHO and CTA chest to r/o intracardiac thrombus. CTA 

chest negative for thrombus. ECHO pending. Continue heparin gtt.





8/15: Vascular d/c heparin, start eliquis. PT assessment states that patient can

 be d/c home with home health care Pt and rolling walker. clonidine, coreg, 

apixaban, hydralazine, plavix, nifedipine e-rx to pharmacy.





Assessment and Plan:


(1) Acute occlusion of artery of lower extremity


Current Visit: Yes   Status: Acute   


Plan to address problem: 


Patient taken to the Cath Lab for intervention and possible stent placement


Patient was started on IV heparin


Admitted to [facility]








(2) Acute pain of left lower extremity


Current Visit: Yes   Status: Acute   


Plan to address problem: 


Emergent vascular surgery intervention








(3) End stage renal disease


Current Visit: Yes   Status: Chronic   


Plan to address problem: 


Nephrology consulted


Dialysis as per schedule








(4) Hypertension


Current Visit: Yes   Status: Chronic   


Qualifiers: 


   Hypertension type: primary hypertension 


Plan to address problem: 


Continue antihypertensives








(5) Dilated cardiomyopathy


Current Visit: Yes   Status: Chronic   


Plan to address problem: 


Increase ultrafiltration to prevent volume overload








(6) DVT prophylaxis


Current Visit: Yes   Status: Acute   


Plan to address problem: 


Patient on IV heparin and GI prophylaxis








(7) Advance care planning


Current Visit: Yes   Status: Acute   


Plan to address problem: 


Disease education conducted, care plan discussed, below diagnosis discussed and 

prognosis discussed.  Patient acknowledged understanding with care plan.  +30 

minutes.


Disposition: 06 HOME HEALTH CARE SERVICE


Final Discharge Diagnosis (Prints w/discharge instructions): Acute occlusion of 

artery of left lower extremity, ESRD on HD, HTN, Dilated CMP


Time spent for discharge: 35





Core Measure Documentation





- Palliative Care


Palliative Care/ Comfort Measures: Not Applicable





- Core Measures


Any of the following diagnoses?: none





Exam





- Physical Exam


Narrative exam: 





General appearance: Present: no acute distress, well-nourished





- EENT


Eyes: Present: PERRL


ENT: hearing intact, clear oral mucosa





- Neck


Neck: Present: supple, normal ROM





- Respiratory


Respiratory effort: normal


Respiratory: bilateral: CTA





- Cardiovascular


Heart rate: 78


Rhythm: regular


Heart Sounds: Present: S1 & S2.  Absent: rub, click





- Extremities


Extremities: No edema, abnormal (Left lower extremity cold to touch, but no 

gangrenous changes)--> improved now warm to touch


Extremity abnormal: pulses palpable in DP and PT other (Same as overall)


Peripheral Pulses: within normal limits





- Abdominal


General gastrointestinal: Present: soft, non-tender, non-distended, normal bowel

 sounds


Female genitourinary: Present: normal





- Integumentary


Integumentary: Present: clear, warm, dry





- Musculoskeletal


Musculoskeletal: gait normal, strength equal bilaterally





- Psychiatric


Psychiatric: appropriate mood/affect, intact judgment & insight





- Neurologic


Neurologic: CNII-XII intact, moves all extremities





- Constitutional


Vitals: 


                                        











Temp Pulse Resp BP Pulse Ox


 


 97.7 F   91 H  22   137/55   95 


 


 08/15/22 11:48  08/15/22 09:23  08/15/22 09:00  08/15/22 11:02  08/15/22 11:02














Plan


Follow up with: 


PRIMARY CARE,MD [Primary Care Provider] - 3-5 Days


Prescriptions: 


cloNIDine [Catapres] 0.2 mg PO BID 30 Days #60 tab


carvediloL [Coreg] 6.25 mg PO BID 30 Days #60 tab


Apixaban [Eliquis] 5 mg PO Q12HR 30 Days #60 tablet


Hydralazine HCl 50 mg PO TID 30 Days #90 tab


Clopidogrel [Plavix] 75 mg PO QDAY 30 Days #60 tablet


NIFEdipine XL [Procardia Xl] 60 mg PO HS 90 Days #90 tablet